# Patient Record
Sex: FEMALE | Race: BLACK OR AFRICAN AMERICAN | NOT HISPANIC OR LATINO | Employment: FULL TIME | URBAN - METROPOLITAN AREA
[De-identification: names, ages, dates, MRNs, and addresses within clinical notes are randomized per-mention and may not be internally consistent; named-entity substitution may affect disease eponyms.]

---

## 2017-01-27 RX ORDER — POTASSIUM CHLORIDE 750 MG/1
TABLET, EXTENDED RELEASE ORAL
Qty: 30 TABLET | Refills: 0 | OUTPATIENT
Start: 2017-01-27

## 2017-03-18 ENCOUNTER — NURSE TRIAGE (OUTPATIENT)
Dept: ADMINISTRATIVE | Facility: CLINIC | Age: 27
End: 2017-03-18

## 2017-03-18 NOTE — TELEPHONE ENCOUNTER
Reason for Disposition   Caller requesting a NON-URGENT new prescription or refill and triager unable to refill per unit policy    Protocols used: ST MEDICATION QUESTION CALL-A-    Pt calling with concerns of medication management.  Pt states wanting to know if she could or how should she take medications due to the amount of medications she has.  Pt also states Potassium pill is noted in stool off and on; not sure if that should be happening.  Pt also needs a refill on Potassium medication (pt does have enough to get through the weekend).

## 2017-07-29 RX ORDER — LISINOPRIL 20 MG/1
TABLET ORAL
Qty: 30 TABLET | Refills: 0
Start: 2017-07-29

## 2017-09-27 RX ORDER — METFORMIN HYDROCHLORIDE 500 MG/1
TABLET ORAL
Qty: 60 TABLET | Refills: 0 | OUTPATIENT
Start: 2017-09-27

## 2017-09-30 ENCOUNTER — HOSPITAL ENCOUNTER (EMERGENCY)
Facility: HOSPITAL | Age: 27
Discharge: HOME | End: 2017-09-30
Attending: EMERGENCY MEDICINE
Payer: MEDICAID

## 2017-09-30 VITALS
TEMPERATURE: 99 F | OXYGEN SATURATION: 99 % | WEIGHT: 293 LBS | BODY MASS INDEX: 46.72 KG/M2 | RESPIRATION RATE: 20 BRPM | SYSTOLIC BLOOD PRESSURE: 135 MMHG | HEART RATE: 110 BPM | DIASTOLIC BLOOD PRESSURE: 97 MMHG

## 2017-09-30 DIAGNOSIS — R51.9 ACUTE NONINTRACTABLE HEADACHE, UNSPECIFIED HEADACHE TYPE: Primary | ICD-10-CM

## 2017-09-30 LAB
ALBUMIN SERPL BCP-MCNC: 3 G/DL
ALP SERPL-CCNC: 82 U/L
ALT SERPL W/O P-5'-P-CCNC: 31 U/L
ANION GAP SERPL CALC-SCNC: 8 MMOL/L
AST SERPL-CCNC: 40 U/L
B-HCG UR QL: NEGATIVE
BACTERIA #/AREA URNS AUTO: NORMAL /HPF
BASOPHILS # BLD AUTO: 0 K/UL
BASOPHILS NFR BLD: 0 %
BILIRUB SERPL-MCNC: 0.7 MG/DL
BILIRUB UR QL STRIP: NEGATIVE
BUN SERPL-MCNC: 12 MG/DL
CALCIUM SERPL-MCNC: 8.8 MG/DL
CHLORIDE SERPL-SCNC: 106 MMOL/L
CLARITY UR REFRACT.AUTO: ABNORMAL
CO2 SERPL-SCNC: 24 MMOL/L
COLOR UR AUTO: YELLOW
CREAT SERPL-MCNC: 0.8 MG/DL
CTP QC/QA: YES
DIFFERENTIAL METHOD: ABNORMAL
EOSINOPHIL # BLD AUTO: 0 K/UL
EOSINOPHIL NFR BLD: 0.8 %
ERYTHROCYTE [DISTWIDTH] IN BLOOD BY AUTOMATED COUNT: 14.5 %
EST. GFR  (AFRICAN AMERICAN): >60 ML/MIN/1.73 M^2
EST. GFR  (NON AFRICAN AMERICAN): >60 ML/MIN/1.73 M^2
FLUAV AG SPEC QL IA: NEGATIVE
FLUBV AG SPEC QL IA: NEGATIVE
GLUCOSE SERPL-MCNC: 160 MG/DL
GLUCOSE UR QL STRIP: NEGATIVE
HCT VFR BLD AUTO: 37.2 %
HGB BLD-MCNC: 11.9 G/DL
HGB UR QL STRIP: NEGATIVE
HYALINE CASTS UR QL AUTO: 0 /LPF
KETONES UR QL STRIP: ABNORMAL
LEUKOCYTE ESTERASE UR QL STRIP: NEGATIVE
LYMPHOCYTES # BLD AUTO: 1.4 K/UL
LYMPHOCYTES NFR BLD: 27.3 %
MCH RBC QN AUTO: 25.3 PG
MCHC RBC AUTO-ENTMCNC: 32 G/DL
MCV RBC AUTO: 79 FL
MICROSCOPIC COMMENT: NORMAL
MONOCYTES # BLD AUTO: 0.4 K/UL
MONOCYTES NFR BLD: 7.1 %
NEUTROPHILS # BLD AUTO: 3.3 K/UL
NEUTROPHILS NFR BLD: 64.6 %
NITRITE UR QL STRIP: NEGATIVE
PH UR STRIP: 5 [PH] (ref 5–8)
PLATELET # BLD AUTO: 264 K/UL
PMV BLD AUTO: 9 FL
POTASSIUM SERPL-SCNC: 4.1 MMOL/L
PROT SERPL-MCNC: 7.3 G/DL
PROT UR QL STRIP: ABNORMAL
RBC # BLD AUTO: 4.71 M/UL
RBC #/AREA URNS AUTO: 1 /HPF (ref 0–4)
SODIUM SERPL-SCNC: 138 MMOL/L
SP GR UR STRIP: >=1.03 (ref 1–1.03)
SPECIMEN SOURCE: NORMAL
SQUAMOUS #/AREA URNS AUTO: 8 /HPF
URN SPEC COLLECT METH UR: ABNORMAL
UROBILINOGEN UR STRIP-ACNC: NEGATIVE EU/DL
WBC # BLD AUTO: 5.1 K/UL
WBC #/AREA URNS AUTO: 2 /HPF (ref 0–5)

## 2017-09-30 PROCEDURE — 99283 EMERGENCY DEPT VISIT LOW MDM: CPT | Mod: 25

## 2017-09-30 PROCEDURE — 85025 COMPLETE CBC W/AUTO DIFF WBC: CPT

## 2017-09-30 PROCEDURE — 81025 URINE PREGNANCY TEST: CPT | Performed by: EMERGENCY MEDICINE

## 2017-09-30 PROCEDURE — 63600175 PHARM REV CODE 636 W HCPCS: Performed by: PHYSICIAN ASSISTANT

## 2017-09-30 PROCEDURE — 81001 URINALYSIS AUTO W/SCOPE: CPT

## 2017-09-30 PROCEDURE — 96374 THER/PROPH/DIAG INJ IV PUSH: CPT

## 2017-09-30 PROCEDURE — 80053 COMPREHEN METABOLIC PANEL: CPT

## 2017-09-30 PROCEDURE — 87400 INFLUENZA A/B EACH AG IA: CPT

## 2017-09-30 PROCEDURE — 25000003 PHARM REV CODE 250: Performed by: PHYSICIAN ASSISTANT

## 2017-09-30 PROCEDURE — 99283 EMERGENCY DEPT VISIT LOW MDM: CPT | Mod: ,,, | Performed by: EMERGENCY MEDICINE

## 2017-09-30 RX ORDER — METOCLOPRAMIDE HYDROCHLORIDE 5 MG/ML
10 INJECTION INTRAMUSCULAR; INTRAVENOUS
Status: COMPLETED | OUTPATIENT
Start: 2017-09-30 | End: 2017-09-30

## 2017-09-30 RX ORDER — METOCLOPRAMIDE 10 MG/1
10 TABLET ORAL EVERY 6 HOURS
Qty: 30 TABLET | Refills: 0 | Status: SHIPPED | OUTPATIENT
Start: 2017-09-30

## 2017-09-30 RX ORDER — DIPHENHYDRAMINE HCL 25 MG
25 CAPSULE ORAL
Status: COMPLETED | OUTPATIENT
Start: 2017-09-30 | End: 2017-09-30

## 2017-09-30 RX ADMIN — METOCLOPRAMIDE 10 MG: 5 INJECTION, SOLUTION INTRAMUSCULAR; INTRAVENOUS at 03:09

## 2017-09-30 RX ADMIN — DIPHENHYDRAMINE HYDROCHLORIDE 25 MG: 25 CAPSULE ORAL at 03:09

## 2017-09-30 NOTE — ED PROVIDER NOTES
Encounter Date: 9/30/2017       History     Chief Complaint   Patient presents with    Fever     26 y.o. morbidly obese F with PMHx of HTN, DM, epilepsy presents to the ED with a cc of fever and HA since yesterday. Pt reports HA that is frontal and behind the eyes. This is typical of her previous HAs. Associated sx include fatigue, nausea and photophobia and slight dizziness with position changes. She reports fever to 102 yesterday. She denies visual changes, extremity weakness, vomiting, abdominal pain, diarrhea,           Review of patient's allergies indicates:   Allergen Reactions    Zofran [ondansetron hcl (pf)] Other (See Comments)     Chest burns     Past Medical History:   Diagnosis Date    Diabetes mellitus     Hypertension     IIH (idiopathic intracranial hypertension)     Seizure     remote Hx of seizure disorder      Past Surgical History:   Procedure Laterality Date    URETHRA SURGERY       History reviewed. No pertinent family history.  Social History   Substance Use Topics    Smoking status: Never Smoker    Smokeless tobacco: Never Used    Alcohol use Yes     Review of Systems   Constitutional: Positive for fatigue and fever.   HENT: Negative for rhinorrhea, sneezing and sore throat.    Eyes: Positive for photophobia.   Respiratory: Positive for shortness of breath (1 episode ). Negative for cough.    Cardiovascular: Negative for chest pain.   Gastrointestinal: Positive for nausea. Negative for abdominal pain and diarrhea.   Genitourinary: Negative for dysuria.   Musculoskeletal: Negative for myalgias.   Skin: Negative for rash.   Neurological: Positive for headaches. Negative for syncope.       Physical Exam     Initial Vitals [09/30/17 1323]   BP Pulse Resp Temp SpO2   (!) 135/97 110 20 99.4 °F (37.4 °C) 99 %      MAP       109.67         Physical Exam    Nursing note and vitals reviewed.  Constitutional: She appears well-developed and well-nourished. She is not diaphoretic.  Non-toxic  appearance. She does not appear ill. No distress.   HENT:   Head: Normocephalic and atraumatic.   Eyes: Conjunctivae and EOM are normal. Pupils are equal, round, and reactive to light.   Pupils are somewhat dilated, but equal and reactive to light   Neck: Neck supple. No neck rigidity.   Cardiovascular: Normal rate and regular rhythm. Exam reveals no gallop and no friction rub.    No murmur heard.  Pulmonary/Chest: Effort normal and breath sounds normal. No accessory muscle usage. No tachypnea. No respiratory distress. She has no decreased breath sounds. She has no wheezes. She has no rhonchi. She has no rales.   Abdominal: Soft. Normal appearance and bowel sounds are normal. She exhibits no distension. There is no tenderness.   Musculoskeletal: Normal range of motion.   Neurological: She is alert and oriented to person, place, and time. She has normal strength. No cranial nerve deficit or sensory deficit. Coordination normal. GCS eye subscore is 4. GCS verbal subscore is 5. GCS motor subscore is 6.   Skin: Skin is warm and dry. No rash noted. No pallor.   Psychiatric: She has a normal mood and affect. Her behavior is normal. Judgment and thought content normal.         ED Course   Procedures  Labs Reviewed   CBC W/ AUTO DIFFERENTIAL - Abnormal; Notable for the following:        Result Value    Hemoglobin 11.9 (*)     MCV 79 (*)     MCH 25.3 (*)     MPV 9.0 (*)     All other components within normal limits   COMPREHENSIVE METABOLIC PANEL - Abnormal; Notable for the following:     Glucose 160 (*)     Albumin 3.0 (*)     All other components within normal limits   URINALYSIS, REFLEX TO URINE CULTURE - Abnormal; Notable for the following:     Appearance, UA Cloudy (*)     Specific Gravity, UA >=1.030 (*)     Protein, UA 1+ (*)     Ketones, UA Trace (*)     All other components within normal limits   INFLUENZA A AND B ANTIGEN   URINALYSIS MICROSCOPIC   POCT URINE PREGNANCY             Medical Decision Making:   History:    Old Medical Records: I decided to obtain old medical records.  Differential Diagnosis:   My differential diagnosis includes but is not limited to: Migraine, increased intercranial pressure, influenza, tension headache, CVA, and intracranial mass  Clinical Tests:   Lab Tests: Ordered and Reviewed       APC / Resident Notes:   27-year-old female with a history of pseudotumor cerebri presents with complaints of headache and fever.  Patient is not tachycardic on my initial evaluation.  Afebrile.  No acute distress.  She appears uncomfortable and is wearing her sunglasses in the exam room.  Nonfocal neuro exam.    Blood work and urine are unremarkable for acute process.  Influenza negative.    She was given Reglan, Benadryl with significant improvement in her symptoms.  She is stable for discharge home.  I will discharge with a prescription for PO Reglan and follow-up with neurology.  Return precautions given. I have reviewed the patient's records and discussed this case with my supervising physician.                ED Course      Clinical Impression:   The encounter diagnosis was Acute nonintractable headache, unspecified headache type.    Disposition:   Disposition: Discharged  Condition: Stable                        Sharon Rodarte PA-C  09/30/17 2023

## 2017-09-30 NOTE — ED NOTES
Pt dropped the first benadryl on the floor and had to get another. 1st dose discarded in front of PA

## 2019-03-18 ENCOUNTER — TELEPHONE (OUTPATIENT)
Dept: NEUROLOGY | Facility: CLINIC | Age: 29
End: 2019-03-18

## 2019-03-18 NOTE — TELEPHONE ENCOUNTER
----- Message from Gokul Washington sent at 3/18/2019  4:21 PM CDT -----  Contact: Patient @ [phone]  Patient requesting a return call from Dr Flores only, patient didn't get into details, pls return call

## 2019-03-18 NOTE — TELEPHONE ENCOUNTER
Call returned/Spoke with Mrs. Gonsalves-    She states that she saw Dr. Flores once in October of 2016, she went for a physical today and was denied because her med list states that she's taking Topamax and Acetazolamide. Per pt is not currently taking those meds and she needs a letter stating she's fine without them. She informed me that she works for [employer] and was going to e-mail Dr. Flores, but decided to call so that someone can explain it to him. Ms. Gonsalves asked that I forward her message along and ask Dr. Flores to contact her with any questions or concerns. Otherwise she's waiting on a letter.....

## 2019-04-01 ENCOUNTER — TELEPHONE (OUTPATIENT)
Dept: NEUROLOGY | Facility: CLINIC | Age: 29
End: 2019-04-01

## 2019-04-01 NOTE — TELEPHONE ENCOUNTER
----- Message from Justice Friedman sent at 4/1/2019 12:03 PM CDT -----  Needs Advice    Reason for call: Pt is asking to speak w/ someone on staff to confirm the doctor completed clearance letter for pt to return to work (Pt said it's going to [facility], fax# [phone]).        Communication Preference: [phone]    Additional Information:

## 2020-03-17 ENCOUNTER — HOSPITAL ENCOUNTER (INPATIENT)
Facility: HOSPITAL | Age: 30
LOS: 13 days | Discharge: HOME | DRG: 207 | End: 2020-03-30
Attending: EMERGENCY MEDICINE | Admitting: EMERGENCY MEDICINE
Payer: COMMERCIAL

## 2020-03-17 DIAGNOSIS — G93.2 PSEUDOTUMOR CEREBRI: ICD-10-CM

## 2020-03-17 DIAGNOSIS — J96.00 ACUTE RESPIRATORY FAILURE: ICD-10-CM

## 2020-03-17 DIAGNOSIS — J96.01 ACUTE RESPIRATORY FAILURE WITH HYPOXIA: ICD-10-CM

## 2020-03-17 DIAGNOSIS — Z79.899 MEDICATION THERAPY CHANGED: ICD-10-CM

## 2020-03-17 DIAGNOSIS — E11.9 TYPE 2 DIABETES MELLITUS WITHOUT COMPLICATION, WITHOUT LONG-TERM CURRENT USE OF INSULIN: Primary | ICD-10-CM

## 2020-03-17 LAB
ALBUMIN SERPL BCP-MCNC: 1.9 G/DL (ref 3.5–5.2)
ALLENS TEST: ABNORMAL
ALLENS TEST: ABNORMAL
ALP SERPL-CCNC: 70 U/L (ref 55–135)
ALT SERPL W/O P-5'-P-CCNC: 17 U/L (ref 10–44)
ANION GAP SERPL CALC-SCNC: 21 MMOL/L (ref 8–16)
AST SERPL-CCNC: 25 U/L (ref 10–40)
BASOPHILS # BLD AUTO: ABNORMAL K/UL (ref 0–0.2)
BASOPHILS NFR BLD: 0 % (ref 0–1.9)
BILIRUB SERPL-MCNC: 0.3 MG/DL (ref 0.1–1)
BUN SERPL-MCNC: 10 MG/DL (ref 6–20)
CALCIUM SERPL-MCNC: 8.5 MG/DL (ref 8.7–10.5)
CHLORIDE SERPL-SCNC: 108 MMOL/L (ref 95–110)
CK MB SERPL-MCNC: 0.6 NG/ML (ref 0.1–6.5)
CK MB SERPL-RTO: 0.2 % (ref 0–5)
CK SERPL-CCNC: 260 U/L (ref 20–180)
CO2 SERPL-SCNC: 16 MMOL/L (ref 23–29)
CREAT SERPL-MCNC: 0.8 MG/DL (ref 0.5–1.4)
CRP SERPL-MCNC: 353.4 MG/L (ref 0–8.2)
D DIMER PPP IA.FEU-MCNC: >33 MG/L FEU
DELSYS: ABNORMAL
DIFFERENTIAL METHOD: ABNORMAL
EOSINOPHIL # BLD AUTO: ABNORMAL K/UL (ref 0–0.5)
EOSINOPHIL NFR BLD: 0 % (ref 0–8)
ERYTHROCYTE [DISTWIDTH] IN BLOOD BY AUTOMATED COUNT: 14.1 % (ref 11.5–14.5)
ERYTHROCYTE [SEDIMENTATION RATE] IN BLOOD BY WESTERGREN METHOD: 22 MM/H
EST. GFR  (AFRICAN AMERICAN): >60 ML/MIN/1.73 M^2
EST. GFR  (NON AFRICAN AMERICAN): >60 ML/MIN/1.73 M^2
ESTIMATED AVG GLUCOSE: 309 MG/DL (ref 68–131)
ESTIMATED AVG GLUCOSE: 309 MG/DL (ref 68–131)
FERRITIN SERPL-MCNC: 368 NG/ML (ref 20–300)
FIO2: 60
GLUCOSE SERPL-MCNC: 376 MG/DL (ref 70–110)
HBA1C MFR BLD HPLC: 12.4 % (ref 4–5.6)
HBA1C MFR BLD HPLC: 12.4 % (ref 4–5.6)
HCO3 UR-SCNC: 21.9 MMOL/L (ref 24–28)
HCO3 UR-SCNC: 22.2 MMOL/L (ref 24–28)
HCT VFR BLD AUTO: 40 % (ref 37–48.5)
HGB BLD-MCNC: 11.7 G/DL (ref 12–16)
IMM GRANULOCYTES # BLD AUTO: ABNORMAL K/UL (ref 0–0.04)
IMM GRANULOCYTES NFR BLD AUTO: ABNORMAL % (ref 0–0.5)
INFLUENZA A, MOLECULAR: NEGATIVE
INFLUENZA B, MOLECULAR: NEGATIVE
INR PPP: 1.1 (ref 0.8–1.2)
LACTATE SERPL-SCNC: 1 MMOL/L (ref 0.5–2.2)
LDH SERPL L TO P-CCNC: 630 U/L (ref 110–260)
LYMPHOCYTES # BLD AUTO: ABNORMAL K/UL (ref 1–4.8)
LYMPHOCYTES NFR BLD: 8 % (ref 18–48)
MAGNESIUM SERPL-MCNC: 2.2 MG/DL (ref 1.6–2.6)
MCH RBC QN AUTO: 25.7 PG (ref 27–31)
MCHC RBC AUTO-ENTMCNC: 29.3 G/DL (ref 32–36)
MCV RBC AUTO: 88 FL (ref 82–98)
MODE: ABNORMAL
MONOCYTES # BLD AUTO: ABNORMAL K/UL (ref 0.3–1)
MONOCYTES NFR BLD: 1 % (ref 4–15)
NEUTROPHILS NFR BLD: 91 % (ref 38–73)
NRBC BLD-RTO: 0 /100 WBC
PCO2 BLDA: 44.6 MMHG (ref 35–45)
PCO2 BLDA: 45.6 MMHG (ref 35–45)
PEEP: 14
PH SMN: 7.29 [PH] (ref 7.35–7.45)
PH SMN: 7.3 [PH] (ref 7.35–7.45)
PHOSPHATE SERPL-MCNC: 2.9 MG/DL (ref 2.7–4.5)
PLATELET # BLD AUTO: 236 K/UL (ref 150–350)
PLATELET BLD QL SMEAR: ABNORMAL
PMV BLD AUTO: 10.4 FL (ref 9.2–12.9)
PO2 BLDA: 67 MMHG (ref 80–100)
PO2 BLDA: 84 MMHG (ref 80–100)
POC BE: -4 MMOL/L
POC BE: -5 MMOL/L
POC SATURATED O2: 91 % (ref 95–100)
POC SATURATED O2: 95 % (ref 95–100)
POC TCO2: 23 MMOL/L (ref 23–27)
POC TCO2: 24 MMOL/L (ref 23–27)
POCT GLUCOSE: 418 MG/DL (ref 70–110)
POCT GLUCOSE: 423 MG/DL (ref 70–110)
POTASSIUM SERPL-SCNC: 4.2 MMOL/L (ref 3.5–5.1)
PROT SERPL-MCNC: 7 G/DL (ref 6–8.4)
PROTHROMBIN TIME: 11.6 SEC (ref 9–12.5)
PROVIDER CREDENTIALS: ABNORMAL
PROVIDER NOTIFIED: ABNORMAL
RBC # BLD AUTO: 4.56 M/UL (ref 4–5.4)
SAMPLE: ABNORMAL
SAMPLE: ABNORMAL
SITE: ABNORMAL
SITE: ABNORMAL
SODIUM SERPL-SCNC: 145 MMOL/L (ref 136–145)
SP02: 96
SPECIMEN SOURCE: NORMAL
TIME NOTIFIED: 1559
TROPONIN I SERPL DL<=0.01 NG/ML-MCNC: 0.04 NG/ML (ref 0–0.03)
VERBAL RESULT READBACK PERFORMED: YES
VT: 380
WBC # BLD AUTO: 5.96 K/UL (ref 3.9–12.7)

## 2020-03-17 PROCEDURE — 37799 UNLISTED PX VASCULAR SURGERY: CPT

## 2020-03-17 PROCEDURE — 25000003 PHARM REV CODE 250

## 2020-03-17 PROCEDURE — 94003 VENT MGMT INPAT SUBQ DAY: CPT

## 2020-03-17 PROCEDURE — 84100 ASSAY OF PHOSPHORUS: CPT

## 2020-03-17 PROCEDURE — 94761 N-INVAS EAR/PLS OXIMETRY MLT: CPT

## 2020-03-17 PROCEDURE — 99233 SBSQ HOSP IP/OBS HIGH 50: CPT | Mod: ,,, | Performed by: INTERNAL MEDICINE

## 2020-03-17 PROCEDURE — 86140 C-REACTIVE PROTEIN: CPT

## 2020-03-17 PROCEDURE — 85379 FIBRIN DEGRADATION QUANT: CPT

## 2020-03-17 PROCEDURE — 83735 ASSAY OF MAGNESIUM: CPT

## 2020-03-17 PROCEDURE — 94002 VENT MGMT INPAT INIT DAY: CPT

## 2020-03-17 PROCEDURE — 82550 ASSAY OF CK (CPK): CPT

## 2020-03-17 PROCEDURE — 84484 ASSAY OF TROPONIN QUANT: CPT

## 2020-03-17 PROCEDURE — 80053 COMPREHEN METABOLIC PANEL: CPT

## 2020-03-17 PROCEDURE — 99900035 HC TECH TIME PER 15 MIN (STAT)

## 2020-03-17 PROCEDURE — 27000221 HC OXYGEN, UP TO 24 HOURS

## 2020-03-17 PROCEDURE — 20000000 HC ICU ROOM

## 2020-03-17 PROCEDURE — 83036 HEMOGLOBIN GLYCOSYLATED A1C: CPT | Mod: 91

## 2020-03-17 PROCEDURE — 25000003 PHARM REV CODE 250: Performed by: STUDENT IN AN ORGANIZED HEALTH CARE EDUCATION/TRAINING PROGRAM

## 2020-03-17 PROCEDURE — 85027 COMPLETE CBC AUTOMATED: CPT

## 2020-03-17 PROCEDURE — 82803 BLOOD GASES ANY COMBINATION: CPT

## 2020-03-17 PROCEDURE — 36415 COLL VENOUS BLD VENIPUNCTURE: CPT

## 2020-03-17 PROCEDURE — 83615 LACTATE (LD) (LDH) ENZYME: CPT

## 2020-03-17 PROCEDURE — 85007 BL SMEAR W/DIFF WBC COUNT: CPT

## 2020-03-17 PROCEDURE — 83605 ASSAY OF LACTIC ACID: CPT

## 2020-03-17 PROCEDURE — 85610 PROTHROMBIN TIME: CPT

## 2020-03-17 PROCEDURE — 87502 INFLUENZA DNA AMP PROBE: CPT

## 2020-03-17 PROCEDURE — 82728 ASSAY OF FERRITIN: CPT

## 2020-03-17 PROCEDURE — 82553 CREATINE MB FRACTION: CPT

## 2020-03-17 PROCEDURE — 63600175 PHARM REV CODE 636 W HCPCS: Performed by: STUDENT IN AN ORGANIZED HEALTH CARE EDUCATION/TRAINING PROGRAM

## 2020-03-17 PROCEDURE — 99233 PR SUBSEQUENT HOSPITAL CARE,LEVL III: ICD-10-PCS | Mod: ,,, | Performed by: INTERNAL MEDICINE

## 2020-03-17 PROCEDURE — 63600175 PHARM REV CODE 636 W HCPCS

## 2020-03-17 RX ORDER — ROCURONIUM BROMIDE 10 MG/ML
INJECTION, SOLUTION INTRAVENOUS
Status: DISPENSED
Start: 2020-03-17 | End: 2020-03-18

## 2020-03-17 RX ORDER — ENOXAPARIN SODIUM 100 MG/ML
40 INJECTION SUBCUTANEOUS EVERY 24 HOURS
Status: DISCONTINUED | OUTPATIENT
Start: 2020-03-17 | End: 2020-03-18

## 2020-03-17 RX ORDER — DEXMEDETOMIDINE HYDROCHLORIDE 4 UG/ML
0.2 INJECTION, SOLUTION INTRAVENOUS CONTINUOUS
Status: DISCONTINUED | OUTPATIENT
Start: 2020-03-18 | End: 2020-03-20

## 2020-03-17 RX ORDER — DEXMEDETOMIDINE HYDROCHLORIDE 4 UG/ML
INJECTION, SOLUTION INTRAVENOUS
Status: COMPLETED
Start: 2020-03-17 | End: 2020-03-17

## 2020-03-17 RX ORDER — PROPOFOL 10 MG/ML
5 INJECTION, EMULSION INTRAVENOUS CONTINUOUS
Status: DISCONTINUED | OUTPATIENT
Start: 2020-03-17 | End: 2020-03-20

## 2020-03-17 RX ORDER — PANTOPRAZOLE SODIUM 40 MG/10ML
40 INJECTION, POWDER, LYOPHILIZED, FOR SOLUTION INTRAVENOUS DAILY
Status: DISCONTINUED | OUTPATIENT
Start: 2020-03-18 | End: 2020-03-20

## 2020-03-17 RX ORDER — SODIUM CHLORIDE 0.9 % (FLUSH) 0.9 %
10 SYRINGE (ML) INJECTION
Status: DISCONTINUED | OUTPATIENT
Start: 2020-03-17 | End: 2020-03-30 | Stop reason: HOSPADM

## 2020-03-17 RX ORDER — FENTANYL CITRATE-0.9 % NACL/PF 10 MCG/ML
25 PLASTIC BAG, INJECTION (ML) INTRAVENOUS CONTINUOUS
Status: DISCONTINUED | OUTPATIENT
Start: 2020-03-17 | End: 2020-03-18

## 2020-03-17 RX ORDER — FUROSEMIDE 10 MG/ML
40 INJECTION INTRAMUSCULAR; INTRAVENOUS ONCE
Status: COMPLETED | OUTPATIENT
Start: 2020-03-17 | End: 2020-03-17

## 2020-03-17 RX ORDER — GLUCAGON 1 MG
1 KIT INJECTION
Status: DISCONTINUED | OUTPATIENT
Start: 2020-03-17 | End: 2020-03-18

## 2020-03-17 RX ORDER — CHLORHEXIDINE GLUCONATE ORAL RINSE 1.2 MG/ML
15 SOLUTION DENTAL 2 TIMES DAILY
Status: DISCONTINUED | OUTPATIENT
Start: 2020-03-17 | End: 2020-03-26

## 2020-03-17 RX ORDER — INSULIN ASPART 100 [IU]/ML
0-5 INJECTION, SOLUTION INTRAVENOUS; SUBCUTANEOUS EVERY 6 HOURS PRN
Status: DISCONTINUED | OUTPATIENT
Start: 2020-03-17 | End: 2020-03-18

## 2020-03-17 RX ADMIN — PROPOFOL 50 MCG/KG/MIN: 10 INJECTION, EMULSION INTRAVENOUS at 06:03

## 2020-03-17 RX ADMIN — ENOXAPARIN SODIUM 40 MG: 100 INJECTION SUBCUTANEOUS at 06:03

## 2020-03-17 RX ADMIN — PROPOFOL 50 MCG/KG/MIN: 10 INJECTION, EMULSION INTRAVENOUS at 11:03

## 2020-03-17 RX ADMIN — PROPOFOL 50 MCG/KG/MIN: 10 INJECTION, EMULSION INTRAVENOUS at 09:03

## 2020-03-17 RX ADMIN — INSULIN ASPART 5 UNITS: 100 INJECTION, SOLUTION INTRAVENOUS; SUBCUTANEOUS at 06:03

## 2020-03-17 RX ADMIN — CHLORHEXIDINE GLUCONATE 0.12% ORAL RINSE 15 ML: 1.2 LIQUID ORAL at 09:03

## 2020-03-17 RX ADMIN — INSULIN ASPART 3 UNITS: 100 INJECTION, SOLUTION INTRAVENOUS; SUBCUTANEOUS at 10:03

## 2020-03-17 RX ADMIN — DEXMEDETOMIDINE HYDROCHLORIDE 0.2 MCG/KG/HR: 100 INJECTION, SOLUTION, CONCENTRATE INTRAVENOUS at 11:03

## 2020-03-17 RX ADMIN — FUROSEMIDE 40 MG: 10 INJECTION, SOLUTION INTRAVENOUS at 08:03

## 2020-03-17 RX ADMIN — Medication 150 MCG/HR: at 06:03

## 2020-03-17 NOTE — H&P
[facility]  Critical Care Medicine  History & Physical    Patient Name: Riri Gonsalves  MRN: [id number]  Admission Date: 3/17/2020  Hospital Length of Stay: 0 days  Code Status: Full Code  Attending Physician: Michele Britt MD   Primary Care Provider: Jocelynn Brooks MD   Principal Problem: Acute respiratory failure    Subjective:     HPI:  Pt is 29yoF w/ PMH DM2, HTN, and benign intracranial HTN who presented to [facility] on 3/13 complaining of shortness of breath, fevers, and cough. 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 According to notes from OSH pt had rhinorrhea & dry cough for several days- went to urgent care where she was told she had flu-like symptoms & was discharged home on amoxicillin. A few days later she was continuing to have fevers as high as 103 & was having worsening SOB at rest- went to [facility] ED & was initially feeling better on 2L NC. Labs on presentation were CRP 18.2, WBC 3.7, flu negative. Pt was admitted to medicine & started on empiric CAP coverage. Pt's condition continued to deteriorate- worsening leukopenia, lymphopenia, elevated LDH, & CRP, & increasing oxygen requirements. CXR showed worsening multifocal airspace disease. Pt was transferred to MICU on 3/14. Pt decompensated &was intubated on 3/15 & on 3/16 results came back positive for Covid 19. Echo showed normal LV function (EF 60%)Pt required fentanyl, precedex, and propofol. Pt also started on methylprednisolone. Pt was proned on 3/16. Pt was supinated prior to transfer.   Pt presents as transfer from [facility] ICU for possible ecmo.     Hospital/ICU Course:  No notes on file     Past Medical History:   Diagnosis Date    Diabetes mellitus     Hypertension     IIH (idiopathic intracranial hypertension)     Seizure     remote Hx of seizure disorder        Past Surgical History:   Procedure Laterality Date    URETHRA SURGERY         Review of patient's allergies indicates:   Allergen Reactions     Zofran [ondansetron hcl (pf)] Other (See Comments)     Chest burns       Family History     None        Tobacco Use    Smoking status: Never Smoker    Smokeless tobacco: Never Used   Substance and Sexual Activity    Alcohol use: Yes    Drug use: No    Sexual activity: Yes      Review of Systems   Unable to perform ROS: Intubated     Objective:     Vital Signs (Most Recent):  Temp: 98.2 °F (36.8 °C) (03/17/20 1600)  Pulse: 74 (03/17/20 1600)  Resp: (!) 28 (03/17/20 1600)  BP: (!) 162/98 (03/17/20 1600)  SpO2: 96 % (03/17/20 1600) Vital Signs (24h Range):  Temp:  [98 °F (36.7 °C)-100.3 °F (37.9 °C)] 98.2 °F (36.8 °C)  Pulse:  [72-98] 74  Resp:  [20-34] 28  SpO2:  [96 %-100 %] 96 %  BP: (150-163)/(90-98) 162/98  Arterial Line BP: (170-171)/(86-88) 171/86   Weight: (!) 136.2 kg (300 lb 4.3 oz)  Body mass index is 41.88 kg/m².    No intake or output data in the 24 hours ending 03/17/20 1713    Physical Exam   Constitutional:   Refer to attestation       Vents:  Vent Mode: A/C (03/17/20 1700)  Ventilator Initiated: Yes (03/17/20 1619)  Set Rate: 22 BPM (03/17/20 1700)  Vt Set: 380 mL (03/17/20 1700)  Pressure Support: 3 cmH20 (03/17/20 1700)  PEEP/CPAP: 10 cmH20 (03/17/20 1700)  Oxygen Concentration (%): 60 (03/17/20 1642)  Peak Airway Pressure: 13 cmH2O (03/17/20 1700)  Total Ve: 30 mL (03/17/20 1700)  Lines/Drains/Airways     Drain                 Urethral Catheter 03/17/20 1200 less than 1 day          Airway                 Airway - Non-Surgical -- days              Significant Labs:    CBC/Anemia Profile:  Recent Labs   Lab 03/17/20  1558   WBC 5.96   HGB 11.7*   HCT 40.0      MCV 88   RDW 14.1   FERRITIN 368*        Chemistries:  No results for input(s): NA, K, CL, CO2, BUN, CREATININE, CALCIUM, ALBUMIN, PROT, BILITOT, ALKPHOS, ALT, AST, GLUCOSE, MG, PHOS in the last 48 hours.      Assessment/Plan:     Pulmonary  Acute respiratory failure  Pt transferred from [facility] on 3/17 for ARDS 2/2 COVID 19. Pt  was intubated on 3/15 & is on precedex, propofol, and fentanyl.   On transfer: , ferretin 368, D-Dimer >33    Plan:  - proned pt @ 5:30pm on 3/17  - vent settings 60% FiO2 & 10 PEEP   - continue methylprednisolone 60mg q6  - continue fentanyl, propofol, and precedex- wean as tolerated  - azithromycin & rocephin for emperic CAP coverage  - f/u daily CRP  - holding on plaquenil b/c waiting for approval of remdesevir    Cardiac/Vascular  Essential hypertension  Home med includes lisinopril 20mg daily & lasix 40mg daily.     Plan:  - hold antihypertensives    Endocrine  Type 2 diabetes mellitus without complication, without long-term current use of insulin  Pt has A1c 12.4 (3/17/20) & home med is metformin 500mg BID.     Plan:  - LDSSI         Critical Care Daily Checklist:    A: Awake: RASS Goal/Actual Goal: RASS Goal: -3-->moderate sedation  Actual:     B: Spontaneous Breathing Trial Performed?     C: SAT & SBT Coordinated?  N/A                      D: Delirium: CAM-ICU     E: Early Mobility Performed? No   F: Feeding Goal:    Status:     Current Diet Order   Procedures    Diet NPO      AS: Analgesia/Sedation Propofol, precedex, fentanyl   T: Thromboembolic Prophylaxis lovenox   H: HOB > 300 No   U: Stress Ulcer Prophylaxis (if needed) protonix 40mg IV daily   G: Glucose Control LDSSI   B: Bowel Function     I: Indwelling Catheter (Lines & Richard) Necessity ET tube, richard, peripheral IV   D: De-escalation of Antimicrobials/Pharmacotherapies     Plan for the day/ETD Pronated at 5:30pm    Code Status:  Family/Goals of Care: Full Code         Critical secondary to Patient has a condition that poses threat to life and bodily function: ARDS 2/2 COVID 19     Critical care was time spent personally by me on the following activities: development of treatment plan with patient or surrogate and bedside caregivers, discussions with consultants, evaluation of patient's response to treatment, examination of patient,  ordering and performing treatments and interventions, ordering and review of laboratory studies, ordering and review of radiographic studies, pulse oximetry, re-evaluation of patient's condition. This critical care time did not overlap with that of any other provider or involve time for any procedures.     Veda Gan MD  Critical Care Medicine  [facility]

## 2020-03-17 NOTE — SUBJECTIVE & OBJECTIVE
Past Medical History:   Diagnosis Date    Diabetes mellitus     Hypertension     IIH (idiopathic intracranial hypertension)     Seizure     remote Hx of seizure disorder        Past Surgical History:   Procedure Laterality Date    URETHRA SURGERY         Review of patient's allergies indicates:   Allergen Reactions    Zofran [ondansetron hcl (pf)] Other (See Comments)     Chest burns       Family History     None        Tobacco Use    Smoking status: Never Smoker    Smokeless tobacco: Never Used   Substance and Sexual Activity    Alcohol use: Yes    Drug use: No    Sexual activity: Yes      Review of Systems   Unable to perform ROS: Intubated     Objective:     Vital Signs (Most Recent):  Temp: 98.2 °F (36.8 °C) (03/17/20 1600)  Pulse: 74 (03/17/20 1600)  Resp: (!) 28 (03/17/20 1600)  BP: (!) 162/98 (03/17/20 1600)  SpO2: 96 % (03/17/20 1600) Vital Signs (24h Range):  Temp:  [98 °F (36.7 °C)-100.3 °F (37.9 °C)] 98.2 °F (36.8 °C)  Pulse:  [72-98] 74  Resp:  [20-34] 28  SpO2:  [96 %-100 %] 96 %  BP: (150-163)/(90-98) 162/98  Arterial Line BP: (170-171)/(86-88) 171/86   Weight: (!) 136.2 kg (300 lb 4.3 oz)  Body mass index is 41.88 kg/m².    No intake or output data in the 24 hours ending 03/17/20 1713    Physical Exam   Constitutional:   Refer to attestation       Vents:  Vent Mode: A/C (03/17/20 1700)  Ventilator Initiated: Yes (03/17/20 1619)  Set Rate: 22 BPM (03/17/20 1700)  Vt Set: 380 mL (03/17/20 1700)  Pressure Support: 3 cmH20 (03/17/20 1700)  PEEP/CPAP: 10 cmH20 (03/17/20 1700)  Oxygen Concentration (%): 60 (03/17/20 1642)  Peak Airway Pressure: 13 cmH2O (03/17/20 1700)  Total Ve: 30 mL (03/17/20 1700)  Lines/Drains/Airways     Drain                 Urethral Catheter 03/17/20 1200 less than 1 day          Airway                 Airway - Non-Surgical -- days              Significant Labs:    CBC/Anemia Profile:  Recent Labs   Lab 03/17/20  1558   WBC 5.96   HGB 11.7*   HCT 40.0      MCV 88    RDW 14.1   FERRITIN 368*        Chemistries:  No results for input(s): NA, K, CL, CO2, BUN, CREATININE, CALCIUM, ALBUMIN, PROT, BILITOT, ALKPHOS, ALT, AST, GLUCOSE, MG, PHOS in the last 48 hours.

## 2020-03-17 NOTE — HPI
Pt is 29yoF w/ PMH DM2, HTN, and benign intracranial HTN who presented to [facility] on 3/13 complaining of shortness of breath, fevers, and cough. 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 A few days later she was continuing to have fevers as high as 103 & was having worsening SOB at rest- went to [facility] ED & was initially feeling better on 2L NC. Labs on presentation were CRP 18.2, WBC 3.7, flu negative. Pt was admitted to medicine & started on empiric CAP coverage. Pt's condition continued to deteriorate- worsening leukopenia, lymphopenia, elevated LDH, & CRP, & increasing oxygen requirements. CXR showed worsening multifocal airspace disease. Pt was transferred to MICU on 3/14. Pt decompensated & was intubated on 3/15 & on 3/16 results came back positive for Covid 19. Echo showed normal LV function (EF 60%). Pt was proned on 3/16. Pt was supinated prior to transfer. Pt presents as transfer from [facility] ICU for possible ecmo.

## 2020-03-17 NOTE — ASSESSMENT & PLAN NOTE
Pt transferred from [facility] on 3/17 for ARDS 2/2 COVID 19. Pt was intubated on 3/15 & is on precedex, propofol, and fentanyl.   On transfer: , ferretin 368, D-Dimer >33    Plan:  - proned pt @ 5:30pm on 3/17  - vent settings 60% FiO2 & 10 PEEP   - continue methylprednisolone 60mg q6  - continue fentanyl, propofol, and precedex- wean as tolerated  - azithromycin & rocephin for emperic CAP coverage  - f/u daily CRP  - holding on plaquenil b/c waiting for approval of remdesevir

## 2020-03-17 NOTE — PLAN OF CARE
Dr. Amanda stated that chart from [facility] incomplete an need assistance retrieving records.  Spoke with admissions, no incomplete processes noted on their end.  Spoke with Tabatha in referral center.  Informed by resident that notes from 3/15/2020-3/16/2020 missing along with results of COVID-19 testing.  Stated that they would be calling [facility].  Callback number to residents given for updates ([phone]).  Faxed [facility] medical records for missing dates and COVID-19 results.  Fax should return to copier in the CM area in [facility].

## 2020-03-18 LAB
ALBUMIN SERPL BCP-MCNC: 2 G/DL (ref 3.5–5.2)
ALLENS TEST: ABNORMAL
ALP SERPL-CCNC: 72 U/L (ref 55–135)
ALT SERPL W/O P-5'-P-CCNC: 16 U/L (ref 10–44)
ANION GAP SERPL CALC-SCNC: 15 MMOL/L (ref 8–16)
AST SERPL-CCNC: 22 U/L (ref 10–40)
BASOPHILS # BLD AUTO: 0.01 K/UL (ref 0–0.2)
BASOPHILS NFR BLD: 0.1 % (ref 0–1.9)
BILIRUB SERPL-MCNC: 0.3 MG/DL (ref 0.1–1)
BUN SERPL-MCNC: 16 MG/DL (ref 6–20)
CALCIUM SERPL-MCNC: 9.2 MG/DL (ref 8.7–10.5)
CHLORIDE SERPL-SCNC: 106 MMOL/L (ref 95–110)
CO2 SERPL-SCNC: 25 MMOL/L (ref 23–29)
CREAT SERPL-MCNC: 1.1 MG/DL (ref 0.5–1.4)
CRP SERPL-MCNC: 282 MG/L (ref 0–8.2)
DELSYS: ABNORMAL
DIFFERENTIAL METHOD: ABNORMAL
EOSINOPHIL # BLD AUTO: 0 K/UL (ref 0–0.5)
EOSINOPHIL NFR BLD: 0 % (ref 0–8)
ERYTHROCYTE [DISTWIDTH] IN BLOOD BY AUTOMATED COUNT: 14.3 % (ref 11.5–14.5)
ERYTHROCYTE [SEDIMENTATION RATE] IN BLOOD BY WESTERGREN METHOD: 22 MM/H
EST. GFR  (AFRICAN AMERICAN): >60 ML/MIN/1.73 M^2
EST. GFR  (NON AFRICAN AMERICAN): >60 ML/MIN/1.73 M^2
ETCO2: 44
FIO2: 50
GLUCOSE SERPL-MCNC: 440 MG/DL (ref 70–110)
HCO3 UR-SCNC: 30.2 MMOL/L (ref 24–28)
HCT VFR BLD AUTO: 37.6 % (ref 37–48.5)
HGB BLD-MCNC: 10.9 G/DL (ref 12–16)
IMM GRANULOCYTES # BLD AUTO: 0.09 K/UL (ref 0–0.04)
IMM GRANULOCYTES NFR BLD AUTO: 1.3 % (ref 0–0.5)
LYMPHOCYTES # BLD AUTO: 0.6 K/UL (ref 1–4.8)
LYMPHOCYTES NFR BLD: 9.5 % (ref 18–48)
MAGNESIUM SERPL-MCNC: 2.1 MG/DL (ref 1.6–2.6)
MCH RBC QN AUTO: 25.3 PG (ref 27–31)
MCHC RBC AUTO-ENTMCNC: 29 G/DL (ref 32–36)
MCV RBC AUTO: 87 FL (ref 82–98)
MODE: ABNORMAL
MONOCYTES # BLD AUTO: 0.4 K/UL (ref 0.3–1)
MONOCYTES NFR BLD: 5.5 % (ref 4–15)
NEUTROPHILS # BLD AUTO: 5.6 K/UL (ref 1.8–7.7)
NEUTROPHILS NFR BLD: 83.6 % (ref 38–73)
NRBC BLD-RTO: 0 /100 WBC
PCO2 BLDA: 48.3 MMHG (ref 35–45)
PEEP: 10
PH SMN: 7.4 [PH] (ref 7.35–7.45)
PHOSPHATE SERPL-MCNC: 3.3 MG/DL (ref 2.7–4.5)
PLATELET # BLD AUTO: 274 K/UL (ref 150–350)
PMV BLD AUTO: 10.5 FL (ref 9.2–12.9)
PO2 BLDA: 61 MMHG (ref 80–100)
POC BE: 5 MMOL/L
POC SATURATED O2: 91 % (ref 95–100)
POC TCO2: 32 MMOL/L (ref 23–27)
POCT GLUCOSE: 369 MG/DL (ref 70–110)
POCT GLUCOSE: 393 MG/DL (ref 70–110)
POCT GLUCOSE: 403 MG/DL (ref 70–110)
POCT GLUCOSE: 430 MG/DL (ref 70–110)
POCT GLUCOSE: 480 MG/DL (ref 70–110)
POCT GLUCOSE: 487 MG/DL (ref 70–110)
POTASSIUM SERPL-SCNC: 4.8 MMOL/L (ref 3.5–5.1)
PROT SERPL-MCNC: 7.2 G/DL (ref 6–8.4)
RBC # BLD AUTO: 4.3 M/UL (ref 4–5.4)
SAMPLE: ABNORMAL
SITE: ABNORMAL
SODIUM SERPL-SCNC: 146 MMOL/L (ref 136–145)
SP02: 88
VT: 380
WBC # BLD AUTO: 6.72 K/UL (ref 3.9–12.7)

## 2020-03-18 PROCEDURE — 99233 PR SUBSEQUENT HOSPITAL CARE,LEVL III: ICD-10-PCS | Mod: 25,,, | Performed by: INTERNAL MEDICINE

## 2020-03-18 PROCEDURE — 25000003 PHARM REV CODE 250: Performed by: STUDENT IN AN ORGANIZED HEALTH CARE EDUCATION/TRAINING PROGRAM

## 2020-03-18 PROCEDURE — C9113 INJ PANTOPRAZOLE SODIUM, VIA: HCPCS | Performed by: STUDENT IN AN ORGANIZED HEALTH CARE EDUCATION/TRAINING PROGRAM

## 2020-03-18 PROCEDURE — 63600175 PHARM REV CODE 636 W HCPCS: Performed by: INTERNAL MEDICINE

## 2020-03-18 PROCEDURE — 20000000 HC ICU ROOM

## 2020-03-18 PROCEDURE — 99900026 HC AIRWAY MAINTENANCE (STAT)

## 2020-03-18 PROCEDURE — 80053 COMPREHEN METABOLIC PANEL: CPT

## 2020-03-18 PROCEDURE — 36620 ARTERIAL LINE: ICD-10-PCS | Mod: ,,, | Performed by: INTERNAL MEDICINE

## 2020-03-18 PROCEDURE — 36620 INSERTION CATHETER ARTERY: CPT | Mod: ,,, | Performed by: INTERNAL MEDICINE

## 2020-03-18 PROCEDURE — 86140 C-REACTIVE PROTEIN: CPT

## 2020-03-18 PROCEDURE — 27000221 HC OXYGEN, UP TO 24 HOURS

## 2020-03-18 PROCEDURE — 36415 COLL VENOUS BLD VENIPUNCTURE: CPT

## 2020-03-18 PROCEDURE — 84100 ASSAY OF PHOSPHORUS: CPT

## 2020-03-18 PROCEDURE — 94003 VENT MGMT INPAT SUBQ DAY: CPT

## 2020-03-18 PROCEDURE — 85025 COMPLETE CBC W/AUTO DIFF WBC: CPT

## 2020-03-18 PROCEDURE — 99233 SBSQ HOSP IP/OBS HIGH 50: CPT | Mod: 25,,, | Performed by: INTERNAL MEDICINE

## 2020-03-18 PROCEDURE — 25000003 PHARM REV CODE 250: Performed by: INTERNAL MEDICINE

## 2020-03-18 PROCEDURE — 83735 ASSAY OF MAGNESIUM: CPT

## 2020-03-18 PROCEDURE — 63600175 PHARM REV CODE 636 W HCPCS: Performed by: STUDENT IN AN ORGANIZED HEALTH CARE EDUCATION/TRAINING PROGRAM

## 2020-03-18 PROCEDURE — 99900035 HC TECH TIME PER 15 MIN (STAT)

## 2020-03-18 PROCEDURE — 94761 N-INVAS EAR/PLS OXIMETRY MLT: CPT

## 2020-03-18 PROCEDURE — A6209 FOAM DRSG <=16 SQ IN W/O BDR: HCPCS

## 2020-03-18 PROCEDURE — 94770 HC EXHALED C02 TEST: CPT

## 2020-03-18 RX ORDER — ACETAMINOPHEN 650 MG/20.3ML
1000 LIQUID ORAL EVERY 6 HOURS PRN
Status: DISCONTINUED | OUTPATIENT
Start: 2020-03-18 | End: 2020-03-30 | Stop reason: HOSPADM

## 2020-03-18 RX ORDER — DEXTROSE MONOHYDRATE 100 MG/ML
12.5 INJECTION, SOLUTION INTRAVENOUS
Status: DISCONTINUED | OUTPATIENT
Start: 2020-03-18 | End: 2020-03-25

## 2020-03-18 RX ORDER — CARVEDILOL 6.25 MG/1
6.25 TABLET ORAL 2 TIMES DAILY
Status: DISCONTINUED | OUTPATIENT
Start: 2020-03-18 | End: 2020-03-18

## 2020-03-18 RX ORDER — NICARDIPINE HYDROCHLORIDE 0.2 MG/ML
1 INJECTION INTRAVENOUS CONTINUOUS
Status: DISCONTINUED | OUTPATIENT
Start: 2020-03-18 | End: 2020-03-18

## 2020-03-18 RX ORDER — INSULIN ASPART 100 [IU]/ML
1-10 INJECTION, SOLUTION INTRAVENOUS; SUBCUTANEOUS EVERY 4 HOURS PRN
Status: DISCONTINUED | OUTPATIENT
Start: 2020-03-18 | End: 2020-03-25

## 2020-03-18 RX ORDER — INSULIN ASPART 100 [IU]/ML
1-10 INJECTION, SOLUTION INTRAVENOUS; SUBCUTANEOUS EVERY 6 HOURS PRN
Status: DISCONTINUED | OUTPATIENT
Start: 2020-03-18 | End: 2020-03-18

## 2020-03-18 RX ORDER — HYDROXYCHLOROQUINE SULFATE 200 MG/1
400 TABLET, FILM COATED ORAL 2 TIMES DAILY
Status: COMPLETED | OUTPATIENT
Start: 2020-03-18 | End: 2020-03-18

## 2020-03-18 RX ORDER — DEXTROSE MONOHYDRATE 100 MG/ML
25 INJECTION, SOLUTION INTRAVENOUS
Status: DISCONTINUED | OUTPATIENT
Start: 2020-03-18 | End: 2020-03-25

## 2020-03-18 RX ORDER — CARVEDILOL 12.5 MG/1
12.5 TABLET ORAL 2 TIMES DAILY
Status: DISCONTINUED | OUTPATIENT
Start: 2020-03-19 | End: 2020-03-19

## 2020-03-18 RX ORDER — LABETALOL HCL 20 MG/4 ML
20 SYRINGE (ML) INTRAVENOUS ONCE
Status: COMPLETED | OUTPATIENT
Start: 2020-03-18 | End: 2020-03-18

## 2020-03-18 RX ORDER — HYDROXYCHLOROQUINE SULFATE 200 MG/1
200 TABLET, FILM COATED ORAL 2 TIMES DAILY
Status: COMPLETED | OUTPATIENT
Start: 2020-03-19 | End: 2020-03-22

## 2020-03-18 RX ORDER — CARVEDILOL 6.25 MG/1
6.25 TABLET ORAL ONCE
Status: COMPLETED | OUTPATIENT
Start: 2020-03-18 | End: 2020-03-18

## 2020-03-18 RX ORDER — ENOXAPARIN SODIUM 100 MG/ML
40 INJECTION SUBCUTANEOUS EVERY 12 HOURS
Status: DISCONTINUED | OUTPATIENT
Start: 2020-03-18 | End: 2020-03-26

## 2020-03-18 RX ORDER — FENTANYL CITRATE-0.9 % NACL/PF 10 MCG/ML
25 PLASTIC BAG, INJECTION (ML) INTRAVENOUS CONTINUOUS
Status: DISCONTINUED | OUTPATIENT
Start: 2020-03-18 | End: 2020-03-20

## 2020-03-18 RX ORDER — NICARDIPINE HYDROCHLORIDE 0.2 MG/ML
2.5 INJECTION INTRAVENOUS CONTINUOUS
Status: DISCONTINUED | OUTPATIENT
Start: 2020-03-18 | End: 2020-03-25

## 2020-03-18 RX ORDER — HYDRALAZINE HYDROCHLORIDE 20 MG/ML
10 INJECTION INTRAMUSCULAR; INTRAVENOUS ONCE
Status: COMPLETED | OUTPATIENT
Start: 2020-03-18 | End: 2020-03-18

## 2020-03-18 RX ADMIN — CISATRACURIUM BESYLATE 1 MCG/KG/MIN: 10 INJECTION INTRAVENOUS at 04:03

## 2020-03-18 RX ADMIN — PROPOFOL 50 MCG/KG/MIN: 10 INJECTION, EMULSION INTRAVENOUS at 10:03

## 2020-03-18 RX ADMIN — Medication 450 MCG/HR: at 09:03

## 2020-03-18 RX ADMIN — INSULIN ASPART 10 UNITS: 100 INJECTION, SOLUTION INTRAVENOUS; SUBCUTANEOUS at 04:03

## 2020-03-18 RX ADMIN — HYDROXYCHLOROQUINE SULFATE 400 MG: 200 TABLET, FILM COATED ORAL at 08:03

## 2020-03-18 RX ADMIN — ENOXAPARIN SODIUM 40 MG: 100 INJECTION SUBCUTANEOUS at 08:03

## 2020-03-18 RX ADMIN — Medication 200 MCG/HR: at 02:03

## 2020-03-18 RX ADMIN — DEXMEDETOMIDINE HYDROCHLORIDE 1.4 MCG/KG/HR: 100 INJECTION, SOLUTION, CONCENTRATE INTRAVENOUS at 10:03

## 2020-03-18 RX ADMIN — PROPOFOL 50 MCG/KG/MIN: 10 INJECTION, EMULSION INTRAVENOUS at 08:03

## 2020-03-18 RX ADMIN — DEXMEDETOMIDINE HYDROCHLORIDE 1.2 MCG/KG/HR: 100 INJECTION, SOLUTION, CONCENTRATE INTRAVENOUS at 12:03

## 2020-03-18 RX ADMIN — PROPOFOL 50 MCG/KG/MIN: 10 INJECTION, EMULSION INTRAVENOUS at 12:03

## 2020-03-18 RX ADMIN — Medication 500 MCG/HR: at 04:03

## 2020-03-18 RX ADMIN — HYDRALAZINE HYDROCHLORIDE 10 MG: 20 INJECTION INTRAMUSCULAR; INTRAVENOUS at 03:03

## 2020-03-18 RX ADMIN — DEXMEDETOMIDINE HYDROCHLORIDE 0.8 MCG/KG/HR: 100 INJECTION, SOLUTION, CONCENTRATE INTRAVENOUS at 02:03

## 2020-03-18 RX ADMIN — HYDROXYCHLOROQUINE SULFATE 400 MG: 200 TABLET, FILM COATED ORAL at 11:03

## 2020-03-18 RX ADMIN — CARVEDILOL 6.25 MG: 6.25 TABLET, FILM COATED ORAL at 11:03

## 2020-03-18 RX ADMIN — LABETALOL HCL IV SOLN PREFILLED SYRINGE 20 MG/4ML (5 MG/ML) 20 MG: 20/4 SOLUTION PREFILLED SYRINGE at 05:03

## 2020-03-18 RX ADMIN — PROPOFOL 50 MCG/KG/MIN: 10 INJECTION, EMULSION INTRAVENOUS at 09:03

## 2020-03-18 RX ADMIN — ACETAMINOPHEN ORAL SOLUTION 999.01 MG: 650 SOLUTION ORAL at 11:03

## 2020-03-18 RX ADMIN — SODIUM CHLORIDE 4 UNITS/HR: 9 INJECTION, SOLUTION INTRAVENOUS at 08:03

## 2020-03-18 RX ADMIN — INSULIN ASPART 10 UNITS: 100 INJECTION, SOLUTION INTRAVENOUS; SUBCUTANEOUS at 12:03

## 2020-03-18 RX ADMIN — DEXMEDETOMIDINE HYDROCHLORIDE 1.2 MCG/KG/HR: 100 INJECTION, SOLUTION, CONCENTRATE INTRAVENOUS at 08:03

## 2020-03-18 RX ADMIN — DEXMEDETOMIDINE HYDROCHLORIDE 0.2 MCG/KG/HR: 100 INJECTION, SOLUTION, CONCENTRATE INTRAVENOUS at 01:03

## 2020-03-18 RX ADMIN — NICARDIPINE HYDROCHLORIDE 15 MG/HR: 0.2 INJECTION, SOLUTION INTRAVENOUS at 10:03

## 2020-03-18 RX ADMIN — AZITHROMYCIN DIHYDRATE 500 MG: 500 INJECTION, POWDER, LYOPHILIZED, FOR SOLUTION INTRAVENOUS at 09:03

## 2020-03-18 RX ADMIN — NICARDIPINE HYDROCHLORIDE 2.5 MG/HR: 0.2 INJECTION, SOLUTION INTRAVENOUS at 07:03

## 2020-03-18 RX ADMIN — MINERAL OIL AND WHITE PETROLATUM: 150; 830 OINTMENT OPHTHALMIC at 10:03

## 2020-03-18 RX ADMIN — INSULIN ASPART 10 UNITS: 100 INJECTION, SOLUTION INTRAVENOUS; SUBCUTANEOUS at 07:03

## 2020-03-18 RX ADMIN — CISATRACURIUM BESYLATE 1 MCG/KG/MIN: 10 INJECTION INTRAVENOUS at 08:03

## 2020-03-18 RX ADMIN — DEXMEDETOMIDINE HYDROCHLORIDE 1.4 MCG/KG/HR: 100 INJECTION, SOLUTION, CONCENTRATE INTRAVENOUS at 06:03

## 2020-03-18 RX ADMIN — PROPOFOL 50 MCG/KG/MIN: 10 INJECTION, EMULSION INTRAVENOUS at 07:03

## 2020-03-18 RX ADMIN — PROPOFOL 5 MCG/KG/MIN: 10 INJECTION, EMULSION INTRAVENOUS at 03:03

## 2020-03-18 RX ADMIN — ENOXAPARIN SODIUM 40 MG: 100 INJECTION SUBCUTANEOUS at 09:03

## 2020-03-18 RX ADMIN — DEXMEDETOMIDINE HYDROCHLORIDE 1.4 MCG/KG/HR: 100 INJECTION, SOLUTION, CONCENTRATE INTRAVENOUS at 07:03

## 2020-03-18 RX ADMIN — METHYLPREDNISOLONE SODIUM SUCCINATE 40 MG: 40 INJECTION, POWDER, FOR SOLUTION INTRAMUSCULAR; INTRAVENOUS at 09:03

## 2020-03-18 RX ADMIN — PROPOFOL 50 MCG/KG/MIN: 10 INJECTION, EMULSION INTRAVENOUS at 04:03

## 2020-03-18 RX ADMIN — DEXMEDETOMIDINE HYDROCHLORIDE 1.2 MCG/KG/HR: 100 INJECTION, SOLUTION, CONCENTRATE INTRAVENOUS at 05:03

## 2020-03-18 RX ADMIN — CHLORHEXIDINE GLUCONATE 0.12% ORAL RINSE 15 ML: 1.2 LIQUID ORAL at 08:03

## 2020-03-18 RX ADMIN — NICARDIPINE HYDROCHLORIDE 5 MG/HR: 0.2 INJECTION, SOLUTION INTRAVENOUS at 09:03

## 2020-03-18 RX ADMIN — ACETAMINOPHEN ORAL SOLUTION 999.01 MG: 650 SOLUTION ORAL at 03:03

## 2020-03-18 RX ADMIN — CEFTRIAXONE 2 G: 2 INJECTION, SOLUTION INTRAVENOUS at 08:03

## 2020-03-18 RX ADMIN — INSULIN ASPART 10 UNITS: 100 INJECTION, SOLUTION INTRAVENOUS; SUBCUTANEOUS at 09:03

## 2020-03-18 RX ADMIN — PROPOFOL 50 MCG/KG/MIN: 10 INJECTION, EMULSION INTRAVENOUS at 02:03

## 2020-03-18 RX ADMIN — PANTOPRAZOLE SODIUM 40 MG: 40 INJECTION, POWDER, FOR SOLUTION INTRAVENOUS at 08:03

## 2020-03-18 RX ADMIN — CHLORHEXIDINE GLUCONATE 0.12% ORAL RINSE 15 ML: 1.2 LIQUID ORAL at 10:03

## 2020-03-18 RX ADMIN — CARVEDILOL 6.25 MG: 6.25 TABLET, FILM COATED ORAL at 08:03

## 2020-03-18 NOTE — PLAN OF CARE
Patient on COVID-19 precautions.  Performed admission assessment via phone with motherLolita.  Mother stated that address on file is her address, but knows that her daughter lives on [address] is all.    Jocelynn Brooks MD  [address] [address]      [facility] - [place], [place] - [address]  [address] [address]  Phone: [phone] Fax: [phone]    [facility], [place] - [address]  [address] [address]  Phone: [phone] Fax: [phone]    [facility] - [place] - [address]  [address] [place] [address]  Phone: [phone] Fax: [phone]      Extended Emergency Contact Information  Primary Emergency Contact: Lolita Carney   [place]  Mobile Phone: [phone]  Relation: Mother    Payor: / 03/18/20 0956   Discharge Assessment   Assessment Type Discharge Planning Assessment   Confirmed/corrected address and phone number on facesheet? Yes   Assessment information obtained from? Other  (mother)   Prior to hospitilization cognitive status: Alert/Oriented   Prior to hospitalization functional status: Independent   Current cognitive status: Coma/Sedated/Intubated   Current Functional Status: Completely Dependent   Facility Arrived From: [facility]   Lives With alone   Able to Return to Prior Arrangements other (see comments)  (TBD)   Is patient able to care for self after discharge? Unable to determine at this time (comments)   Who are your caregiver(s) and their phone number(s)? mother Rodriguez, [phone]   Patient currently being followed by outpatient case management? No   Patient currently receives any other outside agency services? No   Equipment Currently Used at Home none   Do you have any problems affording any of your prescribed medications? No   Is the patient taking medications as prescribed? yes   Does the patient  have transportation home? Yes   Transportation Anticipated family or friend will provide   Does the patient receive services at the [facility]? No   Discharge Plan A Home   Discharge Plan B Home with family   DME Needed Upon Discharge  other (see comments)  (TBD)       Joaquín Justice RN MSN  Critical Care-   Ext. [phone]

## 2020-03-18 NOTE — PROGRESS NOTES
[facility]  Critical Care Medicine  Progress Note    Patient Name: Riri Gonsalves  MRN: [id number]  Admission Date: 3/17/2020  Hospital Length of Stay: 1 days  Code Status: Full Code  Attending Provider: Favio Sebastian MD  Primary Care Provider: Jocelynn Brooks MD   Principal Problem: Acute respiratory failure    Subjective:     HPI:  Pt is 29yoF w/ PMH DM2, HTN, and benign intracranial HTN who presented to [facility] on 3/13 complaining of shortness of breath, fevers, and cough. 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 According to notes from OSH pt had rhinorrhea & dry cough for several days- went to urgent care where she was told she had flu-like symptoms & was discharged home on amoxicillin. A few days later she was continuing to have fevers as high as 103 & was having worsening SOB at rest- went to [facility] ED & was initially feeling better on 2L NC. Labs on presentation were CRP 18.2, WBC 3.7, flu negative. Pt was admitted to medicine & started on empiric CAP coverage. Pt's condition continued to deteriorate- worsening leukopenia, lymphopenia, elevated LDH, & CRP, & increasing oxygen requirements. CXR showed worsening multifocal airspace disease. Pt was transferred to MICU on 3/14. Pt decompensated &was intubated on 3/15 & on 3/16 results came back positive for Covid 19. Echo showed normal LV function (EF 60%)Pt required fentanyl, precedex, and propofol. Pt also started on methylprednisolone. Pt was proned on 3/16. Pt was supinated prior to transfer.   Pt presents as transfer from [facility] ICU for possible ecmo.     Hospital/ICU Course:  Pt transferred from [facility] ICU on 3/17. Pt was pronated at 1745 on 3/17. Started cardine drip for hypertension & insulin drip for hyperglycemia. Pt supinated at 1100 on 3/18. Pt remains on precedex, fentanyl, and propofol.     Interval History/Significant Events: Pt became hypertensive overnight- requiring cardine drip. Pt also hyperglycemic- started  insulin drip. Pt O2 sat decreased to 86%--> increased FiO2 to 50%. Supinated pt at 11am.     Review of Systems   Unable to perform ROS: Intubated     Objective:     Vital Signs (Most Recent):  Temp: (!) 100.6 °F (38.1 °C) (03/18/20 1100)  Pulse: 105 (03/18/20 1100)  Resp: (!) 31 (03/18/20 1100)  BP: (!) 157/72 (03/18/20 0735)  SpO2: (!) 90 % (03/18/20 1100) Vital Signs (24h Range):  Temp:  [97.9 °F (36.6 °C)-101.2 °F (38.4 °C)] 100.6 °F (38.1 °C)  Pulse:  [] 105  Resp:  [22-34] 31  SpO2:  [86 %-98 %] 90 %  BP: (143-196)/(72-98) 157/72  Arterial Line BP: (128-205)/(71-93) 128/84   Weight: (!) 136.2 kg (300 lb 4.3 oz)  Body mass index is 41.88 kg/m².      Intake/Output Summary (Last 24 hours) at 3/18/2020 1114  Last data filed at 3/18/2020 1100  Gross per 24 hour   Intake 1759.38 ml   Output 2235 ml   Net -475.62 ml       Physical Exam   Constitutional:   Refer to attestation       Vents:  Vent Mode: A/C (03/18/20 1051)  Ventilator Initiated: Yes (03/17/20 1619)  Set Rate: 22 BPM (03/18/20 1051)  Vt Set: 380 mL (03/18/20 1051)  Pressure Support: 3 cmH20 (03/17/20 1700)  PEEP/CPAP: 10 cmH20 (03/18/20 1051)  Oxygen Concentration (%): 50 (03/18/20 1100)  Peak Airway Pressure: 21 cmH2O (03/18/20 1051)  Plateau Pressure: 22 cmH20 (03/18/20 0616)  Total Ve: 12.1 mL (03/18/20 1051)  F/VT Ratio<105 (RSBI): (!) 71.94 (03/18/20 1051)  Lines/Drains/Airways     Drain                 Urethral Catheter 03/17/20 1200 less than 1 day          Airway                 Airway - Non-Surgical -- days          Arterial Line                 Arterial Line 03/17/20  Right Radial 1 day          Peripheral Intravenous Line                 Peripheral IV - Single Lumen 03/17/20  Anterior;Distal;Right Forearm 1 day         Peripheral IV - Single Lumen 03/17/20 Anterior;Proximal;Right Forearm 1 day              Significant Labs:    CBC/Anemia Profile:  Recent Labs   Lab 03/17/20  1558 03/18/20  0300   WBC 5.96 6.72   HGB 11.7* 10.9*   HCT 40.0  37.6    274   MCV 88 87   RDW 14.1 14.3   FERRITIN 368*  --         Chemistries:  Recent Labs   Lab 03/17/20  1558 03/18/20  0300    146*   K 4.2 4.8    106   CO2 16* 25   BUN 10 16   CREATININE 0.8 1.1   CALCIUM 8.5* 9.2   ALBUMIN 1.9* 2.0*   PROT 7.0 7.2   BILITOT 0.3 0.3   ALKPHOS 70 72   ALT 17 16   AST 25 22   MG 2.2 2.1   PHOS 2.9 3.3             ABG  Recent Labs   Lab 03/17/20  1642   PH 7.305*   PO2 67*   PCO2 44.6   HCO3 22.2*   BE -4     Assessment/Plan:     Pulmonary  * Acute respiratory failure  Pt transferred from [facility] on 3/17 for ARDS 2/2 COVID 19. Pt was intubated on 3/15 & is on precedex, propofol, and fentanyl.   On transfer: , ferretin 368, D-Dimer >33  Pt continues to be febrile at 101    Plan:  - supinated pt @ 1100 on 3/18--> f/u repeat ABG @ 3pm  - vent settings decreased to 50% FiO2 & 10 PEEP   - continue methylprednisolone 40mg q6  - continue fentanyl, propofol, and precedex- wean as tolerated  - azithromycin & rocephin for emperic CAP coverage  - f/u daily CRP  - holding on plaquenil b/c waiting for approval of remdesevir    Cardiac/Vascular  Essential hypertension  Home med includes lisinopril 20mg daily & lasix 40mg daily.     Plan:  - nicardipine drip    Endocrine  Type 2 diabetes mellitus without complication, without long-term current use of insulin  Pt has A1c 12.4 (3/17/20) & home med is metformin 500mg BID.     Plan:  - LDSSI   - insulin drip at 4U  - accuchecks Q4  - f/u BMP @ 3pm       Critical Care Daily Checklist:    A: Awake: RASS Goal/Actual Goal: RASS Goal: -4-->deep sedation  Actual: [place] Agitation Sedation Scale (RASS): Deep sedation   B: Spontaneous Breathing Trial Performed?     C: SAT & SBT Coordinated?  N/A                      D: Delirium: CAM-ICU Overall CAM-ICU: Positive   E: Early Mobility Performed? No   F: Feeding Goal: Goals: Meet % EEN, EPN by RD f/u date  Status: Nutrition Goal Status: new   Current Diet Order   Procedures     Diet NPO      AS: Analgesia/Sedation Propofol, fentanyl, precedex   T: Thromboembolic Prophylaxis loveestelaallyn   H: HOB > 300 Yes   U: Stress Ulcer Prophylaxis (if needed) protonix   G: Glucose Control Insulin drip   B: Bowel Function Stool Occurrence: 0   I: Indwelling Catheter (Lines & Saxena) Necessity Saxena, a line, 2 PIV, ET tube   D: De-escalation of Antimicrobials/Pharmacotherapies     Plan for the day/ETD Supinated at 1100    Code Status:  Family/Goals of Care: Full Code         Critical secondary to Patient has a condition that poses threat to life and bodily function: acute respiratory failure 2/2 COVID-19      Critical care was time spent personally by me on the following activities: development of treatment plan with patient or surrogate and bedside caregivers, discussions with consultants, evaluation of patient's response to treatment, examination of patient, ordering and performing treatments and interventions, ordering and review of laboratory studies, ordering and review of radiographic studies, pulse oximetry, re-evaluation of patient's condition. This critical care time did not overlap with that of any other provider or involve time for any procedures.     Veda Gan MD  Critical Care Medicine  [facility]

## 2020-03-18 NOTE — NURSING
Notified Dr Henderson that patient's sbp has been running low 170s. Notify if sbp >190 per md. WCJANNETTE.

## 2020-03-18 NOTE — ASSESSMENT & PLAN NOTE
Pt has A1c 12.4 (3/17/20) & home med is metformin 500mg BID.     Plan:  - LDSSI   - insulin drip at 4U  - accuchecks Q4  - f/u BMP @ 3pm

## 2020-03-18 NOTE — NURSING
Pt transferred from [facility]. ABGs completed. Pt proned @ 6008. Will remain prone for 16 hours. Pt's sats 95%. Pt on fent and prop. Saxena in place. Afebrile. Skin breakdown to left shin. Yellow pink wound bed. WCTM.

## 2020-03-18 NOTE — PLAN OF CARE
Received records from [facility] via fax.  Handed faxes to Dr. Gan.

## 2020-03-18 NOTE — NURSING
Notified Dr Lee that patient's sbp on jeovanny is 190s sustaining last 10-15 min. Awaiting orders. wctm.

## 2020-03-18 NOTE — EICU
Rounding (Video Assessment): Video rounds complete. Pt in prone position. Remains on ventilator with sedation. No distress noted. VSS

## 2020-03-18 NOTE — PROCEDURES
"Riri Gonsalves is a 29 y.o. female patient.    Temp: (!) 102 °F (38.9 °C) (20 1524)  Pulse: 92 (20 1700)  Resp: (!) 22 (20 1700)  BP: (!) 165/79 (20 1300)  SpO2: 98 % (20 1700)  Weight: (!) 136.2 kg (300 lb 4.3 oz) (20 1000)  Height: 5' 11" (180.3 cm) (20 1000)       Arterial Line  Date/Time: 3/18/2020 5:20 PM  Location procedure was performed: [facility] CRITICAL CARE MEDICINE  Performed by: Chano Miller MD  Authorized by: Chano Miller MD   Pre-op Diagnosis: Pneumonia secondary to COVID-19  Post-operative diagnosis: Pneumonia secondary to COVID-19  Consent Done: Yes  Consent: Written consent obtained.  Risks and benefits: risks, benefits and alternatives were discussed  Consent given by: parent  Patient understanding: patient states understanding of the procedure being performed  Patient consent: the patient's understanding of the procedure matches consent given  Procedure consent: procedure consent matches procedure scheduled  Relevant documents: relevant documents present and verified  Test results: test results available and properly labeled  Site marked: the operative site was marked  Imaging studies: imaging studies available  Patient identity confirmed: , MRN, name and provided demographic data  Time out: Immediately prior to procedure a "time out" was called to verify the correct patient, procedure, equipment, support staff and site/side marked as required.  Preparation: Patient was prepped and draped in the usual sterile fashion.  Indications: multiple ABGs, respiratory failure and hemodynamic monitoring  Location: right radial  Gold's test normal: yes  Needle gauge: 18  Seldinger technique: Seldinger technique used  Number of attempts: 1  Complications: No  Estimated blood loss (mL): 2  Specimens: No  Implants: No  Post-procedure: line sutured and dressing applied  Post-procedure CMS: normal  Patient tolerance: Patient tolerated the procedure well with no " immediate complications          Chano Miller  3/18/2020

## 2020-03-18 NOTE — NURSING
Notified MD that sbp per jeovanny remains >190s, nibp sbp 150s, O2 sats 90% and cbg 430. Awainting incoming orders. WCTM.

## 2020-03-18 NOTE — CONSULTS
"  [facility]  Adult Nutrition  Consult Note    SUMMARY     Recommendations    1. When medically feasible, initiate enteral nutrition.    - With current Propofol rate, rec'd Peptamen Intense VHP @ 50 mL/hr to provide 2280 kcals, 110 g of protein, 1008 mL fluid.    - Propofol currently providing 1080 calories.    - When Propofol discontinued, rec'd Glucerna 1.5 @ 60 mL/hr to provide 2160 kcals, 119 g of protein, 1093 mL fluid.  2. RD to monitor & follow-up.    Goals: Meet % EEN, EPN by RD f/u date  Nutrition Goal Status: new  Communication of RD Recs: reviewed with RN    Reason for Assessment    Reason For Assessment: consult  Diagnosis: other (see comments)(Resp. fx)  Relevant Medical History: HTN, DM  Interdisciplinary Rounds: attended    General Information Comments: Pt intubated, sedated, proned. Remote access 2/2 to pt positive for COVID-19. No wt history found in chart review. Due to contact/airborne precautions, NFPE unable to be assessed at this time. Will monitor. Pt inappropriate for diabetic diet education (A1C 12.4).  Nutrition Discharge Planning: Unable to determine    Nutrition/Diet History    Spiritual, Cultural Beliefs, Sabianist Practices, Values that Affect Care: no  Factors Affecting Nutritional Intake: NPO, on mechanical ventilation    Anthropometrics    Temp: (!) 101.2 °F (38.4 °C)  Height Method: (obtained from previous record)  Height: 5' 11" (180.3 cm)  Height (inches): 71 in  Weight Method: Bed Scale  Weight: (!) 136.2 kg (300 lb 4.3 oz)  Weight (lb): (!) 300.27 lb  Ideal Body Weight (IBW), Female: 155 lb  % Ideal Body Weight, Female (lb): 193.72 %  BMI (Calculated): 41.9  BMI Grade: greater than 40 - morbid obesity    Lab/Procedures/Meds    Pertinent Labs Reviewed: reviewed  Pertinent Labs Comments: Na 146, A1C 12.4  Pertinent Medications Reviewed: reviewed  Pertinent Medications Comments: Precedex, Fentanyl, Insulin, Nicardipine, Propofol    Estimated/Assessed " Needs    Weight Used For Calorie Calculations: (!) 136.2 kg (300 lb 4.3 oz)     Energy Calorie Requirements (kcal): 2252 kcal/d  Energy Need Method: [place]     Protein Requirements: 106-141 g/d (1.5-2 g/kg IBW)  Weight Used For Protein Calculations: 70.5 kg (155 lb 6.8 oz)     Estimated Fluid Requirement Method: other (see comments)(Per MD or 1 mL/kcal)     CHO Requirement: 50% total kcals    Nutrition Prescription Ordered    Current Diet Order: NPO    Evaluation of Received Nutrient/Fluid Intake    Other Calories (kcal): 1080 (Propofol)    Comments: LBM: 3/15    Nutrition Risk    Level of Risk/Frequency of Follow-up: (2x/week)     Assessment and Plan    Nutrition Problem  Inadequate energy intake    Related to (etiology):   Inability to consume sufficient energy    Signs and Symptoms (as evidenced by):   NPO    Interventions(treatment strategy):  Collaboration of nutrition care w/ other providers     Nutrition Diagnosis Status:   New     Monitor and Evaluation    Food and Nutrient Intake: energy intake, food and beverage intake, enteral nutrition intake  Food and Nutrient Adminstration: diet order, enteral and parenteral nutrition administration  Physical Activity and Function: nutrition-related ADLs and IADLs  Anthropometric Measurements: weight, weight change  Biochemical Data, Medical Tests and Procedures: inflammatory profile, lipid profile, glucose/endocrine profile, gastrointestinal profile, electrolyte and renal panel  Nutrition-Focused Physical Findings: overall appearance     Nutrition Follow-Up    RD Follow-up?: Yes

## 2020-03-18 NOTE — EICU
Rounding (Video Assessment):  Yes    Intervention Initiated From:  EICU    Mitzi Communicated with Bedside Nurse regarding:  Video rounding    Nurse Notified:  Yes    Doctor Notified:  No    Comments: RN at bedside. Updated and charted new a-line.

## 2020-03-18 NOTE — CONSULTS
Wound care consulted for left shin.   PMH:  DM2, HTN, benign intracranial HTN, shortness of breath, fevers, cough. Transferred from [facility] for possible ecmo.  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 The wound bed is pink/moist with scant amount of serosanguineous drainage. The wound edges are irregular with darkened edges and the danna-wound has dark pigmentation.    Recommendations:  HydroferaBlue Ready dressing over the wound bed, cover with a foam dressing 2 x week.   Pressure prevention measures in place.  Discussed with Dr. Young and approved recommendations/assessment  Nursing to continue care, wound care will follow-up ulices Gonzalez RN, CWCN  [id number]    Left shin   2 cm L x 2 cm W x 0.2 cm D

## 2020-03-18 NOTE — NURSING
Notified MD of continued glucose > 400, with next accu check at 0600. Awaiting orders to be placed. WCTM.

## 2020-03-18 NOTE — NURSING
Pt turned supine with 5 RNs, 1 MD and 2 RT at head of bed @ 1040. Will obtain ABG @ 1440. Pt in no distress at this time.

## 2020-03-18 NOTE — PLAN OF CARE
Pt turned supine @ 1040. Gas rechecked @ 1440. Decision made to prone and paralyze pt. Pt currently on 50 % fiO2 and PEEP 12. TOF 4/4 @ 7 to left ulnar. BIS in place. Saxena in place. OGT in place. Cardene initiated for SBP < 180. Pt spiked temp 102, ice bags placed and tylenol administered. Wound care completed. Cardene, Nimbex, Fentanyl, propofol, precedex, insulin infusing. BG monitored q4h with SSI administered with a set rate of 4 cc/hr insulin continuous. Mother called and updated with POC.     Fentanyl infusing @ 500 mcg/hour.     CMICU DAILY GOALS       A: Awake    RASS: Goal - RASS Goal: -5-->unarousable  Actual - RASS ([place] Agitation-Sedation Scale): -5-->unarousable   Restraint necessity: Clinical Justification: Treatment Interference, Removing medical devices  B: Breath   SBT: Not attempted   C: Coordinate A & B, analgesics/sedatives   Pain: managed    SAT: Not attempted  D: Delirium   CAM-ICU: Overall CAM-ICU: Positive  E: Early Mobility   MOVE Screen: Fail   Activity: Activity Management: bedrest maintained per order  FAS: Feeding/Nutrition   Diet order: Diet/Nutrition Received: NPO,   Fluid restriction:    T: Thrombus   DVT prophylaxis: VTE Required Core Measure: Pharmacological prophylaxis initiated/maintained  H: HOB Elevation   Head of Bed (HOB): HOB flat  U: Ulcer Prophylaxis   GI: yes  G: Glucose control   uncontrolled Glycemic Management: blood glucose monitoring  S: Skin   Bundle compliance: yes   Bathing/Skin Care: bath, chlorhexidine, bath, complete, dressed/undressed, incontinence care, linen changed Date: 3/18/20  B: Bowel Function   no issues   I: Indwelling Catheters   Saxena necessity:      Urethral Catheter 03/17/20 1200-Reason for Continuing Urinary Catheterization: Critically ill in ICU requiring intensive monitoring   CVC necessity: Yes   IPAD offered: Not appropriate  D: De-escalation Antibx   Yes  Plan for the day   Supinate, pronate.   Family/Goals of care/Code Status   Code  Status: Full Code     No acute events throughout day, VS and assessment per flow sheet, patient progressing towards goals as tolerated, plan of care reviewed with Riri Gonsalves and family, all concerns addressed, will continue to monitor.

## 2020-03-18 NOTE — PLAN OF CARE
CMICU DAILY GOALS       A: Awake    RASS: Goal - RASS Goal: -4-->deep sedation  Actual - RASS ([place] Agitation-Sedation Scale): -4-->deep sedation   Restraint necessity: Clinical Justification: Removing medical devices  B: Breath   SBT: Not attempted   C: Coordinate A & B, analgesics/sedatives   Pain: managed    SAT: Not attempted  D: Delirium   CAM-ICU: Overall CAM-ICU: Positive  E: Early Mobility   MOVE Screen: Fail   Activity: Activity Management: bedrest maintained per order(patient proned)  FAS: Feeding/Nutrition   Diet order: Diet/Nutrition Received: NPO,   Fluid restriction:    T: Thrombus   DVT prophylaxis: VTE Required Core Measure: Pharmacological prophylaxis initiated/maintained  H: HOB Elevation   Head of Bed (HOB): HOB flat  U: Ulcer Prophylaxis   GI: yes  G: Glucose control   uncontrolled Glycemic Management: blood glucose monitoring  S: Skin   Bundle compliance: yes   Bathing/Skin Care: bath, chlorhexidine, bath, partial Date: [unfilled] 3/18/20  B: Bowel Function   no issues   I: Indwelling Catheters   Saxena necessity:      Urethral Catheter 03/17/20 1200-Reason for Continuing Urinary Catheterization: Critically ill in ICU requiring intensive monitoring   CVC necessity: Yes   IPAD offered: Not appropriate  D: De-escalation Antibx   Yes  Plan for the day   Wean O2, stabilize BP and cbg, monitor vitals and respiratory status frequently, labs per order.  Family/Goals of care/Code Status   Code Status: Full Code     VS and assessment per flow sheet, patient progressing towards goals as tolerated, plan of care reviewed with Riri Gonsalves and family, all concerns addressed, will continue to monitor.

## 2020-03-18 NOTE — HOSPITAL COURSE
Patient was transferred intubated and sedated from [facility] ICU for possible ECMO given hypoxic respiratory failure due to ARDS 2/2 COVID-19 and was proned on 3/17. Cardine started for HTN and insulin drip for hyperglycemia (Hx uncontrolled T2DM). On 3/18 she was supinated with improvement of vent settings and oxygenation. Started on Remdesivir 200mg on 3/23, after receiving Plaquenil and azithromycin. Patient successfully extubated to BiPAP 55% Fio2, 15/5  on 3/24 and transitioned to comfort flow on 3/25 and has tolerated well.

## 2020-03-18 NOTE — PLAN OF CARE
Recommendations     1. When medically feasible, initiate enteral nutrition.               - With current Propofol rate, rec'd Peptamen Intense VHP @ 50 mL/hr to provide 2280 kcals, 110 g of protein, 1008 mL fluid.                          - Propofol currently providing 1080 calories.               - When Propofol discontinued, rec'd Glucerna 1.5 @ 60 mL/hr to provide 2160 kcals, 119 g of protein, 1093 mL fluid.  2. RD to monitor & follow-up.     Goals: Meet % EEN, EPN by RD f/u date  Nutrition Goal Status: new  Communication of RD Recs: reviewed with RN

## 2020-03-18 NOTE — SUBJECTIVE & OBJECTIVE
Interval History/Significant Events: Pt became hypertensive overnight- requiring cardine drip. Pt also hyperglycemic- started insulin drip. Pt O2 sat decreased to 86%--> increased FiO2 to 50%. Supinated pt at 11am.     Review of Systems   Unable to perform ROS: Intubated     Objective:     Vital Signs (Most Recent):  Temp: (!) 100.6 °F (38.1 °C) (03/18/20 1100)  Pulse: 105 (03/18/20 1100)  Resp: (!) 31 (03/18/20 1100)  BP: (!) 157/72 (03/18/20 0735)  SpO2: (!) 90 % (03/18/20 1100) Vital Signs (24h Range):  Temp:  [97.9 °F (36.6 °C)-101.2 °F (38.4 °C)] 100.6 °F (38.1 °C)  Pulse:  [] 105  Resp:  [22-34] 31  SpO2:  [86 %-98 %] 90 %  BP: (143-196)/(72-98) 157/72  Arterial Line BP: (128-205)/(71-93) 128/84   Weight: (!) 136.2 kg (300 lb 4.3 oz)  Body mass index is 41.88 kg/m².      Intake/Output Summary (Last 24 hours) at 3/18/2020 1114  Last data filed at 3/18/2020 1100  Gross per 24 hour   Intake 1759.38 ml   Output 2235 ml   Net -475.62 ml       Physical Exam   Constitutional:   Refer to attestation       Vents:  Vent Mode: A/C (03/18/20 1051)  Ventilator Initiated: Yes (03/17/20 1619)  Set Rate: 22 BPM (03/18/20 1051)  Vt Set: 380 mL (03/18/20 1051)  Pressure Support: 3 cmH20 (03/17/20 1700)  PEEP/CPAP: 10 cmH20 (03/18/20 1051)  Oxygen Concentration (%): 50 (03/18/20 1100)  Peak Airway Pressure: 21 cmH2O (03/18/20 1051)  Plateau Pressure: 22 cmH20 (03/18/20 0616)  Total Ve: 12.1 mL (03/18/20 1051)  F/VT Ratio<105 (RSBI): (!) 71.94 (03/18/20 1051)  Lines/Drains/Airways     Drain                 Urethral Catheter 03/17/20 1200 less than 1 day          Airway                 Airway - Non-Surgical -- days          Arterial Line                 Arterial Line 03/17/20  Right Radial 1 day          Peripheral Intravenous Line                 Peripheral IV - Single Lumen 03/17/20  Anterior;Distal;Right Forearm 1 day         Peripheral IV - Single Lumen 03/17/20 Anterior;Proximal;Right Forearm 1 day               Significant Labs:    CBC/Anemia Profile:  Recent Labs   Lab 03/17/20  1558 03/18/20  0300   WBC 5.96 6.72   HGB 11.7* 10.9*   HCT 40.0 37.6    274   MCV 88 87   RDW 14.1 14.3   FERRITIN 368*  --         Chemistries:  Recent Labs   Lab 03/17/20  1558 03/18/20  0300    146*   K 4.2 4.8    106   CO2 16* 25   BUN 10 16   CREATININE 0.8 1.1   CALCIUM 8.5* 9.2   ALBUMIN 1.9* 2.0*   PROT 7.0 7.2   BILITOT 0.3 0.3   ALKPHOS 70 72   ALT 17 16   AST 25 22   MG 2.2 2.1   PHOS 2.9 3.3

## 2020-03-18 NOTE — NURSING
MD notified that patient's sbp per jeovanny is sustaining > 190 despite prn meds given. No new orders at this time. wctm.

## 2020-03-18 NOTE — ASSESSMENT & PLAN NOTE
Pt transferred from [facility] on 3/17 for ARDS 2/2 COVID 19. Pt was intubated on 3/15 & is on precedex, propofol, and fentanyl.   On transfer: , ferretin 368, D-Dimer >33  Pt continues to be febrile at 101    Plan:  - supinated pt @ 1100 on 3/18--> f/u repeat ABG @ 3pm  - vent settings decreased to 50% FiO2 & 10 PEEP   - continue methylprednisolone 40mg q6  - continue fentanyl, propofol, and precedex- wean as tolerated  - azithromycin & rocephin for emperic CAP coverage  - f/u daily CRP  - holding on plaquenil b/c waiting for approval of remdesevir

## 2020-03-19 LAB
ALBUMIN SERPL BCP-MCNC: 2 G/DL (ref 3.5–5.2)
ALLENS TEST: ABNORMAL
ALLENS TEST: ABNORMAL
ALP SERPL-CCNC: 59 U/L (ref 55–135)
ALT SERPL W/O P-5'-P-CCNC: 14 U/L (ref 10–44)
ANION GAP SERPL CALC-SCNC: 9 MMOL/L (ref 8–16)
AST SERPL-CCNC: 13 U/L (ref 10–40)
BASOPHILS # BLD AUTO: 0.01 K/UL (ref 0–0.2)
BASOPHILS NFR BLD: 0.2 % (ref 0–1.9)
BILIRUB SERPL-MCNC: 0.2 MG/DL (ref 0.1–1)
BUN SERPL-MCNC: 11 MG/DL (ref 6–20)
CALCIUM SERPL-MCNC: 8.2 MG/DL (ref 8.7–10.5)
CHLORIDE SERPL-SCNC: 108 MMOL/L (ref 95–110)
CO2 SERPL-SCNC: 29 MMOL/L (ref 23–29)
CREAT SERPL-MCNC: 0.8 MG/DL (ref 0.5–1.4)
CRP SERPL-MCNC: 126 MG/L (ref 0–8.2)
DELSYS: ABNORMAL
DELSYS: ABNORMAL
DIFFERENTIAL METHOD: ABNORMAL
EOSINOPHIL # BLD AUTO: 0 K/UL (ref 0–0.5)
EOSINOPHIL NFR BLD: 0 % (ref 0–8)
ERYTHROCYTE [DISTWIDTH] IN BLOOD BY AUTOMATED COUNT: 14 % (ref 11.5–14.5)
ERYTHROCYTE [SEDIMENTATION RATE] IN BLOOD BY WESTERGREN METHOD: 22 MM/H
ERYTHROCYTE [SEDIMENTATION RATE] IN BLOOD BY WESTERGREN METHOD: 24 MM/H
EST. GFR  (AFRICAN AMERICAN): >60 ML/MIN/1.73 M^2
EST. GFR  (NON AFRICAN AMERICAN): >60 ML/MIN/1.73 M^2
ETCO2: 44
FIO2: 50
FIO2: 50
GLUCOSE SERPL-MCNC: 353 MG/DL (ref 70–110)
GLUCOSE-6-PHOSPHATE DEHYDROGENASE QUAL: NORMAL
HCG INTACT+B SERPL-ACNC: <1.2 MIU/ML
HCO3 UR-SCNC: 31.6 MMOL/L (ref 24–28)
HCO3 UR-SCNC: 34.3 MMOL/L (ref 24–28)
HCT VFR BLD AUTO: 35.6 % (ref 37–48.5)
HGB BLD-MCNC: 10.3 G/DL (ref 12–16)
IMM GRANULOCYTES # BLD AUTO: 0.11 K/UL (ref 0–0.04)
IMM GRANULOCYTES NFR BLD AUTO: 2.3 % (ref 0–0.5)
LYMPHOCYTES # BLD AUTO: 0.7 K/UL (ref 1–4.8)
LYMPHOCYTES NFR BLD: 14.6 % (ref 18–48)
MAGNESIUM SERPL-MCNC: 2.3 MG/DL (ref 1.6–2.6)
MCH RBC QN AUTO: 25.6 PG (ref 27–31)
MCHC RBC AUTO-ENTMCNC: 28.9 G/DL (ref 32–36)
MCV RBC AUTO: 88 FL (ref 82–98)
MIN VOL: 9.28
MODE: ABNORMAL
MODE: ABNORMAL
MONOCYTES # BLD AUTO: 0.5 K/UL (ref 0.3–1)
MONOCYTES NFR BLD: 9.4 % (ref 4–15)
NEUTROPHILS # BLD AUTO: 3.6 K/UL (ref 1.8–7.7)
NEUTROPHILS NFR BLD: 73.5 % (ref 38–73)
NRBC BLD-RTO: 0 /100 WBC
PCO2 BLDA: 50 MMHG (ref 35–45)
PCO2 BLDA: 57.4 MMHG (ref 35–45)
PEEP: 12
PEEP: 12
PH SMN: 7.38 [PH] (ref 7.35–7.45)
PH SMN: 7.41 [PH] (ref 7.35–7.45)
PHOSPHATE SERPL-MCNC: 2.3 MG/DL (ref 2.7–4.5)
PIP: 34
PLATELET # BLD AUTO: 307 K/UL (ref 150–350)
PMV BLD AUTO: 10 FL (ref 9.2–12.9)
PO2 BLDA: 64 MMHG (ref 80–100)
PO2 BLDA: 66 MMHG (ref 80–100)
POC BE: 7 MMOL/L
POC BE: 9 MMOL/L
POC SATURATED O2: 92 % (ref 95–100)
POC SATURATED O2: 92 % (ref 95–100)
POC TCO2: 33 MMOL/L (ref 23–27)
POC TCO2: 36 MMOL/L (ref 23–27)
POCT GLUCOSE: 239 MG/DL (ref 70–110)
POCT GLUCOSE: 249 MG/DL (ref 70–110)
POCT GLUCOSE: 323 MG/DL (ref 70–110)
POCT GLUCOSE: 330 MG/DL (ref 70–110)
POCT GLUCOSE: 357 MG/DL (ref 70–110)
POTASSIUM SERPL-SCNC: 4.4 MMOL/L (ref 3.5–5.1)
PROT SERPL-MCNC: 6.7 G/DL (ref 6–8.4)
RBC # BLD AUTO: 4.03 M/UL (ref 4–5.4)
SAMPLE: ABNORMAL
SAMPLE: ABNORMAL
SITE: ABNORMAL
SITE: ABNORMAL
SODIUM SERPL-SCNC: 146 MMOL/L (ref 136–145)
SP02: 92
SP02: 93
TRIGL SERPL-MCNC: 895 MG/DL (ref 30–150)
VT: 380
VT: 380
WBC # BLD AUTO: 4.87 K/UL (ref 3.9–12.7)

## 2020-03-19 PROCEDURE — 25000003 PHARM REV CODE 250: Performed by: STUDENT IN AN ORGANIZED HEALTH CARE EDUCATION/TRAINING PROGRAM

## 2020-03-19 PROCEDURE — 94761 N-INVAS EAR/PLS OXIMETRY MLT: CPT

## 2020-03-19 PROCEDURE — 85025 COMPLETE CBC W/AUTO DIFF WBC: CPT

## 2020-03-19 PROCEDURE — 82803 BLOOD GASES ANY COMBINATION: CPT

## 2020-03-19 PROCEDURE — 94770 HC EXHALED C02 TEST: CPT

## 2020-03-19 PROCEDURE — 99233 PR SUBSEQUENT HOSPITAL CARE,LEVL III: ICD-10-PCS | Mod: ,,, | Performed by: INTERNAL MEDICINE

## 2020-03-19 PROCEDURE — 63600175 PHARM REV CODE 636 W HCPCS: Performed by: STUDENT IN AN ORGANIZED HEALTH CARE EDUCATION/TRAINING PROGRAM

## 2020-03-19 PROCEDURE — 84478 ASSAY OF TRIGLYCERIDES: CPT

## 2020-03-19 PROCEDURE — 63600175 PHARM REV CODE 636 W HCPCS: Performed by: INTERNAL MEDICINE

## 2020-03-19 PROCEDURE — 25000003 PHARM REV CODE 250: Performed by: INTERNAL MEDICINE

## 2020-03-19 PROCEDURE — 99233 SBSQ HOSP IP/OBS HIGH 50: CPT | Mod: ,,, | Performed by: INTERNAL MEDICINE

## 2020-03-19 PROCEDURE — 86140 C-REACTIVE PROTEIN: CPT

## 2020-03-19 PROCEDURE — 83735 ASSAY OF MAGNESIUM: CPT

## 2020-03-19 PROCEDURE — 93005 ELECTROCARDIOGRAM TRACING: CPT

## 2020-03-19 PROCEDURE — 99900035 HC TECH TIME PER 15 MIN (STAT)

## 2020-03-19 PROCEDURE — 93010 EKG 12-LEAD: ICD-10-PCS | Mod: ,,, | Performed by: INTERNAL MEDICINE

## 2020-03-19 PROCEDURE — 37799 UNLISTED PX VASCULAR SURGERY: CPT

## 2020-03-19 PROCEDURE — 94003 VENT MGMT INPAT SUBQ DAY: CPT

## 2020-03-19 PROCEDURE — 99900026 HC AIRWAY MAINTENANCE (STAT)

## 2020-03-19 PROCEDURE — 84702 CHORIONIC GONADOTROPIN TEST: CPT

## 2020-03-19 PROCEDURE — 93010 ELECTROCARDIOGRAM REPORT: CPT | Mod: ,,, | Performed by: INTERNAL MEDICINE

## 2020-03-19 PROCEDURE — C9113 INJ PANTOPRAZOLE SODIUM, VIA: HCPCS | Performed by: STUDENT IN AN ORGANIZED HEALTH CARE EDUCATION/TRAINING PROGRAM

## 2020-03-19 PROCEDURE — 20000000 HC ICU ROOM

## 2020-03-19 PROCEDURE — 82960 TEST FOR G6PD ENZYME: CPT

## 2020-03-19 PROCEDURE — 27000221 HC OXYGEN, UP TO 24 HOURS

## 2020-03-19 PROCEDURE — 87040 BLOOD CULTURE FOR BACTERIA: CPT

## 2020-03-19 PROCEDURE — 80053 COMPREHEN METABOLIC PANEL: CPT

## 2020-03-19 PROCEDURE — 84100 ASSAY OF PHOSPHORUS: CPT

## 2020-03-19 RX ORDER — HYDRALAZINE HYDROCHLORIDE 50 MG/1
100 TABLET, FILM COATED ORAL EVERY 8 HOURS
Status: DISCONTINUED | OUTPATIENT
Start: 2020-03-19 | End: 2020-03-26

## 2020-03-19 RX ORDER — CARVEDILOL 25 MG/1
25 TABLET ORAL 2 TIMES DAILY
Status: DISCONTINUED | OUTPATIENT
Start: 2020-03-19 | End: 2020-03-26

## 2020-03-19 RX ADMIN — CARVEDILOL 12.5 MG: 12.5 TABLET, FILM COATED ORAL at 08:03

## 2020-03-19 RX ADMIN — Medication 450 MCG/HR: at 08:03

## 2020-03-19 RX ADMIN — METHYLPREDNISOLONE SODIUM SUCCINATE 40 MG: 40 INJECTION, POWDER, FOR SOLUTION INTRAMUSCULAR; INTRAVENOUS at 08:03

## 2020-03-19 RX ADMIN — DEXMEDETOMIDINE HYDROCHLORIDE 1.4 MCG/KG/HR: 100 INJECTION, SOLUTION, CONCENTRATE INTRAVENOUS at 05:03

## 2020-03-19 RX ADMIN — DEXMEDETOMIDINE HYDROCHLORIDE 1.4 MCG/KG/HR: 100 INJECTION, SOLUTION, CONCENTRATE INTRAVENOUS at 12:03

## 2020-03-19 RX ADMIN — SODIUM PHOSPHATE, MONOBASIC, MONOHYDRATE 15 MMOL: 276; 142 INJECTION, SOLUTION INTRAVENOUS at 01:03

## 2020-03-19 RX ADMIN — DEXMEDETOMIDINE HYDROCHLORIDE 1.4 MCG/KG/HR: 100 INJECTION, SOLUTION, CONCENTRATE INTRAVENOUS at 09:03

## 2020-03-19 RX ADMIN — MIDAZOLAM 1 MG/HR: 5 INJECTION INTRAMUSCULAR; INTRAVENOUS at 10:03

## 2020-03-19 RX ADMIN — ENOXAPARIN SODIUM 40 MG: 100 INJECTION SUBCUTANEOUS at 08:03

## 2020-03-19 RX ADMIN — NICARDIPINE HYDROCHLORIDE 15 MG/HR: 0.2 INJECTION, SOLUTION INTRAVENOUS at 08:03

## 2020-03-19 RX ADMIN — DEXMEDETOMIDINE HYDROCHLORIDE 1.4 MCG/KG/HR: 100 INJECTION, SOLUTION, CONCENTRATE INTRAVENOUS at 11:03

## 2020-03-19 RX ADMIN — ACETAMINOPHEN ORAL SOLUTION 999.01 MG: 650 SOLUTION ORAL at 10:03

## 2020-03-19 RX ADMIN — HYDRALAZINE HYDROCHLORIDE 100 MG: 50 TABLET ORAL at 09:03

## 2020-03-19 RX ADMIN — HYDROXYCHLOROQUINE SULFATE 200 MG: 200 TABLET, FILM COATED ORAL at 08:03

## 2020-03-19 RX ADMIN — INSULIN ASPART 8 UNITS: 100 INJECTION, SOLUTION INTRAVENOUS; SUBCUTANEOUS at 05:03

## 2020-03-19 RX ADMIN — NICARDIPINE HYDROCHLORIDE 10 MG/HR: 0.2 INJECTION, SOLUTION INTRAVENOUS at 03:03

## 2020-03-19 RX ADMIN — CEFTRIAXONE 2 G: 2 INJECTION, SOLUTION INTRAVENOUS at 08:03

## 2020-03-19 RX ADMIN — CARVEDILOL 25 MG: 25 TABLET, FILM COATED ORAL at 08:03

## 2020-03-19 RX ADMIN — MINERAL OIL AND WHITE PETROLATUM: 150; 830 OINTMENT OPHTHALMIC at 06:03

## 2020-03-19 RX ADMIN — PROPOFOL 40 MCG/KG/MIN: 10 INJECTION, EMULSION INTRAVENOUS at 08:03

## 2020-03-19 RX ADMIN — INSULIN ASPART 8 UNITS: 100 INJECTION, SOLUTION INTRAVENOUS; SUBCUTANEOUS at 02:03

## 2020-03-19 RX ADMIN — NICARDIPINE HYDROCHLORIDE 15 MG/HR: 0.2 INJECTION, SOLUTION INTRAVENOUS at 06:03

## 2020-03-19 RX ADMIN — DEXMEDETOMIDINE HYDROCHLORIDE 1.4 MCG/KG/HR: 100 INJECTION, SOLUTION, CONCENTRATE INTRAVENOUS at 04:03

## 2020-03-19 RX ADMIN — SODIUM CHLORIDE 6 UNITS/HR: 9 INJECTION, SOLUTION INTRAVENOUS at 01:03

## 2020-03-19 RX ADMIN — DEXMEDETOMIDINE HYDROCHLORIDE 1.4 MCG/KG/HR: 100 INJECTION, SOLUTION, CONCENTRATE INTRAVENOUS at 02:03

## 2020-03-19 RX ADMIN — NICARDIPINE HYDROCHLORIDE 15 MG/HR: 0.2 INJECTION, SOLUTION INTRAVENOUS at 03:03

## 2020-03-19 RX ADMIN — HYDRALAZINE HYDROCHLORIDE 100 MG: 50 TABLET ORAL at 02:03

## 2020-03-19 RX ADMIN — DEXMEDETOMIDINE HYDROCHLORIDE 1.4 MCG/KG/HR: 100 INJECTION, SOLUTION, CONCENTRATE INTRAVENOUS at 08:03

## 2020-03-19 RX ADMIN — CISATRACURIUM BESYLATE 1 MCG/KG/MIN: 10 INJECTION INTRAVENOUS at 03:03

## 2020-03-19 RX ADMIN — DEXMEDETOMIDINE HYDROCHLORIDE 1.4 MCG/KG/HR: 100 INJECTION, SOLUTION, CONCENTRATE INTRAVENOUS at 10:03

## 2020-03-19 RX ADMIN — PANTOPRAZOLE SODIUM 40 MG: 40 INJECTION, POWDER, FOR SOLUTION INTRAVENOUS at 08:03

## 2020-03-19 RX ADMIN — PROPOFOL 50 MCG/KG/MIN: 10 INJECTION, EMULSION INTRAVENOUS at 02:03

## 2020-03-19 RX ADMIN — NICARDIPINE HYDROCHLORIDE 15 MG/HR: 0.2 INJECTION, SOLUTION INTRAVENOUS at 12:03

## 2020-03-19 RX ADMIN — AZITHROMYCIN DIHYDRATE 500 MG: 500 INJECTION, POWDER, LYOPHILIZED, FOR SOLUTION INTRAVENOUS at 08:03

## 2020-03-19 RX ADMIN — DEXMEDETOMIDINE HYDROCHLORIDE 1.4 MCG/KG/HR: 100 INJECTION, SOLUTION, CONCENTRATE INTRAVENOUS at 06:03

## 2020-03-19 RX ADMIN — Medication 400 MCG/HR: at 12:03

## 2020-03-19 RX ADMIN — CHLORHEXIDINE GLUCONATE 0.12% ORAL RINSE 15 ML: 1.2 LIQUID ORAL at 08:03

## 2020-03-19 RX ADMIN — NICARDIPINE HYDROCHLORIDE 15 MG/HR: 0.2 INJECTION, SOLUTION INTRAVENOUS at 09:03

## 2020-03-19 RX ADMIN — MINERAL OIL AND WHITE PETROLATUM: 150; 830 OINTMENT OPHTHALMIC at 08:03

## 2020-03-19 RX ADMIN — PROPOFOL 50 MCG/KG/MIN: 10 INJECTION, EMULSION INTRAVENOUS at 05:03

## 2020-03-19 RX ADMIN — Medication 450 MCG/HR: at 03:03

## 2020-03-19 RX ADMIN — INSULIN ASPART 5 UNITS: 100 INJECTION, SOLUTION INTRAVENOUS; SUBCUTANEOUS at 01:03

## 2020-03-19 NOTE — PLAN OF CARE
Pt prone until 0930-10am 3/19/20. Gas checked, rate changed to 24 per order. Peep of 12, fiO2 50%. TOF 4/4 @ 7 baseline before paralytic. Current TOF  0/4 @ 7. BIS in place. Saxena in place. OGT in place. t-max 101.1, prn meds given per order. Last temp 98.8. Foam c/d/i on shin and bony prominences. Cardene, insulin, nimbex, fentanyl, precedex and propofol infusing per order. CBG q4, set rate at 8 u/hr with sliding scale per order, DO NOT follow nomogram per md.           CMICU DAILY GOALS       A: Awake    RASS: Goal - RASS Goal: -5-->unarousable  Actual - RASS ([place] Agitation-Sedation Scale): -5-->unarousable   Restraint necessity: Clinical Justification: Treatment Interference, Removing medical devices  B: Breath   SBT: Not attempted   C: Coordinate A & B, analgesics/sedatives   Pain: managed    SAT: Not attempted  D: Delirium   CAM-ICU: Overall CAM-ICU: Positive  E: Early Mobility   MOVE Screen: Fail   Activity: Activity Management: bedrest maintained per order  FAS: Feeding/Nutrition   Diet order: Diet/Nutrition Received: NPO,   Fluid restriction:    T: Thrombus   DVT prophylaxis: VTE Required Core Measure: Pharmacological prophylaxis initiated/maintained  H: HOB Elevation   Head of Bed (HOB): HOB not elevated due to medical condition  U: Ulcer Prophylaxis   GI: yes  G: Glucose control   managed Glycemic Management: blood glucose monitoring  S: Skin   Bundle compliance: yes   Bathing/Skin Care: bath, chlorhexidine, bath, partial, dressed/undressed, incontinence care Date: [unfilled]  B: Bowel Function   no issues   I: Indwelling Catheters   Saxena necessity:      Urethral Catheter 03/17/20 1200-Reason for Continuing Urinary Catheterization: Critically ill in ICU requiring intensive monitoring   CVC necessity: Yes   IPAD offered: Not appropriate  D: De-escalation Antibx   Yes  Plan for the day   Decrease blood glucose, decrease BP, supinate per order. Monitor all vitals/vent settings and BIS/train of 4 per  order.  Family/Goals of care/Code Status   Code Status: Full Code     VS and assessment per flow sheet, patient progressing towards goals as tolerated, plan of care reviewed with Riri Gonsalves and family, all concerns addressed, will continue to monitor.

## 2020-03-19 NOTE — ASSESSMENT & PLAN NOTE
Pt transferred from [facility] on 3/17 for ARDS 2/2 COVID 19. Pt was intubated on 3/15 & is on precedex, propofol, and fentanyl.   On transfer: , ferretin 368, D-Dimer >33  Pt continues to be febrile at 101    Plan:  - supinated pt @ 1100 on 3/18--> f/u repeat ABG @ 3pm    - vent settings decreased to 50% FiO2 & 10 PEEP   - continue methylprednisolone 40mg q6  - continue fentanyl, propofol, and precedex- wean as tolerated  - Goal to wean patient off propofol; started Versed gtt today  - azithromycin & rocephin for emperic CAP coverage  - f/u daily CRP, downtrending 82.6 to 80.4  - Continuing plaquenil; waiting for approval of remdesevir

## 2020-03-19 NOTE — CONSULTS
ID consult order noted- Full note to follow    Chart reviewed:  Pt is 29yoF w/ PMH DM2, HTN, and benign intracranial HTN transferred to [facility] on 3/17 for possible ECMO  As per chart review and info from OSH pt had rhinorrhea & dry cough for several days- went to urgent care- was discharged home on amoxicillin. A few days later she was continuing to have fevers as high as 103 & was having worsening SOB at rest- went to [facility]  3/13 with SOB, fevers, and cough.   Was on 2L oxygen. Labs on presentation were CRP 18.2, WBC 3.7, flu negative. Pt was admitted to medicine & started on empiric CAP coverage. Pt's condition continued to deteriorate- worsening leukopenia, lymphopenia, elevated LDH, & CRP, & increasing oxygen requirements. CXR showed worsening multifocal airspace disease. Pt in ARDS. Pt was transferred to MICU on 3/14. Pt decompensated & was intubated on 3/15 & on 3/16 results came back positive for Covid 19. Echo showed normal LV function (EF 60%). Pt was also started on methylprednisolone. 3/17 transferred to [facility]. ID consulted for COVID 19 management.     As per notes; Pt requiring cardene drip for uncontrolled HTN. Team holding plaquenil waiting for approval of Remdesivir    Full note to follow    Floyd MACIEL Fellow

## 2020-03-19 NOTE — ASSESSMENT & PLAN NOTE
Home med includes lisinopril 20mg daily & lasix 40mg daily.     Plan:  - nicardipine drip, will ask pharmacy to concentrated cardine gtt  - Coreg 25mg BID  - Q6h hydralazine 100mg scheduled  - Patient net fluid positive 6L, schedule 40MG IV lasix Q6h

## 2020-03-19 NOTE — NURSING
Notified Dr Henderson that patient's triglyceride level resulted at 895. Beginning to wean propofol while awaiting other new orders. WCTM.

## 2020-03-19 NOTE — PROGRESS NOTES
[facility]  Critical Care Medicine  Progress Note    Patient Name: Riri Gonsalves  MRN: [id number]  Admission Date: 3/17/2020  Hospital Length of Stay: 2 days  Code Status: Full Code  Attending Provider: Favio Sebastian MD  Primary Care Provider: Jocelynn Brooks MD   Principal Problem: Acute respiratory failure    Subjective:     HPI:  Pt is 29yoF w/ PMH DM2, HTN, and benign intracranial HTN who presented to [facility] on 3/13 complaining of shortness of breath, fevers, and cough. 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 According to notes from OSH pt had rhinorrhea & dry cough for several days- went to urgent care where she was told she had flu-like symptoms & was discharged home on amoxicillin. A few days later she was continuing to have fevers as high as 103 & was having worsening SOB at rest- went to [facility] ED & was initially feeling better on 2L NC. Labs on presentation were CRP 18.2, WBC 3.7, flu negative. Pt was admitted to medicine & started on empiric CAP coverage. Pt's condition continued to deteriorate- worsening leukopenia, lymphopenia, elevated LDH, & CRP, & increasing oxygen requirements. CXR showed worsening multifocal airspace disease. Pt was transferred to MICU on 3/14. Pt decompensated &was intubated on 3/15 & on 3/16 results came back positive for Covid 19. Echo showed normal LV function (EF 60%)Pt required fentanyl, precedex, and propofol. Pt also started on methylprednisolone. Pt was proned on 3/16. Pt was supinated prior to transfer.   Pt presents as transfer from [facility] ICU for possible ecmo.     Hospital/ICU Course:  Pt transferred from [facility] ICU on 3/17. Pt was pronated at 1745 on 3/17. Started cardine drip for hypertension & insulin drip for hyperglycemia. Pt supinated at 1100 on 3/18. Pt remains on precedex, fentanyl, and propofol.     Patient with hypertriglyceridemia, will wean propofol. Patient will start on Versed gtt. Will titrate down propofol.    Interval  History/Significant Events: No acute events overnight. Still remains on fentanyl and propofol. Cardene gtt, however, patient started on coreg 25mg BID and scheduled [id number] q8h.     Levo d/c'D 9AM.    Pt also hyperglycemic- on insulin gtt to be transitioned to subQ as patient has no AG.     Review of Systems   Unable to perform ROS: Intubated     Objective:     Vital Signs (Most Recent):  Temp: (!) 100.6 °F (38.1 °C) (03/19/20 1200)  Pulse: 87 (03/19/20 1200)  Resp: (!) 24 (03/19/20 1200)  BP: (!) 165/74 (03/19/20 0814)  SpO2: 96 % (03/19/20 1200) Vital Signs (24h Range):  Temp:  [98.8 °F (37.1 °C)-102 °F (38.9 °C)] 100.6 °F (38.1 °C)  Pulse:  [] 87  Resp:  [22-27] 24  SpO2:  [88 %-98 %] 96 %  BP: (108-180)/(72-79) 165/74  Arterial Line BP: (151-196)/(58-89) 166/78   Weight: (!) 149.6 kg (329 lb 12.9 oz)  Body mass index is 46 kg/m².      Intake/Output Summary (Last 24 hours) at 3/19/2020 1242  Last data filed at 3/19/2020 1200  Gross per 24 hour   Intake 5010.88 ml   Output 2585 ml   Net 2425.88 ml       Physical Exam   Constitutional:   Refer to attestation       Vents:  Vent Mode: A/C (03/19/20 0740)  Ventilator Initiated: Yes (03/17/20 1619)  Set Rate: 24 BPM (03/19/20 0740)  Vt Set: 380 mL (03/19/20 0740)  Pressure Support: 3 cmH20 (03/17/20 1700)  PEEP/CPAP: 12 cmH20 (03/19/20 0740)  Oxygen Concentration (%): 50 (03/19/20 1200)  Peak Airway Pressure: 32 cmH2O (03/19/20 0740)  Plateau Pressure: 22 cmH20 (03/18/20 0616)  Total Ve: 9.34 mL (03/19/20 0740)  F/VT Ratio<105 (RSBI): (!) 61.7 (03/19/20 0740)  Lines/Drains/Airways     Drain                 NG/OG Tube 03/17/20  Right mouth 2 days         Urethral Catheter 03/17/20 1200 2 days          Airway                 Airway - Non-Surgical -- days          Arterial Line                 Arterial Line 03/18/20 1344 Right Radial less than 1 day          Peripheral Intravenous Line                 Peripheral IV - Single Lumen 03/17/20   Anterior;Distal;Right Forearm 2 days         Peripheral IV - Single Lumen 03/17/20 Anterior;Proximal;Right Forearm 2 days              Significant Labs:    CBC/Anemia Profile:  Recent Labs   Lab 03/17/20  1558 03/18/20  0300 03/19/20  0300   WBC 5.96 6.72 4.87   HGB 11.7* 10.9* 10.3*   HCT 40.0 37.6 35.6*    274 307   MCV 88 87 88   RDW 14.1 14.3 14.0   FERRITIN 368*  --   --         Chemistries:  Recent Labs   Lab 03/17/20  1558 03/18/20  0300 03/19/20  0300    146* 146*   K 4.2 4.8 4.4    106 108   CO2 16* 25 29   BUN 10 16 11   CREATININE 0.8 1.1 0.8   CALCIUM 8.5* 9.2 8.2*   ALBUMIN 1.9* 2.0* 2.0*   PROT 7.0 7.2 6.7   BILITOT 0.3 0.3 0.2   ALKPHOS 70 72 59   ALT 17 16 14   AST 25 22 13   MG 2.2 2.1 2.3   PHOS 2.9 3.3 2.3*             ABG  Recent Labs   Lab 03/19/20  0310   PH 7.385   PO2 66*   PCO2 57.4*   HCO3 34.3*   BE 9     Assessment/Plan:     Pulmonary  * Acute respiratory failure  Pt transferred from [facility] on 3/17 for ARDS 2/2 COVID 19. Pt was intubated on 3/15 & is on precedex, propofol, and fentanyl.   On transfer: , ferretin 368, D-Dimer >33  Pt continues to be febrile at 101    Plan:  - supinated pt @ 1100 on 3/18--> f/u repeat ABG @ 3pm    - vent settings decreased to 50% FiO2 & 10 PEEP   - continue methylprednisolone 40mg q6  - continue fentanyl, propofol, and precedex- wean as tolerated  - Goal to wean patient off propofol; started Versed gtt today  - azithromycin & rocephin for emperic CAP coverage  - f/u daily CRP, downtrending 82.6 to 80.4  - Continuing plaquenil; waiting for approval of remdesevir    Cardiac/Vascular  Essential hypertension  Home med includes lisinopril 20mg daily & lasix 40mg daily.     Plan:  - nicardipine drip  - Coreg 25mg BID  - Q6h hydralazine 100mg scheduled    Endocrine  Type 2 diabetes mellitus without complication, without long-term current use of insulin  Pt has A1c 12.4 (3/17/20) & home med is metformin 500mg BID.     Plan:  - LDSSI   -  insulin drip at 6U  - accuchecks Q4  - Will transition patient off insulin gtt once BGL <250,   Total insulin in 24h period 164U   Will schedule 30U Detemir BID  MDS             Critical Care Daily Checklist:     A: Awake: RASS Goal/Actual Goal: RASS Goal: -4-->deep sedation  Actual: [place] Agitation Sedation Scale (RASS): Deep sedation   B: Spontaneous Breathing Trial Performed?    C: SAT & SBT Coordinated?  N/A                      D: Delirium: CAM-ICU Overall CAM-ICU: Positive   E: Early Mobility Performed? No   F: Feeding Goal: Goals: Meet % EEN, EPN by RD f/u date  Status: Nutrition Goal Status: new       Current Diet Order   Procedures    Diet NPO       AS: Analgesia/Sedation Propofol, fentanyl, precedex   T: Thromboembolic Prophylaxis lovenoz   H: HOB > 300 Yes   U: Stress Ulcer Prophylaxis (if needed) protonix   G: Glucose Control Insulin drip   B: Bowel Function Stool Occurrence: 0   I: Indwelling Catheter (Lines & Saxena) Necessity Saxena, a line, 2 PIV, ET tube   D: De-escalation of Antimicrobials/Pharmacotherapies       Plan for the day/ETD - Transition off of insulin gtt  - Wean patient down off propofol for HTG     Code Status:  Family/Goals of Care: Full Code            Critical secondary to Patient has a condition that poses threat to life and bodily function: acute respiratory failure 2/2 COVID-19      Critical care was time spent personally by me on the following activities: development of treatment plan with patient or surrogate and bedside caregivers, discussions with consultants, evaluation of patient's response to treatment, examination of patient, ordering and performing treatments and interventions, ordering and review of laboratory studies, ordering and review of radiographic studies, pulse oximetry, re-evaluation of patient's condition. This critical care time did not overlap with that of any other provider or involve time for any procedures.     Rosalia Pride MD  Critical Care  Medicine  [facility]

## 2020-03-19 NOTE — EICU
Rounding (Video Assessment):  Yes--Continues on vent support (AC 22, , PEEP 12, FiO2 50%), sedated, on nimbex, presently in prone position    Intervention Initiated From:  COR / EICU

## 2020-03-19 NOTE — ASSESSMENT & PLAN NOTE
Home med includes lisinopril 20mg daily & lasix 40mg daily.     Plan:  - nicardipine drip  - Coreg 25mg BID  - Q6h hydralazine 100mg scheduled

## 2020-03-19 NOTE — EICU
Rounding (Video Assessment):  Yes    Intervention Initiated From:  COR / EICU    Mitzi Communicated with Bedside Nurse regarding:  Other    Nurse Notified:  No    Doctor Notified:  No    Comments: video rounds complete. Pt is prone. All drips noted on mar. No distress noted.

## 2020-03-19 NOTE — ASSESSMENT & PLAN NOTE
Pt has A1c 12.4 (3/17/20) & home med is metformin 500mg BID.     Plan:  - LDSSI   - insulin drip at 6U  - accuchecks Q4  - Will transition patient off insulin gtt once BGL <250,   Total insulin in 24h period 164U   Will schedule 30U Detemir BID  MDSSI

## 2020-03-19 NOTE — ASSESSMENT & PLAN NOTE
Pt has A1c 12.4 (3/17/20) & home med is metformin 500mg BID.     Plan:  - insulin drip at 3U +MDSSI  - accuchecks Q4  - Will transition patient off insulin gtt once BGL <250,   - Total insulin in 24h period 164U wean off insult gtt  - schedule 30U Detemir BID and MDSSI

## 2020-03-19 NOTE — SUBJECTIVE & OBJECTIVE
Interval History/Significant Events: No acute events overnight. Still remains on fentanyl and propofol. Continue to be hypertensive around SBP of high 150's. Continuing Cardene gtt, patient started on coreg 25mg BID and scheduled hydralazine 100mg q8h.   Pt continue to be hyperglycemic- on insulin gtt to be transitioned to subQ.     Review of Systems   Unable to perform ROS: Intubated     Objective:     Vital Signs (Most Recent):  Temp: 99.9 °F (37.7 °C) (03/20/20 1100)  Pulse: 87 (03/20/20 1100)  Resp: (!) 24 (03/20/20 1100)  BP: (!) 146/53 (03/19/20 1432)  SpO2: 96 % (03/20/20 1100) Vital Signs (24h Range):  Temp:  [99.4 °F (37.4 °C)-100.8 °F (38.2 °C)] 99.9 °F (37.7 °C)  Pulse:  [87-98] 87  Resp:  [24-28] 24  SpO2:  [87 %-96 %] 96 %  BP: (146)/(53) 146/53  Arterial Line BP: (145-166)/(64-79) 151/70   Weight: (!) 149.6 kg (329 lb 12.9 oz)  Body mass index is 46 kg/m².      Intake/Output Summary (Last 24 hours) at 3/20/2020 1138  Last data filed at 3/20/2020 1102  Gross per 24 hour   Intake 5517.69 ml   Output 1135 ml   Net 4382.69 ml       Physical Exam   Constitutional: She appears well-developed.   Refer to staff attestation due to COVID 19 status    Pulmonary/Chest:   intubated   Neurological:   sedated   Nursing note and vitals reviewed.      Vents:  Vent Mode: A/C (03/20/20 0903)  Ventilator Initiated: Yes (03/17/20 1619)  Set Rate: 24 BPM (03/20/20 0903)  Vt Set: 380 mL (03/20/20 0903)  Pressure Support: 3 cmH20 (03/17/20 1700)  PEEP/CPAP: 10 cmH20 (03/20/20 0903)  Oxygen Concentration (%): 50 (03/20/20 0903)  Peak Airway Pressure: 33 cmH2O (03/20/20 0903)  Plateau Pressure: 28 cmH20 (03/20/20 0325)  Total Ve: 9.28 mL (03/20/20 0903)  F/VT Ratio<105 (RSBI): (!) 65.75 (03/20/20 0903)  Lines/Drains/Airways     Central Venous Catheter Line            Percutaneous Central Line Insertion/Assessment - Triple Lumen  03/17/20 1100 right internal jugular 3 days          Drain                 NG/OG Tube 03/17/20   Right mouth 3 days         Urethral Catheter 03/17/20 1200 2 days          Airway                 Airway - Non-Surgical -- days          Arterial Line                 Arterial Line 03/18/20 1344 Right Radial 1 day          Peripheral Intravenous Line                 Peripheral IV - Single Lumen 03/17/20  Anterior;Distal;Right Forearm 3 days         Peripheral IV - Single Lumen 03/17/20 Anterior;Proximal;Right Forearm 3 days              Significant Labs:    CBC/Anemia Profile:  Recent Labs   Lab 03/19/20  0300 03/20/20  0300   WBC 4.87 4.57   HGB 10.3* 10.4*   HCT 35.6* 36.0*    341   MCV 88 88   RDW 14.0 14.0        Chemistries:  Recent Labs   Lab 03/19/20  0300 03/20/20  0300   * 146*   K 4.4 4.4    109   CO2 29 26   BUN 11 15   CREATININE 0.8 0.7   CALCIUM 8.2* 7.8*   ALBUMIN 2.0* 2.0*   PROT 6.7 6.3   BILITOT 0.2 0.3   ALKPHOS 59 58   ALT 14 13   AST 13 18   MG 2.3 2.1   PHOS 2.3* 3.3

## 2020-03-19 NOTE — PLAN OF CARE
CMICU DAILY GOALS       A: Awake    RASS: Goal - RASS Goal: -5-->unarousable  Actual - RASS ([place] Agitation-Sedation Scale): -5-->unarousable   Restraint necessity: Clinical Justification: Treatment Interference, Removing medical devices  B: Breath   SBT: Not attempted Pt paralyzed and sedated  C: Coordinate A & B, analgesics/sedatives   Pain: managed continuous Fentanyl, Versed, and Precedex infusions.    SAT: Not attempted. Pt paralyzed/sedated.   D: Delirium   CAM-ICU: Overall CAM-ICU: Positive  E: Early Mobility   MOVE Screen: Fail. ARDS protocol proning and supinating.    Activity: Activity Management: bedrest maintained per order  FAS: Feeding/Nutrition   Diet order: Diet/Nutrition Received: NPO,   Fluid restriction:  none  T: Thrombus   DVT prophylaxis: VTE Required Core Measure: Pharmacological prophylaxis initiated/maintained  H: HOB Elevation   Head of Bed (HOB): HOB flat  U: Ulcer Prophylaxis   GI: yes. Protonix IV daily.   G: Glucose control   uncontrolled Glycemic Management: blood glucose monitoring. Insulin gtt infusing and sliding scale being given PRN per MAR  S: Skin   Bundle compliance: yes. Pressure points protected, wires/lines removed from underneath patient.   Bathing/Skin Care: bath, chlorhexidine, bath, partial, dressed/undressed, incontinence care Date: 3/19/20 Partial bath.   B: Bowel Function   no issues BM yesterday AM.   I: Indwelling Catheters   Saxena necessity:      Urethral Catheter 03/17/20 1200-Reason for Continuing Urinary Catheterization: Critically ill in ICU requiring intensive monitoring   CVC necessity: Yes   IPAD offered: Not appropriate. Pt paralyzed/sedated  D: De-escalation Antibx   No  Plan for the day   Pt supinated at 1420 today. ABG to be drawn at 1820 to see if pt progressing. Pt remains paralyzed and sedated/ARDS Protocol. Vent: Fio2 50%, PEEP 12,  Rate 24. Pt synchronous wit the ventilator and in no distress/agitation. BIS between 34-43 this shift.  Propofol stopped this shift due to elevated trigylcerides. Monitoring vital signs, I&O's, and Respiratory Status very closely. Attempting to wean Cardene as tolerated to maintain Systolic 160 and less. PO Hydralazine started today per Dr Sebastian  as part of daily regimen. T-Max 100.8-Tylenol given per MAR.   Family/Goals of care/Code Status   Code Status: Full Code     VS and assessment per flow sheet, patient progressing towards goals as tolerated, plan of care reviewed with Riri Gonsalves and family, all concerns addressed, will continue to monitor.

## 2020-03-19 NOTE — SUBJECTIVE & OBJECTIVE
Interval History/Significant Events: No acute events overnight. Still remains on fentanyl and propofol. Cardene gtt, however, patient started on coreg 25mg BID and scheduled [id number] q8h.     Levo d/c'D 9AM.    Pt also hyperglycemic- on insulin gtt to be transitioned to subQ as patient has no AG.     Review of Systems   Unable to perform ROS: Intubated     Objective:     Vital Signs (Most Recent):  Temp: (!) 100.6 °F (38.1 °C) (03/19/20 1200)  Pulse: 87 (03/19/20 1200)  Resp: (!) 24 (03/19/20 1200)  BP: (!) 165/74 (03/19/20 0814)  SpO2: 96 % (03/19/20 1200) Vital Signs (24h Range):  Temp:  [98.8 °F (37.1 °C)-102 °F (38.9 °C)] 100.6 °F (38.1 °C)  Pulse:  [] 87  Resp:  [22-27] 24  SpO2:  [88 %-98 %] 96 %  BP: (108-180)/(72-79) 165/74  Arterial Line BP: (151-196)/(58-89) 166/78   Weight: (!) 149.6 kg (329 lb 12.9 oz)  Body mass index is 46 kg/m².      Intake/Output Summary (Last 24 hours) at 3/19/2020 1242  Last data filed at 3/19/2020 1200  Gross per 24 hour   Intake 5010.88 ml   Output 2585 ml   Net 2425.88 ml       Physical Exam   Constitutional:   Refer to attestation       Vents:  Vent Mode: A/C (03/19/20 0740)  Ventilator Initiated: Yes (03/17/20 1619)  Set Rate: 24 BPM (03/19/20 0740)  Vt Set: 380 mL (03/19/20 0740)  Pressure Support: 3 cmH20 (03/17/20 1700)  PEEP/CPAP: 12 cmH20 (03/19/20 0740)  Oxygen Concentration (%): 50 (03/19/20 1200)  Peak Airway Pressure: 32 cmH2O (03/19/20 0740)  Plateau Pressure: 22 cmH20 (03/18/20 0616)  Total Ve: 9.34 mL (03/19/20 0740)  F/VT Ratio<105 (RSBI): (!) 61.7 (03/19/20 0740)  Lines/Drains/Airways     Drain                 NG/OG Tube 03/17/20  Right mouth 2 days         Urethral Catheter 03/17/20 1200 2 days          Airway                 Airway - Non-Surgical -- days          Arterial Line                 Arterial Line 03/18/20 1344 Right Radial less than 1 day          Peripheral Intravenous Line                 Peripheral IV - Single Lumen 03/17/20   Anterior;Distal;Right Forearm 2 days         Peripheral IV - Single Lumen 03/17/20 Anterior;Proximal;Right Forearm 2 days              Significant Labs:    CBC/Anemia Profile:  Recent Labs   Lab 03/17/20  1558 03/18/20  0300 03/19/20  0300   WBC 5.96 6.72 4.87   HGB 11.7* 10.9* 10.3*   HCT 40.0 37.6 35.6*    274 307   MCV 88 87 88   RDW 14.1 14.3 14.0   FERRITIN 368*  --   --         Chemistries:  Recent Labs   Lab 03/17/20  1558 03/18/20  0300 03/19/20  0300    146* 146*   K 4.2 4.8 4.4    106 108   CO2 16* 25 29   BUN 10 16 11   CREATININE 0.8 1.1 0.8   CALCIUM 8.5* 9.2 8.2*   ALBUMIN 1.9* 2.0* 2.0*   PROT 7.0 7.2 6.7   BILITOT 0.3 0.3 0.2   ALKPHOS 70 72 59   ALT 17 16 14   AST 25 22 13   MG 2.2 2.1 2.3   PHOS 2.9 3.3 2.3*

## 2020-03-20 LAB
ALBUMIN SERPL BCP-MCNC: 2 G/DL (ref 3.5–5.2)
ALLENS TEST: ABNORMAL
ALLENS TEST: ABNORMAL
ALP SERPL-CCNC: 58 U/L (ref 55–135)
ALT SERPL W/O P-5'-P-CCNC: 13 U/L (ref 10–44)
ANION GAP SERPL CALC-SCNC: 11 MMOL/L (ref 8–16)
AST SERPL-CCNC: 18 U/L (ref 10–40)
BASOPHILS # BLD AUTO: 0.01 K/UL (ref 0–0.2)
BASOPHILS NFR BLD: 0.2 % (ref 0–1.9)
BILIRUB SERPL-MCNC: 0.3 MG/DL (ref 0.1–1)
BUN SERPL-MCNC: 15 MG/DL (ref 6–20)
CALCIUM SERPL-MCNC: 7.8 MG/DL (ref 8.7–10.5)
CHLORIDE SERPL-SCNC: 109 MMOL/L (ref 95–110)
CO2 SERPL-SCNC: 26 MMOL/L (ref 23–29)
CREAT SERPL-MCNC: 0.7 MG/DL (ref 0.5–1.4)
CRP SERPL-MCNC: 55 MG/L (ref 0–8.2)
DELSYS: ABNORMAL
DIFFERENTIAL METHOD: ABNORMAL
EOSINOPHIL # BLD AUTO: 0 K/UL (ref 0–0.5)
EOSINOPHIL NFR BLD: 0 % (ref 0–8)
ERYTHROCYTE [DISTWIDTH] IN BLOOD BY AUTOMATED COUNT: 14 % (ref 11.5–14.5)
ERYTHROCYTE [SEDIMENTATION RATE] IN BLOOD BY WESTERGREN METHOD: 24 MM/H
EST. GFR  (AFRICAN AMERICAN): >60 ML/MIN/1.73 M^2
EST. GFR  (NON AFRICAN AMERICAN): >60 ML/MIN/1.73 M^2
FIO2: 40
GLUCOSE SERPL-MCNC: 305 MG/DL (ref 70–110)
HCO3 UR-SCNC: 25.9 MMOL/L (ref 24–28)
HCO3 UR-SCNC: 33.8 MMOL/L (ref 24–28)
HCT VFR BLD AUTO: 36 % (ref 37–48.5)
HGB BLD-MCNC: 10.4 G/DL (ref 12–16)
IMM GRANULOCYTES # BLD AUTO: 0.12 K/UL (ref 0–0.04)
IMM GRANULOCYTES NFR BLD AUTO: 2.6 % (ref 0–0.5)
LYMPHOCYTES # BLD AUTO: 0.8 K/UL (ref 1–4.8)
LYMPHOCYTES NFR BLD: 17.1 % (ref 18–48)
MAGNESIUM SERPL-MCNC: 2.1 MG/DL (ref 1.6–2.6)
MCH RBC QN AUTO: 25.5 PG (ref 27–31)
MCHC RBC AUTO-ENTMCNC: 28.9 G/DL (ref 32–36)
MCV RBC AUTO: 88 FL (ref 82–98)
MODE: ABNORMAL
MONOCYTES # BLD AUTO: 0.4 K/UL (ref 0.3–1)
MONOCYTES NFR BLD: 8.8 % (ref 4–15)
NEUTROPHILS # BLD AUTO: 3.3 K/UL (ref 1.8–7.7)
NEUTROPHILS NFR BLD: 71.3 % (ref 38–73)
NRBC BLD-RTO: 0 /100 WBC
PCO2 BLDA: 40.5 MMHG (ref 35–45)
PCO2 BLDA: 60.8 MMHG (ref 35–45)
PEEP: 10
PH SMN: 7.35 [PH] (ref 7.35–7.45)
PH SMN: 7.41 [PH] (ref 7.35–7.45)
PHOSPHATE SERPL-MCNC: 3.3 MG/DL (ref 2.7–4.5)
PLATELET # BLD AUTO: 341 K/UL (ref 150–350)
PMV BLD AUTO: 10.1 FL (ref 9.2–12.9)
PO2 BLDA: 61 MMHG (ref 80–100)
PO2 BLDA: 73 MMHG (ref 80–100)
POC BE: 1 MMOL/L
POC BE: 8 MMOL/L
POC SATURATED O2: 89 % (ref 95–100)
POC SATURATED O2: 95 % (ref 95–100)
POC TCO2: 27 MMOL/L (ref 23–27)
POC TCO2: 36 MMOL/L (ref 23–27)
POCT GLUCOSE: 203 MG/DL (ref 70–110)
POCT GLUCOSE: 268 MG/DL (ref 70–110)
POCT GLUCOSE: 276 MG/DL (ref 70–110)
POCT GLUCOSE: 317 MG/DL (ref 70–110)
POCT GLUCOSE: 327 MG/DL (ref 70–110)
POCT GLUCOSE: 353 MG/DL (ref 70–110)
POTASSIUM SERPL-SCNC: 4.4 MMOL/L (ref 3.5–5.1)
PROT SERPL-MCNC: 6.3 G/DL (ref 6–8.4)
PROVIDER CREDENTIALS: ABNORMAL
PROVIDER NOTIFIED: ABNORMAL
RBC # BLD AUTO: 4.08 M/UL (ref 4–5.4)
SAMPLE: ABNORMAL
SAMPLE: ABNORMAL
SITE: ABNORMAL
SITE: ABNORMAL
SODIUM SERPL-SCNC: 146 MMOL/L (ref 136–145)
VT: 380
WBC # BLD AUTO: 4.57 K/UL (ref 3.9–12.7)

## 2020-03-20 PROCEDURE — 94770 HC EXHALED C02 TEST: CPT

## 2020-03-20 PROCEDURE — 94761 N-INVAS EAR/PLS OXIMETRY MLT: CPT

## 2020-03-20 PROCEDURE — 63600175 PHARM REV CODE 636 W HCPCS: Performed by: STUDENT IN AN ORGANIZED HEALTH CARE EDUCATION/TRAINING PROGRAM

## 2020-03-20 PROCEDURE — 99233 SBSQ HOSP IP/OBS HIGH 50: CPT | Mod: ,,, | Performed by: INTERNAL MEDICINE

## 2020-03-20 PROCEDURE — 84100 ASSAY OF PHOSPHORUS: CPT

## 2020-03-20 PROCEDURE — 83735 ASSAY OF MAGNESIUM: CPT

## 2020-03-20 PROCEDURE — 80053 COMPREHEN METABOLIC PANEL: CPT

## 2020-03-20 PROCEDURE — 99900026 HC AIRWAY MAINTENANCE (STAT)

## 2020-03-20 PROCEDURE — 25000003 PHARM REV CODE 250: Performed by: STUDENT IN AN ORGANIZED HEALTH CARE EDUCATION/TRAINING PROGRAM

## 2020-03-20 PROCEDURE — 99291 CRITICAL CARE FIRST HOUR: CPT | Mod: ,,, | Performed by: INTERNAL MEDICINE

## 2020-03-20 PROCEDURE — 99233 PR SUBSEQUENT HOSPITAL CARE,LEVL III: ICD-10-PCS | Mod: ,,, | Performed by: INTERNAL MEDICINE

## 2020-03-20 PROCEDURE — 87070 CULTURE OTHR SPECIMN AEROBIC: CPT

## 2020-03-20 PROCEDURE — 37799 UNLISTED PX VASCULAR SURGERY: CPT

## 2020-03-20 PROCEDURE — 99900035 HC TECH TIME PER 15 MIN (STAT)

## 2020-03-20 PROCEDURE — C9113 INJ PANTOPRAZOLE SODIUM, VIA: HCPCS | Performed by: STUDENT IN AN ORGANIZED HEALTH CARE EDUCATION/TRAINING PROGRAM

## 2020-03-20 PROCEDURE — 63600175 PHARM REV CODE 636 W HCPCS: Performed by: INTERNAL MEDICINE

## 2020-03-20 PROCEDURE — 94640 AIRWAY INHALATION TREATMENT: CPT

## 2020-03-20 PROCEDURE — S0028 INJECTION, FAMOTIDINE, 20 MG: HCPCS | Performed by: STUDENT IN AN ORGANIZED HEALTH CARE EDUCATION/TRAINING PROGRAM

## 2020-03-20 PROCEDURE — 25000242 PHARM REV CODE 250 ALT 637 W/ HCPCS: Performed by: STUDENT IN AN ORGANIZED HEALTH CARE EDUCATION/TRAINING PROGRAM

## 2020-03-20 PROCEDURE — 25000003 PHARM REV CODE 250: Performed by: INTERNAL MEDICINE

## 2020-03-20 PROCEDURE — 82803 BLOOD GASES ANY COMBINATION: CPT

## 2020-03-20 PROCEDURE — 87205 SMEAR GRAM STAIN: CPT

## 2020-03-20 PROCEDURE — 99291 PR CRITICAL CARE, E/M 30-74 MINUTES: ICD-10-PCS | Mod: ,,, | Performed by: INTERNAL MEDICINE

## 2020-03-20 PROCEDURE — 94003 VENT MGMT INPAT SUBQ DAY: CPT

## 2020-03-20 PROCEDURE — C9399 UNCLASSIFIED DRUGS OR BIOLOG: HCPCS | Performed by: STUDENT IN AN ORGANIZED HEALTH CARE EDUCATION/TRAINING PROGRAM

## 2020-03-20 PROCEDURE — 85025 COMPLETE CBC W/AUTO DIFF WBC: CPT

## 2020-03-20 PROCEDURE — 27000221 HC OXYGEN, UP TO 24 HOURS

## 2020-03-20 PROCEDURE — 20000000 HC ICU ROOM

## 2020-03-20 PROCEDURE — 86140 C-REACTIVE PROTEIN: CPT

## 2020-03-20 RX ORDER — PROPOFOL 10 MG/ML
5 INJECTION, EMULSION INTRAVENOUS CONTINUOUS
Status: DISCONTINUED | OUTPATIENT
Start: 2020-03-20 | End: 2020-03-20

## 2020-03-20 RX ORDER — FAMOTIDINE 10 MG/ML
20 INJECTION INTRAVENOUS 2 TIMES DAILY
Status: DISCONTINUED | OUTPATIENT
Start: 2020-03-20 | End: 2020-03-23

## 2020-03-20 RX ORDER — FUROSEMIDE 10 MG/ML
INJECTION INTRAMUSCULAR; INTRAVENOUS
Status: COMPLETED
Start: 2020-03-20 | End: 2020-03-20

## 2020-03-20 RX ORDER — FUROSEMIDE 10 MG/ML
40 INJECTION INTRAMUSCULAR; INTRAVENOUS ONCE
Status: DISCONTINUED | OUTPATIENT
Start: 2020-03-20 | End: 2020-03-20

## 2020-03-20 RX ORDER — FUROSEMIDE 10 MG/ML
40 INJECTION INTRAMUSCULAR; INTRAVENOUS ONCE
Status: COMPLETED | OUTPATIENT
Start: 2020-03-20 | End: 2020-03-20

## 2020-03-20 RX ORDER — FENTANYL CITRATE-0.9 % NACL/PF 10 MCG/ML
25 PLASTIC BAG, INJECTION (ML) INTRAVENOUS CONTINUOUS
Status: DISCONTINUED | OUTPATIENT
Start: 2020-03-20 | End: 2020-03-24

## 2020-03-20 RX ORDER — SODIUM CHLORIDE FOR INHALATION 3 %
4 VIAL, NEBULIZER (ML) INHALATION EVERY 6 HOURS
Status: DISCONTINUED | OUTPATIENT
Start: 2020-03-20 | End: 2020-03-24

## 2020-03-20 RX ORDER — FUROSEMIDE 10 MG/ML
40 INJECTION INTRAMUSCULAR; INTRAVENOUS EVERY 6 HOURS
Status: DISCONTINUED | OUTPATIENT
Start: 2020-03-20 | End: 2020-03-20

## 2020-03-20 RX ORDER — FUROSEMIDE 10 MG/ML
40 INJECTION INTRAMUSCULAR; INTRAVENOUS EVERY 6 HOURS
Status: DISCONTINUED | OUTPATIENT
Start: 2020-03-20 | End: 2020-03-23

## 2020-03-20 RX ORDER — DEXMEDETOMIDINE HYDROCHLORIDE 4 UG/ML
0.2 INJECTION, SOLUTION INTRAVENOUS CONTINUOUS
Status: DISCONTINUED | OUTPATIENT
Start: 2020-03-20 | End: 2020-03-26

## 2020-03-20 RX ADMIN — DEXMEDETOMIDINE HYDROCHLORIDE 1.4 MCG/KG/HR: 100 INJECTION, SOLUTION, CONCENTRATE INTRAVENOUS at 10:03

## 2020-03-20 RX ADMIN — AZITHROMYCIN DIHYDRATE 500 MG: 500 INJECTION, POWDER, LYOPHILIZED, FOR SOLUTION INTRAVENOUS at 08:03

## 2020-03-20 RX ADMIN — DEXMEDETOMIDINE HYDROCHLORIDE 1.4 MCG/KG/HR: 100 INJECTION, SOLUTION, CONCENTRATE INTRAVENOUS at 03:03

## 2020-03-20 RX ADMIN — NICARDIPINE HYDROCHLORIDE 15 MG/HR: 0.2 INJECTION, SOLUTION INTRAVENOUS at 03:03

## 2020-03-20 RX ADMIN — DEXMEDETOMIDINE HYDROCHLORIDE 1.4 MCG/KG/HR: 100 INJECTION, SOLUTION, CONCENTRATE INTRAVENOUS at 05:03

## 2020-03-20 RX ADMIN — FUROSEMIDE 40 MG: 10 INJECTION, SOLUTION INTRAVENOUS at 05:03

## 2020-03-20 RX ADMIN — Medication 450 MCG/HR: at 08:03

## 2020-03-20 RX ADMIN — CARVEDILOL 25 MG: 25 TABLET, FILM COATED ORAL at 08:03

## 2020-03-20 RX ADMIN — CHLORHEXIDINE GLUCONATE 0.12% ORAL RINSE 15 ML: 1.2 LIQUID ORAL at 08:03

## 2020-03-20 RX ADMIN — INSULIN ASPART 6 UNITS: 100 INJECTION, SOLUTION INTRAVENOUS; SUBCUTANEOUS at 09:03

## 2020-03-20 RX ADMIN — FAMOTIDINE 20 MG: 10 INJECTION INTRAVENOUS at 08:03

## 2020-03-20 RX ADMIN — NICARDIPINE HYDROCHLORIDE 10 MG/HR: 0.2 INJECTION, SOLUTION INTRAVENOUS at 06:03

## 2020-03-20 RX ADMIN — MIDAZOLAM HYDROCHLORIDE 3 MG/HR: 5 INJECTION, SOLUTION INTRAMUSCULAR; INTRAVENOUS at 08:03

## 2020-03-20 RX ADMIN — INSULIN DETEMIR 22 UNITS: 100 INJECTION, SOLUTION SUBCUTANEOUS at 09:03

## 2020-03-20 RX ADMIN — DEXMEDETOMIDINE HYDROCHLORIDE 1.4 MCG/KG/HR: 100 INJECTION, SOLUTION, CONCENTRATE INTRAVENOUS at 01:03

## 2020-03-20 RX ADMIN — SODIUM CHLORIDE 5 UNITS/HR: 9 INJECTION, SOLUTION INTRAVENOUS at 02:03

## 2020-03-20 RX ADMIN — HYDRALAZINE HYDROCHLORIDE 100 MG: 50 TABLET ORAL at 05:03

## 2020-03-20 RX ADMIN — HYDRALAZINE HYDROCHLORIDE 100 MG: 50 TABLET ORAL at 08:03

## 2020-03-20 RX ADMIN — MINERAL OIL AND WHITE PETROLATUM: 150; 830 OINTMENT OPHTHALMIC at 09:03

## 2020-03-20 RX ADMIN — NICARDIPINE HYDROCHLORIDE 7.5 MG/HR: 0.2 INJECTION, SOLUTION INTRAVENOUS at 10:03

## 2020-03-20 RX ADMIN — METHYLPREDNISOLONE SODIUM SUCCINATE 40 MG: 40 INJECTION, POWDER, FOR SOLUTION INTRAMUSCULAR; INTRAVENOUS at 08:03

## 2020-03-20 RX ADMIN — HYDRALAZINE HYDROCHLORIDE 100 MG: 50 TABLET ORAL at 01:03

## 2020-03-20 RX ADMIN — INSULIN ASPART 10 UNITS: 100 INJECTION, SOLUTION INTRAVENOUS; SUBCUTANEOUS at 05:03

## 2020-03-20 RX ADMIN — PANTOPRAZOLE SODIUM 40 MG: 40 INJECTION, POWDER, FOR SOLUTION INTRAVENOUS at 08:03

## 2020-03-20 RX ADMIN — CISATRACURIUM BESYLATE 1.5 MCG/KG/MIN: 10 INJECTION INTRAVENOUS at 03:03

## 2020-03-20 RX ADMIN — ENOXAPARIN SODIUM 40 MG: 100 INJECTION SUBCUTANEOUS at 08:03

## 2020-03-20 RX ADMIN — Medication 450 MCG/HR: at 01:03

## 2020-03-20 RX ADMIN — FUROSEMIDE 40 MG: 10 INJECTION, SOLUTION INTRAVENOUS at 08:03

## 2020-03-20 RX ADMIN — DEXMEDETOMIDINE HYDROCHLORIDE 1.4 MCG/KG/HR: 100 INJECTION, SOLUTION, CONCENTRATE INTRAVENOUS at 06:03

## 2020-03-20 RX ADMIN — SODIUM CHLORIDE 30 MG/ML INHALATION SOLUTION 4 ML: 30 SOLUTION INHALANT at 01:03

## 2020-03-20 RX ADMIN — DEXMEDETOMIDINE HYDROCHLORIDE 1.4 MCG/KG/HR: 100 INJECTION, SOLUTION, CONCENTRATE INTRAVENOUS at 08:03

## 2020-03-20 RX ADMIN — FAMOTIDINE 20 MG: 10 INJECTION INTRAVENOUS at 01:03

## 2020-03-20 RX ADMIN — INSULIN ASPART 8 UNITS: 100 INJECTION, SOLUTION INTRAVENOUS; SUBCUTANEOUS at 01:03

## 2020-03-20 RX ADMIN — DEXMEDETOMIDINE HYDROCHLORIDE 1.4 MCG/KG/HR: 100 INJECTION, SOLUTION, CONCENTRATE INTRAVENOUS at 07:03

## 2020-03-20 RX ADMIN — MINERAL OIL AND WHITE PETROLATUM: 150; 830 OINTMENT OPHTHALMIC at 08:03

## 2020-03-20 RX ADMIN — NICARDIPINE HYDROCHLORIDE 15 MG/HR: 0.2 INJECTION, SOLUTION INTRAVENOUS at 12:03

## 2020-03-20 RX ADMIN — CEFTRIAXONE 2 G: 2 INJECTION, SOLUTION INTRAVENOUS at 08:03

## 2020-03-20 RX ADMIN — DEXMEDETOMIDINE HYDROCHLORIDE 1.4 MCG/KG/HR: 100 INJECTION, SOLUTION, CONCENTRATE INTRAVENOUS at 04:03

## 2020-03-20 RX ADMIN — HYDROXYCHLOROQUINE SULFATE 200 MG: 200 TABLET, FILM COATED ORAL at 08:03

## 2020-03-20 NOTE — PROGRESS NOTES
Spoke with Rj from Infection Control regarding pt family wanting to retrieve pts personal belongings. Per Rj, ok to give items in personal belongings bag after wiped down and educate family on exercising caution with handling items (ie washing hands after use). Called and explained the above to Nay Somers. Nay verbalized understanding and to come  items.

## 2020-03-20 NOTE — HPI
History obtained from review of chart.  Pt is 30 y/o female w/ PMH DM2, HTN, and benign intracranial HTN who presented to [facility] on 3/13 complaining of shortness of breath, fevers, and cough X several days.  Prior to her presentation, she had been treated with amoxicillin with limited benefit.    She was admitted at [facility]. Pt's condition continued to deteriorate- worsening leukopenia, lymphopenia, elevated LDH, & CRP, & increasing oxygen requirements. CXR showed worsening multifocal airspace disease. Pt was transferred to MICU on 3/14. Pt decompensated &was intubated on 3/15 & on 3/16 results came back positive for Covid 19. Pt presents as transfer from [facility] ICU for possible ecmo.

## 2020-03-20 NOTE — PROGRESS NOTES
[facility]  Critical Care Medicine  Progress Note    Patient Name: Riri Gonsalves  MRN: [id number]  Admission Date: 3/17/2020  Hospital Length of Stay: 3 days  Code Status: Full Code  Attending Provider: Favio Sebastian MD  Primary Care Provider: Jocelynn Brooks MD   Principal Problem: Acute respiratory failure    Subjective:     HPI:  Pt is 29yoF w/ PMH DM2, HTN, and benign intracranial HTN who presented to [facility] on 3/13 complaining of shortness of breath, fevers, and cough. 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 According to notes from OSH pt had rhinorrhea & dry cough for several days- went to urgent care where she was told she had flu-like symptoms & was discharged home on amoxicillin. A few days later she was continuing to have fevers as high as 103 & was having worsening SOB at rest- went to [facility] ED & was initially feeling better on 2L NC. Labs on presentation were CRP 18.2, WBC 3.7, flu negative. Pt was admitted to medicine & started on empiric CAP coverage. Pt's condition continued to deteriorate- worsening leukopenia, lymphopenia, elevated LDH, & CRP, & increasing oxygen requirements. CXR showed worsening multifocal airspace disease. Pt was transferred to MICU on 3/14. Pt decompensated &was intubated on 3/15 & on 3/16 results came back positive for Covid 19. Echo showed normal LV function (EF 60%)Pt required fentanyl, precedex, and propofol. Pt also started on methylprednisolone. Pt was proned on 3/16. Pt was supinated prior to transfer.   Pt presents as transfer from [facility] ICU for possible ecmo.     Hospital/ICU Course:  Pt transferred from [facility] ICU on 3/17. Pt was pronated at 1745 on 3/17. Started cardine drip for hypertension & insulin drip for hyperglycemia. Pt supinated at 1100 on 3/18. Pt remains on precedex, and fentanyl.    Patient with hypertriglyceridemia, will wean propofol. Patient will start on Versed gtt and propofol gtt d/c'ed. Patient continue to be  volume up, patient started on scheduled 40mg Q6 IV lasix to euvolemic status.       Interval History/Significant Events: No acute events overnight. Still remains on fentanyl and propofol. Continue to be hypertensive around SBP of high 150's. Continuing Cardene gtt, patient started on coreg 25mg BID and scheduled hydralazine 100mg q8h.   Pt continue to be hyperglycemic- on insulin gtt to be transitioned to subQ.     Review of Systems   Unable to perform ROS: Intubated     Objective:     Vital Signs (Most Recent):  Temp: 99.9 °F (37.7 °C) (03/20/20 1100)  Pulse: 87 (03/20/20 1100)  Resp: (!) 24 (03/20/20 1100)  BP: (!) 146/53 (03/19/20 1432)  SpO2: 96 % (03/20/20 1100) Vital Signs (24h Range):  Temp:  [99.4 °F (37.4 °C)-100.8 °F (38.2 °C)] 99.9 °F (37.7 °C)  Pulse:  [87-98] 87  Resp:  [24-28] 24  SpO2:  [87 %-96 %] 96 %  BP: (146)/(53) 146/53  Arterial Line BP: (145-166)/(64-79) 151/70   Weight: (!) 149.6 kg (329 lb 12.9 oz)  Body mass index is 46 kg/m².      Intake/Output Summary (Last 24 hours) at 3/20/2020 1138  Last data filed at 3/20/2020 1102  Gross per 24 hour   Intake 5517.69 ml   Output 1135 ml   Net 4382.69 ml       Physical Exam   Constitutional: She appears well-developed.   Refer to staff attestation due to COVID 19 status    Pulmonary/Chest:   intubated   Neurological:   sedated   Nursing note and vitals reviewed.      Vents:  Vent Mode: A/C (03/20/20 0903)  Ventilator Initiated: Yes (03/17/20 1619)  Set Rate: 24 BPM (03/20/20 0903)  Vt Set: 380 mL (03/20/20 0903)  Pressure Support: 3 cmH20 (03/17/20 1700)  PEEP/CPAP: 10 cmH20 (03/20/20 0903)  Oxygen Concentration (%): 50 (03/20/20 0903)  Peak Airway Pressure: 33 cmH2O (03/20/20 0903)  Plateau Pressure: 28 cmH20 (03/20/20 0325)  Total Ve: 9.28 mL (03/20/20 0903)  F/VT Ratio<105 (RSBI): (!) 65.75 (03/20/20 0903)  Lines/Drains/Airways     Central Venous Catheter Line            Percutaneous Central Line Insertion/Assessment - Triple Lumen  03/17/20 1100  right internal jugular 3 days          Drain                 NG/OG Tube 03/17/20  Right mouth 3 days         Urethral Catheter 03/17/20 1200 2 days          Airway                 Airway - Non-Surgical -- days          Arterial Line                 Arterial Line 03/18/20 1344 Right Radial 1 day          Peripheral Intravenous Line                 Peripheral IV - Single Lumen 03/17/20  Anterior;Distal;Right Forearm 3 days         Peripheral IV - Single Lumen 03/17/20 Anterior;Proximal;Right Forearm 3 days              Significant Labs:    CBC/Anemia Profile:  Recent Labs   Lab 03/19/20  0300 03/20/20  0300   WBC 4.87 4.57   HGB 10.3* 10.4*   HCT 35.6* 36.0*    341   MCV 88 88   RDW 14.0 14.0        Chemistries:  Recent Labs   Lab 03/19/20  0300 03/20/20  0300   * 146*   K 4.4 4.4    109   CO2 29 26   BUN 11 15   CREATININE 0.8 0.7   CALCIUM 8.2* 7.8*   ALBUMIN 2.0* 2.0*   PROT 6.7 6.3   BILITOT 0.2 0.3   ALKPHOS 59 58   ALT 14 13   AST 13 18   MG 2.3 2.1   PHOS 2.3* 3.3             ABG  Recent Labs   Lab 03/20/20  0323   PH 7.414   PO2 73*   PCO2 40.5   HCO3 25.9   BE 1     Assessment/Plan:     Pulmonary  * Acute respiratory failure  [place] on 3[place]17 for ARDS 2/2 COVID 19. Pt was intubated on 3/15 & is on precedex, propofol, and fentanyl.   On transfer: , ferretin 368, D-Dimer >33  Pt continues to be febrile at 101    Plan:    - vent settings wean as tolerated   - continue methylprednisolone 40mg q6  - continue fentanyl and precedex- wean as tolerated hold Nimbex   - Goal to wean patient off propofol; started Versed gtt due to hypertriglyceridemia   - azithromycin & rocephin for emperic CAP coverage day 3  - f/u daily CRP  - Continuing plaquenil; waiting for approval of remdesevir    Cardiac/Vascular  Essential hypertension  Home med includes lisinopril 20mg daily & lasix 40mg daily.     Plan:  - nicardipine drip, will ask pharmacy to concentrated cardine gtt  - Coreg 25mg  BID  - Q6h hydralazine 100mg scheduled  - Patient net fluid positive 6L, schedule 40MG IV lasix Q6h     Endocrine  Type 2 diabetes mellitus without complication, without long-term current use of insulin  Pt has A1c 12.4 (3/17/20) & home med is metformin 500mg BID.     Plan:  - insulin drip at 3U +MDSSI  - accuchecks Q4  - Will transition patient off insulin gtt once BGL <250,   - Total insulin in 24h period 164U wean off insult gtt  - schedule 30U Detemir BID and MDSSI            Critical Care Daily Checklist:     A: Awake: RASS Goal/Actual Goal: RASS Goal: -4-->deep sedation  Actual: Cowan Agitation Sedation Scale (RASS): Deep sedation   B: Spontaneous Breathing Trial Performed?     C: SAT & SBT Coordinated?  N/A                      D: Delirium: CAM-ICU Overall CAM-ICU: Positive   E: Early Mobility Performed? No   F: Feeding Goal: Goals: Meet % EEN, EPN by RD f/u date  Status: Nutrition Goal Status: new         Current Diet Order   Procedures    Diet NPO       AS: Analgesia/Sedation  fentanyl, precedex   T: Thromboembolic Prophylaxis Lovenox   H: HOB > 300 Yes   U: Stress Ulcer Prophylaxis (if needed) Famotidine    G: Glucose Control Insulin drip/ 30U BID determir    B: Bowel Function Stool Occurrence: 0   I: Indwelling Catheter (Lines & Saxena) Necessity Saxena, a line, 2 PIV, ET tube   D: De-escalation of Antimicrobials/Pharmacotherapies       Plan for the day/ETD - Transition off of insulin gtt  - Hold Paralytic      Code Status:  Family/Goals of Care: Full Code               Critical secondary to Patient has a condition that poses threat to life and bodily function: Severe Respiratory Distress      Critical care was time spent personally by me on the following activities: development of treatment plan with patient or surrogate and bedside caregivers, discussions with consultants, evaluation of patient's response to treatment, examination of patient, ordering and performing treatments and interventions,  ordering and review of laboratory studies, ordering and review of radiographic studies, pulse oximetry, re-evaluation of patient's condition. This critical care time did not overlap with that of any other provider or involve time for any procedures.     CHEYENNE Lee MD  Critical Care Medicine  [facility]

## 2020-03-20 NOTE — CONSULTS
[facility]  Infectious Disease  Consult Note    Patient Name: Riri Gonsalves  MRN: [id number]  Admission Date: 3/17/2020  Hospital Length of Stay: 2 days  Attending Physician: Favio Sebastian MD  Primary Care Provider: Jocelynn Brooks MD     Isolation Status: Airborne and Contact and Droplet    Patient information was obtained from past medical records and ER records.      Consults  Assessment/Plan:     * Acute respiratory failure  30 y/o female with a history of diabetes mellitus admitted at [facility] and later transferred to [facility] for respiratory failure.  Patient tested positive for SARS-COV-2.  Hydroxychloroquine started.  Azithromycin started.  Some modest data suggests that this combination of hydroxychloroquine and azithromycin may be beneficial.  Primary teams also started the patient on ceftriaxone.  Application for compassionate use of remdesivir initiated.  Will follow along.        Thank you for your consult. I will follow-up with patient. Please contact us if you have any additional questions.    Arnoldo Hernandez MD  Infectious Disease  [facility]    Subjective:     Principal Problem: Acute respiratory failure    HPI: History obtained from review of chart.  Pt is 30 y/o female w/ PMH DM2, HTN, and benign intracranial HTN who presented to [facility] on 3/13 complaining of shortness of breath, fevers, and cough X several days.  Prior to her presentation, she had been treated with amoxicillin with limited benefit.    She was admitted at [facility]. Pt's condition continued to deteriorate- worsening leukopenia, lymphopenia, elevated LDH, & CRP, & increasing oxygen requirements. CXR showed worsening multifocal airspace disease. Pt was transferred to MICU on 3/14. Pt decompensated &was intubated on 3/15 & on 3/16 results came back positive for Covid 19. Pt presents as transfer from [facility] ICU for possible ecmo.    Past Medical History:   Diagnosis Date    Diabetes mellitus      Hypertension     IIH (idiopathic intracranial hypertension)     Seizure     remote Hx of seizure disorder        Past Surgical History:   Procedure Laterality Date    URETHRA SURGERY         Review of patient's allergies indicates:   Allergen Reactions    Zofran [ondansetron hcl (pf)] Other (See Comments)     Chest burns       Medications:  Medications Prior to Admission   Medication Sig    acetaZOLAMIDE (DIAMOX) 500 mg CpSR Take 1 capsule (500 mg total) by mouth 3 (three) times daily.    blood sugar diagnostic (ACCU-CHEK MARI PLUS TEST STRP) Strp 1 strip by Misc.(Non-Drug; Combo Route) route 3 (three) times daily before meals.    dulaglutide (TRULICITY) 0.75 mg/0.5 mL PnIj Inject 0.75 mg into the skin every 7 days.    etonogestrel (NEXPLANON) 68 mg Impl by Subdermal route.    furosemide (LASIX) 40 MG tablet Take 1 tablet (40 mg total) by mouth every morning.    HYDROcodone-acetaminophen (NORCO) 5-325 mg per tablet Take 1 tablet by mouth every 6 (six) hours as needed for Pain.    HYDROcodone-acetaminophen (NORCO) 5-325 mg per tablet Take 1 tablet by mouth every 8 (eight) hours as needed for Pain.    lisinopril (PRINIVIL,ZESTRIL) 20 MG tablet Take 1 tablet (20 mg total) by mouth once daily.    metformin (GLUCOPHAGE) 500 MG tablet Take 1 tablet (500 mg total) by mouth 2 (two) times daily.    metoclopramide HCl (REGLAN) 10 MG tablet Take 1 tablet (10 mg total) by mouth 3 (three) times daily before meals.    metoclopramide HCl (REGLAN) 10 MG tablet Take 1 tablet (10 mg total) by mouth every 6 (six) hours.    naproxen (NAPROSYN) 500 MG tablet Take 1 tablet (500 mg total) by mouth 2 (two) times daily with meals.    potassium chloride (KLOR-CON) 10 MEQ TbSR Take 1 tablet (10 mEq total) by mouth once daily.    promethazine (PHENERGAN) 25 MG tablet Take 1 tablet (25 mg total) by mouth every 6 (six) hours as needed for Nausea.    senna-docusate 8.6-50 mg (PERICOLACE) 8.6-50 mg per tablet Take 2 tablets by  mouth 2 (two) times daily.    topiramate (TOPAMAX) 50 MG tablet Take 1 tablet (50 mg total) by mouth 2 (two) times daily.     Antibiotics (From admission, onward)    Start     Stop Route Frequency Ordered    03/18/20 0900  cefTRIAXone (ROCEPHIN) 2 g in dextrose 5 % 50 mL IVPB      -- IV Every 24 hours (non-standard times) 03/17/20 1622    03/18/20 0900  azithromycin 500 mg in dextrose 5 % 250 mL IVPB (ready to mix system)      03/21 0859 IV Every 24 hours (non-standard times) 03/18/20 0646        Antifungals (From admission, onward)    None        Antivirals (From admission, onward)    None             There is no immunization history on file for this patient.    Family History     None        Social History     Socioeconomic History    Marital status: Single     Spouse name: Not on file    Number of children: Not on file    Years of education: Not on file    Highest education level: Not on file   Occupational History    Not on file   Social Needs    Financial resource strain: Not on file    Food insecurity:     Worry: Not on file     Inability: Not on file    Transportation needs:     Medical: Not on file     Non-medical: Not on file   Tobacco Use    Smoking status: Never Smoker    Smokeless tobacco: Never Used   Substance and Sexual Activity    Alcohol use: Yes    Drug use: No    Sexual activity: Yes   Lifestyle    Physical activity:     Days per week: Not on file     Minutes per session: Not on file    Stress: Not on file   Relationships    Social connections:     Talks on phone: Not on file     Gets together: Not on file     Attends Buddhism service: Not on file     Active member of club or organization: Not on file     Attends meetings of clubs or organizations: Not on file     Relationship status: Not on file   Other Topics Concern    Not on file   Social History Narrative    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    Estimated Creatinine Clearance: 170.6 mL/min (based on SCr of 0.8 mg/dL).    Physical Exam   Neurological:   sedated   Nursing note and vitals reviewed.      Significant Labs:   Microbiology Results (last 7 days)     Procedure Component Value Units Date/Time    Culture, Respiratory with Gram Stain [[id number]]     Order Status:  No result Specimen:  Respiratory from Tracheal Aspirate     Blood culture [[id number]]     Order Status:  No result Specimen:  Blood     Blood culture [[id number]]     Order Status:  No result Specimen:  Blood     Influenza A & B by Molecular [[id number]] Collected:  03/17/20 1613    Order Status:  Completed Specimen:  Nasopharyngeal Swab Updated:  03/17/20 1654     Influenza A, Molecular Negative     Influenza B, Molecular Negative     Flu A & B Source np          Significant Imaging: I have reviewed all pertinent imaging results/findings within the past 24 hours.

## 2020-03-20 NOTE — PLAN OF CARE
CMICU DAILY GOALS       A: Awake    RASS: Goal - RASS Goal: -5-->unarousable  Actual - RASS (Cowan Agitation-Sedation Scale): -5-->unarousable   Restraint necessity: Clinical Justification: Treatment Interference, Removing medical devices  B: Breath   SBT: Not attempted   C: Coordinate A & B, analgesics/sedatives   Pain: managed    SAT: Not attempted  D: Delirium   CAM-ICU: Overall CAM-ICU: Positive  E: Early Mobility   MOVE Screen: Fail   Activity: Activity Management: bedrest maintained per order  FAS: Feeding/Nutrition   Diet order: Diet/Nutrition Received: NPO,   Fluid restriction:    T: Thrombus   DVT prophylaxis: VTE Required Core Measure: Pharmacological prophylaxis initiated/maintained  H: HOB Elevation   Head of Bed (HOB): HOB at 30-45 degrees  U: Ulcer Prophylaxis   GI: yes  G: Glucose control   managed Glycemic Management: blood glucose monitoring  S: Skin   Bundle compliance: yes   Bathing/Skin Care: bath, chlorhexidine, bath, partial, dressed/undressed, linen changed Date: Partial chlorhexidine bath done at 5am  B: Bowel Function   no issues   I: Indwelling Catheters   Saxena necessity:      Urethral Catheter 03/17/20 1200-Reason for Continuing Urinary Catheterization: Critically ill in ICU requiring intensive monitoring   CVC necessity: Yes   IPAD offered: Not appropriate  D: De-escalation Antibx   No  Plan for the day   Continue to monitor.  Family/Goals of care/Code Status   Code Status: Full Code     No acute events throughout day, VS and assessment per flow sheet, patient progressing towards goals as tolerated, plan of care reviewed with Riri Gonsalves and family, all concerns addressed, will continue to monitor.

## 2020-03-20 NOTE — ASSESSMENT & PLAN NOTE
Pt is 29yoF w/ PMH DM2, HTN, and benign intracranial HTN transferred to [facility] on 3/17 for possible ECMOAs per chart review and info from OSH pt had rhinorrhea & dry cough for several days- went to urgent care- was discharged home on amoxicillin. A few days later she was continuing to have fevers as high as 103 & was having worsening SOB at rest- went to [facility]  3/13 with SOB, fevers, and cough.   Was on 2L oxygen. Labs on presentation were CRP 18.2, WBC 3.7, flu negative. Pt was admitted to medicine & started on empiric CAP coverage. Pt's condition continued to deteriorate- worsening leukopenia, lymphopenia, elevated LDH, & CRP, & increasing oxygen requirements. CXR showed worsening multifocal airspace disease. Pt in ARDS. Pt was transferred to MICU on 3/14. Pt decompensated & was intubated on 3/15 & on 3/16 results came back positive for Covid 19. Echo showed normal LV function (EF 60%). Pt was also started on methylprednisolone. 3/17 transferred to [facility]. ID consulted for COVID 19 management.   Hydroxychloroquine started 3/18, currently on CFTX and azithromycin  Recommendations  Remdesivir compassionate use application in process  Continue Hydroxycloroquine  Continue CFTX/Azithromycin  Follow QTc closely

## 2020-03-20 NOTE — NURSING
Critical care team came, reviewed patient's status and made orders for respiratory and blood cultures. They planned to come and assess again at 10PM.

## 2020-03-20 NOTE — SUBJECTIVE & OBJECTIVE
Interval History: intubated and sedated on FiO2 50%/PEEP 10, of cardene drip this AM    Review of Systems   Unable to perform ROS: Intubated     Objective:     Vital Signs (Most Recent):  Temp: (!) 100.6 °F (38.1 °C) (03/20/20 1700)  Pulse: 83 (03/20/20 1700)  Resp: (!) 24 (03/20/20 1700)  BP: (!) 146/53 (03/19/20 1432)  SpO2: (!) 94 % (03/20/20 1700) Vital Signs (24h Range):  Temp:  [99.4 °F (37.4 °C)-100.6 °F (38.1 °C)] 100.6 °F (38.1 °C)  Pulse:  [82-96] 83  Resp:  [24-28] 24  SpO2:  [90 %-97 %] 94 %  Arterial Line BP: (145-175)/(64-94) 170/84     Weight: (!) 149.6 kg (329 lb 12.9 oz)  Body mass index is 46 kg/m².    Estimated Creatinine Clearance: 195 mL/min (based on SCr of 0.7 mg/dL).    Physical Exam   Nursing note and vitals reviewed.  Patient not examined because of CoVID19 isolation to limit exposure and use of PPE, discussed with RN      Significant Labs:   CBC:   Recent Labs   Lab 03/19/20  0300 03/20/20  0300   WBC 4.87 4.57   HGB 10.3* 10.4*   HCT 35.6* 36.0*    341     CMP:   Recent Labs   Lab 03/19/20  0300 03/20/20  0300   * 146*   K 4.4 4.4    109   CO2 29 26   * 305*   BUN 11 15   CREATININE 0.8 0.7   CALCIUM 8.2* 7.8*   PROT 6.7 6.3   ALBUMIN 2.0* 2.0*   BILITOT 0.2 0.3   ALKPHOS 59 58   AST 13 18   ALT 14 13   ANIONGAP 9 11   EGFRNONAA >60.0 >60.0     Microbiology Results (last 7 days)     Procedure Component Value Units Date/Time    Blood culture [[id number]] Collected:  03/19/20 2300    Order Status:  Completed Specimen:  Blood from Peripheral, Hand, Left Updated:  03/20/20 0915     Blood Culture, Routine No Growth to date    Blood culture [[id number]] Collected:  03/19/20 2300    Order Status:  Completed Specimen:  Blood from Peripheral, Forearm, Left Updated:  03/20/20 0915     Blood Culture, Routine No Growth to date    Culture, Respiratory with Gram Stain [[id number]] Collected:  03/20/20 0119    Order Status:  Completed Specimen:  Respiratory from Endotracheal  Aspirate Updated:  03/20/20 0238     Gram Stain (Respiratory) <10 epithelial cells per low power field.     Gram Stain (Respiratory) Rare WBC's     Gram Stain (Respiratory) No organisms seen    Culture, Respiratory with Gram Stain [[id number]] Collected:  03/19/20 2201    Order Status:  Canceled Specimen:  Respiratory from Tracheal Aspirate     Influenza A & B by Molecular [[id number]] Collected:  03/17/20 1613    Order Status:  Completed Specimen:  Nasopharyngeal Swab Updated:  03/17/20 1654     Influenza A, Molecular Negative     Influenza B, Molecular Negative     Flu A & B Source np        All pertinent labs within the past 24 hours have been reviewed.    Significant Imaging: I have reviewed all pertinent imaging results/findings within the past 24 hours.

## 2020-03-20 NOTE — PLAN OF CARE
Patient not medically stable for stepdown at this time. Patient continues to require CCM service for:     ETT  Cardene drip  Insulin drip  Sedation    CM remains available for any further patient/family needs or concerns.        03/20/20 1242   Discharge Reassessment   Assessment Type Discharge Planning Reassessment   Do you have any problems affording any of your prescribed medications? No   Discharge Plan A Home with family   DME Needed Upon Discharge  other (see comments)  (TBD)   Anticipated Discharge Disposition Home   Can the patient/caregiver answer the patient profile reliably? No, cognitively impaired     Joaquín Justice RN MSN  Critical Care-   Ext. [phone]

## 2020-03-20 NOTE — SUBJECTIVE & OBJECTIVE
Past Medical History:   Diagnosis Date    Diabetes mellitus     Hypertension     IIH (idiopathic intracranial hypertension)     Seizure     remote Hx of seizure disorder        Past Surgical History:   Procedure Laterality Date    URETHRA SURGERY         Review of patient's allergies indicates:   Allergen Reactions    Zofran [ondansetron hcl (pf)] Other (See Comments)     Chest burns       Medications:  Medications Prior to Admission   Medication Sig    acetaZOLAMIDE (DIAMOX) 500 mg CpSR Take 1 capsule (500 mg total) by mouth 3 (three) times daily.    blood sugar diagnostic (ACCU-CHEK MARI PLUS TEST STRP) Strp 1 strip by Misc.(Non-Drug; Combo Route) route 3 (three) times daily before meals.    dulaglutide (TRULICITY) 0.75 mg/0.5 mL PnIj Inject 0.75 mg into the skin every 7 days.    etonogestrel (NEXPLANON) 68 mg Impl by Subdermal route.    furosemide (LASIX) 40 MG tablet Take 1 tablet (40 mg total) by mouth every morning.    HYDROcodone-acetaminophen (NORCO) 5-325 mg per tablet Take 1 tablet by mouth every 6 (six) hours as needed for Pain.    HYDROcodone-acetaminophen (NORCO) 5-325 mg per tablet Take 1 tablet by mouth every 8 (eight) hours as needed for Pain.    lisinopril (PRINIVIL,ZESTRIL) 20 MG tablet Take 1 tablet (20 mg total) by mouth once daily.    metformin (GLUCOPHAGE) 500 MG tablet Take 1 tablet (500 mg total) by mouth 2 (two) times daily.    metoclopramide HCl (REGLAN) 10 MG tablet Take 1 tablet (10 mg total) by mouth 3 (three) times daily before meals.    metoclopramide HCl (REGLAN) 10 MG tablet Take 1 tablet (10 mg total) by mouth every 6 (six) hours.    naproxen (NAPROSYN) 500 MG tablet Take 1 tablet (500 mg total) by mouth 2 (two) times daily with meals.    potassium chloride (KLOR-CON) 10 MEQ TbSR Take 1 tablet (10 mEq total) by mouth once daily.    promethazine (PHENERGAN) 25 MG tablet Take 1 tablet (25 mg total) by mouth every 6 (six) hours as needed for Nausea.     senna-docusate 8.6-50 mg (PERICOLACE) 8.6-50 mg per tablet Take 2 tablets by mouth 2 (two) times daily.    topiramate (TOPAMAX) 50 MG tablet Take 1 tablet (50 mg total) by mouth 2 (two) times daily.     Antibiotics (From admission, onward)    Start     Stop Route Frequency Ordered    03/18/20 0900  cefTRIAXone (ROCEPHIN) 2 g in dextrose 5 % 50 mL IVPB      -- IV Every 24 hours (non-standard times) 03/17/20 1622    03/18/20 0900  azithromycin 500 mg in dextrose 5 % 250 mL IVPB (ready to mix system)      03/21 0859 IV Every 24 hours (non-standard times) 03/18/20 0646        Antifungals (From admission, onward)    None        Antivirals (From admission, onward)    None             There is no immunization history on file for this patient.    Family History     None        Social History     Socioeconomic History    Marital status: Single     Spouse name: Not on file    Number of children: Not on file    Years of education: Not on file    Highest education level: Not on file   Occupational History    Not on file   Social Needs    Financial resource strain: Not on file    Food insecurity:     Worry: Not on file     Inability: Not on file    Transportation needs:     Medical: Not on file     Non-medical: Not on file   Tobacco Use    Smoking status: Never Smoker    Smokeless tobacco: Never Used   Substance and Sexual Activity    Alcohol use: Yes    Drug use: No    Sexual activity: Yes   Lifestyle    Physical activity:     Days per week: Not on file     Minutes per session: Not on file    Stress: Not on file   Relationships    Social connections:     Talks on phone: Not on file     Gets together: Not on file     Attends [employer] service: Not on file     Active member of club or organization: Not on file     Attends meetings of clubs or organizations: Not on file     Relationship status: Not on file   Other Topics Concern    Not on file   Social History Narrative    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    Estimated Creatinine Clearance: 170.6 mL/min (based on SCr of 0.8 mg/dL).    Physical Exam   Neurological:   sedated   Nursing note and vitals reviewed.      Significant Labs:   Microbiology Results (last 7 days)     Procedure Component Value Units Date/Time    Culture, Respiratory with Gram Stain [[id number]]     Order Status:  No result Specimen:  Respiratory from Tracheal Aspirate     Blood culture [[id number]]     Order Status:  No result Specimen:  Blood     Blood culture [[id number]]     Order Status:  No result Specimen:  Blood     Influenza A & B by Molecular [[id number]] Collected:  03/17/20 1613    Order Status:  Completed Specimen:  Nasopharyngeal Swab Updated:  03/17/20 1654     Influenza A, Molecular Negative     Influenza B, Molecular Negative     Flu A & B Source np          Significant Imaging: I have reviewed all pertinent imaging results/findings within the past 24 hours.

## 2020-03-20 NOTE — PROGRESS NOTES
[facility]  Infectious Disease  Progress Note    Patient Name: Riri Gonsalves  MRN: [id number]  Admission Date: 3/17/2020  Length of Stay: 3 days  Attending Physician: Favio Sebastian MD  Primary Care Provider: Jocelynn Brooks MD    Isolation Status: Airborne and Contact and Droplet  Assessment/Plan:      * Acute respiratory failure with CoVID19 +  Pt is 29yoF w/ PMH DM2, HTN, and benign intracranial HTN [place] [facility] [place] for possible ECMOAs per chart review and info from OSH pt had rhinorrhea & dry cough for several days- went to urgent care- was discharged home on amoxicillin. A few days later she was continuing to have fevers as high as 103 & was having worsening SOB at rest- went to [place]  3/13 with SOB, fevers, and cough.   Was on 2L oxygen. Labs on presentation were CRP 18.2, WBC 3.7, flu negative. Pt was admitted to medicine & started on empiric CAP coverage. Pt's condition continued to deteriorate- worsening leukopenia, lymphopenia, elevated LDH, & CRP, & increasing oxygen requirements. CXR showed worsening multifocal airspace disease. Pt in ARDS. Pt was transferred to MICU on 3/14. Pt decompensated & was intubated on 3/15 & on 3/16 results came back positive for Covid 19. Echo showed normal LV function (EF 60%). Pt was also started on methylprednisolone. 3[place]17 [place] [facility] ID consulted for COVID 19 management.   Hydroxychloroquine started 3/18, currently on CFTX and azithromycin  Recommendations  Remdesivir compassionate use application in process  Continue Hydroxycloroquine  Continue COVID 19 isolation protocol, limit visitors  Continue CFTX/Azithromycin  Follow QTc closely        Case discussed with Dr Egan    Thank you for your consult. I will follow-up with patient. Please contact us if you have any additional questions.    Floyd Witt MD   ID Fellow  Infectious Disease  [facility]    Subjective:     Principal Problem:Acute  respiratory failure    HPI: History obtained from review of chart.  Pt is 28 y/o female w/ PMH DM2, HTN, and benign intracranial HTN who presented to [facility] on 3/13 complaining of shortness of breath, fevers, and cough X several days.  Prior to her presentation, she had been treated with amoxicillin with limited benefit.    She was admitted at [facility]. Pt's condition continued to deteriorate- worsening leukopenia, lymphopenia, elevated LDH, & CRP, & increasing oxygen requirements. CXR showed worsening multifocal airspace disease. Pt was transferred to MICU on 3/14. Pt decompensated &was intubated on 3/15 & on 3/16 results came back positive for Covid 19. Pt presents as transfer from [facility] ICU for possible ecmo.  Interval History: intubated and sedated on FiO2 50%/PEEP 10, of cardene drip this AM    Review of Systems   Unable to perform ROS: Intubated     Objective:     Vital Signs (Most Recent):  Temp: (!) 100.6 °F (38.1 °C) (03/20/20 1700)  Pulse: 83 (03/20/20 1700)  Resp: (!) 24 (03/20/20 1700)  BP: (!) 146/53 (03/19/20 1432)  SpO2: (!) 94 % (03/20/20 1700) Vital Signs (24h Range):  Temp:  [99.4 °F (37.4 °C)-100.6 °F (38.1 °C)] 100.6 °F (38.1 °C)  Pulse:  [82-96] 83  Resp:  [24-28] 24  SpO2:  [90 %-97 %] 94 %  Arterial Line BP: (145-175)/(64-94) 170/84     Weight: (!) 149.6 kg (329 lb 12.9 oz)  Body mass index is 46 kg/m².    Estimated Creatinine Clearance: 195 mL/min (based on SCr of 0.7 mg/dL).    Physical Exam   Nursing note and vitals reviewed.  Patient not examined because of CoVID19 isolation to limit exposure and use of PPE, discussed with RN      Significant Labs:   CBC:   Recent Labs   Lab 03/19/20  0300 03/20/20  0300   WBC 4.87 4.57   HGB 10.3* 10.4*   HCT 35.6* 36.0*    341     CMP:   Recent Labs   Lab 03/19/20  0300 03/20/20  0300   * 146*   K 4.4 4.4    109   CO2 29 26   * 305*   BUN 11 15   CREATININE 0.8 0.7   CALCIUM 8.2* 7.8*   PROT 6.7 6.3   ALBUMIN 2.0* 2.0*   BILITOT  0.2 0.3   ALKPHOS 59 58   AST 13 18   ALT 14 13   ANIONGAP 9 11   EGFRNONAA >60.0 >60.0     Microbiology Results (last 7 days)     Procedure Component Value Units Date/Time    Blood culture [[id number]] Collected:  03/19/20 2300    Order Status:  Completed Specimen:  Blood from Peripheral, Hand, Left Updated:  03/20/20 0915     Blood Culture, Routine No Growth to date    Blood culture [[id number]] Collected:  03/19/20 2300    Order Status:  Completed Specimen:  Blood from Peripheral, Forearm, Left Updated:  03/20/20 0915     Blood Culture, Routine No Growth to date    Culture, Respiratory with Gram Stain [[id number]] Collected:  03/20/20 0119    Order Status:  Completed Specimen:  Respiratory from Endotracheal Aspirate Updated:  03/20/20 0238     Gram Stain (Respiratory) <10 epithelial cells per low power field.     Gram Stain (Respiratory) Rare WBC's     Gram Stain (Respiratory) No organisms seen    Culture, Respiratory with Gram Stain [[id number]] Collected:  03/19/20 2201    Order Status:  Canceled Specimen:  Respiratory from Tracheal Aspirate     Influenza A & B by Molecular [[id number]] Collected:  03/17/20 1613    Order Status:  Completed Specimen:  Nasopharyngeal Swab Updated:  03/17/20 1654     Influenza A, Molecular Negative     Influenza B, Molecular Negative     Flu A & B Source np        All pertinent labs within the past 24 hours have been reviewed.    Significant Imaging: I have reviewed all pertinent imaging results/findings within the past 24 hours.

## 2020-03-20 NOTE — ASSESSMENT & PLAN NOTE
28 y/o female with a history of diabetes mellitus admitted at [facility] and later transferred to [facility] for respiratory failure.  Patient tested positive for SARS-COV-2.  Hydroxychloroquine started.  Azithromycin started.  Some modest data suggests that this combination of hydroxychloroquine and azithromycin may be beneficial.  Primary teams also started the patient on ceftriaxone.  Application for compassionate use of remdesivir initiated.  Will follow along.

## 2020-03-20 NOTE — CARE UPDATE
Called Patient's mother and given update about patient's condition. Patient's mother thought that she was getting Remdesevir but notified that she has not gotten the anti viral. Gave report about her ventilator and her breathing status. Her glucose, removing fluid using lasix and we will hold paralytic to see how she does on vent. Answered all of her questions to satisfaction.     CHEYENNE Lee M.D.  Critical Care Medicine PGY-2  [phone]

## 2020-03-21 LAB
ALBUMIN SERPL BCP-MCNC: 2.3 G/DL (ref 3.5–5.2)
ALLENS TEST: ABNORMAL
ALP SERPL-CCNC: 55 U/L (ref 55–135)
ALT SERPL W/O P-5'-P-CCNC: 12 U/L (ref 10–44)
ANION GAP SERPL CALC-SCNC: 8 MMOL/L (ref 8–16)
AST SERPL-CCNC: 15 U/L (ref 10–40)
BASOPHILS # BLD AUTO: 0.02 K/UL (ref 0–0.2)
BASOPHILS NFR BLD: 0.4 % (ref 0–1.9)
BILIRUB SERPL-MCNC: 0.3 MG/DL (ref 0.1–1)
BUN SERPL-MCNC: 17 MG/DL (ref 6–20)
CALCIUM SERPL-MCNC: 8.3 MG/DL (ref 8.7–10.5)
CHLORIDE SERPL-SCNC: 103 MMOL/L (ref 95–110)
CO2 SERPL-SCNC: 32 MMOL/L (ref 23–29)
CREAT SERPL-MCNC: 0.8 MG/DL (ref 0.5–1.4)
CRP SERPL-MCNC: 30.2 MG/L (ref 0–8.2)
DELSYS: ABNORMAL
DIFFERENTIAL METHOD: ABNORMAL
EOSINOPHIL # BLD AUTO: 0 K/UL (ref 0–0.5)
EOSINOPHIL NFR BLD: 0 % (ref 0–8)
ERYTHROCYTE [DISTWIDTH] IN BLOOD BY AUTOMATED COUNT: 14 % (ref 11.5–14.5)
ERYTHROCYTE [SEDIMENTATION RATE] IN BLOOD BY WESTERGREN METHOD: 24 MM/H
EST. GFR  (AFRICAN AMERICAN): >60 ML/MIN/1.73 M^2
EST. GFR  (NON AFRICAN AMERICAN): >60 ML/MIN/1.73 M^2
FIO2: 60
GLUCOSE SERPL-MCNC: 218 MG/DL (ref 70–110)
HCO3 UR-SCNC: 35.3 MMOL/L (ref 24–28)
HCT VFR BLD AUTO: 36.7 % (ref 37–48.5)
HGB BLD-MCNC: 10.5 G/DL (ref 12–16)
IMM GRANULOCYTES # BLD AUTO: 0.22 K/UL (ref 0–0.04)
IMM GRANULOCYTES NFR BLD AUTO: 4 % (ref 0–0.5)
LYMPHOCYTES # BLD AUTO: 1.2 K/UL (ref 1–4.8)
LYMPHOCYTES NFR BLD: 20.8 % (ref 18–48)
MAGNESIUM SERPL-MCNC: 2.2 MG/DL (ref 1.6–2.6)
MCH RBC QN AUTO: 25.3 PG (ref 27–31)
MCHC RBC AUTO-ENTMCNC: 28.6 G/DL (ref 32–36)
MCV RBC AUTO: 88 FL (ref 82–98)
MIN VOL: 9.19
MODE: ABNORMAL
MONOCYTES # BLD AUTO: 0.5 K/UL (ref 0.3–1)
MONOCYTES NFR BLD: 9.6 % (ref 4–15)
NEUTROPHILS # BLD AUTO: 3.6 K/UL (ref 1.8–7.7)
NEUTROPHILS NFR BLD: 65.2 % (ref 38–73)
NRBC BLD-RTO: 0 /100 WBC
PCO2 BLDA: 57.3 MMHG (ref 35–45)
PEEP: 10
PH SMN: 7.4 [PH] (ref 7.35–7.45)
PHOSPHATE SERPL-MCNC: 3.5 MG/DL (ref 2.7–4.5)
PIP: 30
PLATELET # BLD AUTO: 304 K/UL (ref 150–350)
PMV BLD AUTO: 9.6 FL (ref 9.2–12.9)
PO2 BLDA: 93 MMHG (ref 80–100)
POC BE: 10 MMOL/L
POC SATURATED O2: 97 % (ref 95–100)
POC TCO2: 37 MMOL/L (ref 23–27)
POCT GLUCOSE: 121 MG/DL (ref 70–110)
POCT GLUCOSE: 192 MG/DL (ref 70–110)
POCT GLUCOSE: 204 MG/DL (ref 70–110)
POCT GLUCOSE: 243 MG/DL (ref 70–110)
POCT GLUCOSE: 255 MG/DL (ref 70–110)
POCT GLUCOSE: 306 MG/DL (ref 70–110)
POCT GLUCOSE: 325 MG/DL (ref 70–110)
POTASSIUM SERPL-SCNC: 4 MMOL/L (ref 3.5–5.1)
PROT SERPL-MCNC: 6.6 G/DL (ref 6–8.4)
RBC # BLD AUTO: 4.15 M/UL (ref 4–5.4)
SAMPLE: ABNORMAL
SITE: ABNORMAL
SODIUM SERPL-SCNC: 143 MMOL/L (ref 136–145)
SP02: 97
VT: 380
WBC # BLD AUTO: 5.54 K/UL (ref 3.9–12.7)

## 2020-03-21 PROCEDURE — 83735 ASSAY OF MAGNESIUM: CPT

## 2020-03-21 PROCEDURE — 85025 COMPLETE CBC W/AUTO DIFF WBC: CPT

## 2020-03-21 PROCEDURE — 94761 N-INVAS EAR/PLS OXIMETRY MLT: CPT

## 2020-03-21 PROCEDURE — 84100 ASSAY OF PHOSPHORUS: CPT

## 2020-03-21 PROCEDURE — 63600175 PHARM REV CODE 636 W HCPCS: Performed by: INTERNAL MEDICINE

## 2020-03-21 PROCEDURE — 82803 BLOOD GASES ANY COMBINATION: CPT

## 2020-03-21 PROCEDURE — 25000003 PHARM REV CODE 250: Performed by: STUDENT IN AN ORGANIZED HEALTH CARE EDUCATION/TRAINING PROGRAM

## 2020-03-21 PROCEDURE — 37799 UNLISTED PX VASCULAR SURGERY: CPT

## 2020-03-21 PROCEDURE — 94003 VENT MGMT INPAT SUBQ DAY: CPT

## 2020-03-21 PROCEDURE — 99233 SBSQ HOSP IP/OBS HIGH 50: CPT | Mod: ,,, | Performed by: INTERNAL MEDICINE

## 2020-03-21 PROCEDURE — 99900035 HC TECH TIME PER 15 MIN (STAT)

## 2020-03-21 PROCEDURE — 27000221 HC OXYGEN, UP TO 24 HOURS

## 2020-03-21 PROCEDURE — 25000242 PHARM REV CODE 250 ALT 637 W/ HCPCS: Performed by: STUDENT IN AN ORGANIZED HEALTH CARE EDUCATION/TRAINING PROGRAM

## 2020-03-21 PROCEDURE — 94640 AIRWAY INHALATION TREATMENT: CPT

## 2020-03-21 PROCEDURE — 99291 CRITICAL CARE FIRST HOUR: CPT | Mod: ,,, | Performed by: INTERNAL MEDICINE

## 2020-03-21 PROCEDURE — 63600175 PHARM REV CODE 636 W HCPCS: Performed by: STUDENT IN AN ORGANIZED HEALTH CARE EDUCATION/TRAINING PROGRAM

## 2020-03-21 PROCEDURE — 25000003 PHARM REV CODE 250: Performed by: INTERNAL MEDICINE

## 2020-03-21 PROCEDURE — 80053 COMPREHEN METABOLIC PANEL: CPT

## 2020-03-21 PROCEDURE — S0028 INJECTION, FAMOTIDINE, 20 MG: HCPCS | Performed by: STUDENT IN AN ORGANIZED HEALTH CARE EDUCATION/TRAINING PROGRAM

## 2020-03-21 PROCEDURE — 99900026 HC AIRWAY MAINTENANCE (STAT)

## 2020-03-21 PROCEDURE — 99233 PR SUBSEQUENT HOSPITAL CARE,LEVL III: ICD-10-PCS | Mod: ,,, | Performed by: INTERNAL MEDICINE

## 2020-03-21 PROCEDURE — 86140 C-REACTIVE PROTEIN: CPT

## 2020-03-21 PROCEDURE — 20000000 HC ICU ROOM

## 2020-03-21 PROCEDURE — 99291 PR CRITICAL CARE, E/M 30-74 MINUTES: ICD-10-PCS | Mod: ,,, | Performed by: INTERNAL MEDICINE

## 2020-03-21 RX ORDER — PROPOFOL 10 MG/ML
5 INJECTION, EMULSION INTRAVENOUS CONTINUOUS
Status: DISCONTINUED | OUTPATIENT
Start: 2020-03-21 | End: 2020-03-21

## 2020-03-21 RX ORDER — PROPOFOL 10 MG/ML
5 INJECTION, EMULSION INTRAVENOUS CONTINUOUS
Status: DISCONTINUED | OUTPATIENT
Start: 2020-03-21 | End: 2020-03-23

## 2020-03-21 RX ADMIN — INSULIN ASPART 4 UNITS: 100 INJECTION, SOLUTION INTRAVENOUS; SUBCUTANEOUS at 02:03

## 2020-03-21 RX ADMIN — FUROSEMIDE 40 MG: 10 INJECTION, SOLUTION INTRAVENOUS at 12:03

## 2020-03-21 RX ADMIN — HYDRALAZINE HYDROCHLORIDE 100 MG: 50 TABLET ORAL at 12:03

## 2020-03-21 RX ADMIN — NICARDIPINE HYDROCHLORIDE 10 MG/HR: 0.2 INJECTION, SOLUTION INTRAVENOUS at 12:03

## 2020-03-21 RX ADMIN — SODIUM CHLORIDE 30 MG/ML INHALATION SOLUTION 4 ML: 30 SOLUTION INHALANT at 08:03

## 2020-03-21 RX ADMIN — DEXMEDETOMIDINE HYDROCHLORIDE 1.4 MCG/KG/HR: 100 INJECTION, SOLUTION, CONCENTRATE INTRAVENOUS at 09:03

## 2020-03-21 RX ADMIN — PROPOFOL 20 MCG/KG/MIN: 10 INJECTION, EMULSION INTRAVENOUS at 09:03

## 2020-03-21 RX ADMIN — NICARDIPINE HYDROCHLORIDE 7.5 MG/HR: 0.2 INJECTION, SOLUTION INTRAVENOUS at 02:03

## 2020-03-21 RX ADMIN — DEXMEDETOMIDINE HYDROCHLORIDE 1.4 MCG/KG/HR: 100 INJECTION, SOLUTION, CONCENTRATE INTRAVENOUS at 02:03

## 2020-03-21 RX ADMIN — DEXMEDETOMIDINE HYDROCHLORIDE 1.4 MCG/KG/HR: 100 INJECTION, SOLUTION, CONCENTRATE INTRAVENOUS at 07:03

## 2020-03-21 RX ADMIN — HYDROXYCHLOROQUINE SULFATE 200 MG: 200 TABLET, FILM COATED ORAL at 08:03

## 2020-03-21 RX ADMIN — SODIUM CHLORIDE 4 UNITS/HR: 9 INJECTION, SOLUTION INTRAVENOUS at 11:03

## 2020-03-21 RX ADMIN — SODIUM CHLORIDE 30 MG/ML INHALATION SOLUTION 4 ML: 30 SOLUTION INHALANT at 07:03

## 2020-03-21 RX ADMIN — DEXMEDETOMIDINE HYDROCHLORIDE 1.4 MCG/KG/HR: 100 INJECTION, SOLUTION, CONCENTRATE INTRAVENOUS at 12:03

## 2020-03-21 RX ADMIN — SODIUM CHLORIDE 30 MG/ML INHALATION SOLUTION 4 ML: 30 SOLUTION INHALANT at 01:03

## 2020-03-21 RX ADMIN — CARVEDILOL 25 MG: 25 TABLET, FILM COATED ORAL at 09:03

## 2020-03-21 RX ADMIN — MINERAL OIL AND WHITE PETROLATUM: 150; 830 OINTMENT OPHTHALMIC at 10:03

## 2020-03-21 RX ADMIN — Medication 450 MCG/HR: at 11:03

## 2020-03-21 RX ADMIN — DEXMEDETOMIDINE HYDROCHLORIDE 1.4 MCG/KG/HR: 100 INJECTION, SOLUTION, CONCENTRATE INTRAVENOUS at 04:03

## 2020-03-21 RX ADMIN — NICARDIPINE HYDROCHLORIDE 7.5 MG/HR: 0.2 INJECTION, SOLUTION INTRAVENOUS at 09:03

## 2020-03-21 RX ADMIN — FAMOTIDINE 20 MG: 10 INJECTION INTRAVENOUS at 08:03

## 2020-03-21 RX ADMIN — FAMOTIDINE 20 MG: 10 INJECTION INTRAVENOUS at 09:03

## 2020-03-21 RX ADMIN — FUROSEMIDE 40 MG: 10 INJECTION, SOLUTION INTRAVENOUS at 11:03

## 2020-03-21 RX ADMIN — Medication 450 MCG/HR: at 08:03

## 2020-03-21 RX ADMIN — METHYLPREDNISOLONE SODIUM SUCCINATE 40 MG: 40 INJECTION, POWDER, FOR SOLUTION INTRAMUSCULAR; INTRAVENOUS at 08:03

## 2020-03-21 RX ADMIN — DEXMEDETOMIDINE HYDROCHLORIDE 1.4 MCG/KG/HR: 100 INJECTION, SOLUTION, CONCENTRATE INTRAVENOUS at 05:03

## 2020-03-21 RX ADMIN — HYDRALAZINE HYDROCHLORIDE 100 MG: 50 TABLET ORAL at 05:03

## 2020-03-21 RX ADMIN — HYDROXYCHLOROQUINE SULFATE 200 MG: 200 TABLET, FILM COATED ORAL at 09:03

## 2020-03-21 RX ADMIN — CHLORHEXIDINE GLUCONATE 0.12% ORAL RINSE 15 ML: 1.2 LIQUID ORAL at 08:03

## 2020-03-21 RX ADMIN — PROPOFOL 5 MCG/KG/MIN: 10 INJECTION, EMULSION INTRAVENOUS at 05:03

## 2020-03-21 RX ADMIN — DEXMEDETOMIDINE HYDROCHLORIDE 1.4 MCG/KG/HR: 100 INJECTION, SOLUTION, CONCENTRATE INTRAVENOUS at 10:03

## 2020-03-21 RX ADMIN — NICARDIPINE HYDROCHLORIDE 7.5 MG/HR: 0.2 INJECTION, SOLUTION INTRAVENOUS at 05:03

## 2020-03-21 RX ADMIN — HYDRALAZINE HYDROCHLORIDE 100 MG: 50 TABLET ORAL at 09:03

## 2020-03-21 RX ADMIN — INSULIN ASPART 3 UNITS: 100 INJECTION, SOLUTION INTRAVENOUS; SUBCUTANEOUS at 01:03

## 2020-03-21 RX ADMIN — CARVEDILOL 25 MG: 25 TABLET, FILM COATED ORAL at 08:03

## 2020-03-21 RX ADMIN — SODIUM CHLORIDE 30 MG/ML INHALATION SOLUTION 4 ML: 30 SOLUTION INHALANT at 02:03

## 2020-03-21 RX ADMIN — FUROSEMIDE 40 MG: 10 INJECTION, SOLUTION INTRAVENOUS at 05:03

## 2020-03-21 RX ADMIN — DEXMEDETOMIDINE HYDROCHLORIDE 1.4 MCG/KG/HR: 100 INJECTION, SOLUTION, CONCENTRATE INTRAVENOUS at 11:03

## 2020-03-21 RX ADMIN — ENOXAPARIN SODIUM 40 MG: 100 INJECTION SUBCUTANEOUS at 08:03

## 2020-03-21 RX ADMIN — INSULIN DETEMIR 22 UNITS: 100 INJECTION, SOLUTION SUBCUTANEOUS at 08:03

## 2020-03-21 RX ADMIN — INSULIN ASPART 4 UNITS: 100 INJECTION, SOLUTION INTRAVENOUS; SUBCUTANEOUS at 09:03

## 2020-03-21 RX ADMIN — CHLORHEXIDINE GLUCONATE 0.12% ORAL RINSE 15 ML: 1.2 LIQUID ORAL at 09:03

## 2020-03-21 RX ADMIN — INSULIN ASPART 8 UNITS: 100 INJECTION, SOLUTION INTRAVENOUS; SUBCUTANEOUS at 05:03

## 2020-03-21 RX ADMIN — INSULIN ASPART 2 UNITS: 100 INJECTION, SOLUTION INTRAVENOUS; SUBCUTANEOUS at 08:03

## 2020-03-21 RX ADMIN — ENOXAPARIN SODIUM 40 MG: 100 INJECTION SUBCUTANEOUS at 09:03

## 2020-03-21 NOTE — CARE UPDATE
Called Patient's mother and given update about patient's condition. Patient's mother thought that she was getting Remdesevir but notified that she has not gotten the anti viral again. Gave report about her ventilator and her breathing status. Her glucose, removing fluid using lasix and restarted paralytic. Patient's mother expresses understanding. No other questions at present.

## 2020-03-21 NOTE — ASSESSMENT & PLAN NOTE
Home med includes lisinopril 20mg daily & lasix 40mg daily.     Plan:  - nicardipine drip to be weaned down  - Coreg 25mg BID, Q6h hydralazine 100mg scheduled on 3/21. Added Amlodipine 10 mg qd and Imdur qd on 3/22  - 40MG IV lasix Q6h, monitoring UOP closely.

## 2020-03-21 NOTE — ASSESSMENT & PLAN NOTE
Pt transferred from [facility] on 3/17 for ARDS 2/2 COVID 19. Pt was intubated on 3/15 & is on precedex, propofol, and fentanyl.   On transfer: , ferretin 368, D-Dimer >33  Pt continues to be febrile at 101    Plan:    - vent settings wean as tolerated   - continue methylprednisolone 40mg daily  - continue fentanyl and precedex- wean as tolerated hold Nimbex ( off since 09:00 on 3/21/20)      - Weaned off propofol; started Versed gtt due to hypertriglyceridemia   - Completed 3 days azithromycin  - f/u daily CRP  - Continuing plaquenil; waiting for approval of remdesevir

## 2020-03-21 NOTE — ASSESSMENT & PLAN NOTE
Pt intubated on 3/15, transferred from [facility] on 3/17 for ARDS 2/2 positive COVID 19.   On transfer: , ferritin 368, D-Dimer >33. CRP downtrending.   Patient has required paralysis and proning multiple times since admission.     Plan:  - vent settings wean as tolerated   - continue methylprednisolone 40mg daily  - wean propofol, fentanyl, precedex, versed  - Plaquenil daily   - Completed 3 days azithromycin  - f/u daily CRP, trending down   - Continuing plaquenil; waiting for approval of remdesevir  - A component of respiratory distress could be related to I/O status as patient is +9L, continue to diurese

## 2020-03-21 NOTE — PROGRESS NOTES
[facility]  Critical Care Medicine  Progress Note    Patient Name: Riri Gonsalves  MRN: [id number]  Admission Date: 3/17/2020  Hospital Length of Stay: 4 days  Code Status: Full Code  Attending Provider: Favio Sebastian MD  Primary Care Provider: Jocelynn Brooks MD   Principal Problem: Acute respiratory failure    Subjective:     HPI:  Pt is 29yoF w/ PMH DM2, HTN, and benign intracranial HTN who presented to [facility] on 3/13 complaining of shortness of breath, fevers, and cough. 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 According to notes from OSH pt had rhinorrhea & dry cough for several days- went to urgent care where she was told she had flu-like symptoms & was discharged home on amoxicillin. A few days later she was continuing to have fevers as high as 103 & was having worsening SOB at rest- went to [facility] ED & was initially feeling better on 2L NC. Labs on presentation were CRP 18.2, WBC 3.7, flu negative. Pt was admitted to medicine & started on empiric CAP coverage. Pt's condition continued to deteriorate- worsening leukopenia, lymphopenia, elevated LDH, & CRP, & increasing oxygen requirements. CXR showed worsening multifocal airspace disease. Pt was transferred to MICU on 3/14. Pt decompensated &was intubated on 3/15 & on 3/16 results came back positive for Covid 19. Echo showed normal LV function (EF 60%)Pt required fentanyl, precedex, and propofol. Pt also started on methylprednisolone. Pt was proned on 3/16. Pt was supinated prior to transfer.   Pt presents as transfer from [facility] ICU for possible ecmo.     Hospital/ICU Course:  Pt transferred from [facility] ICU on 3/17. Pt was pronated at 1745 on 3/17. Started cardine drip for hypertension & insulin drip for hyperglycemia. Pt supinated at 1100 on 3/18. Pt remains on precedex, and fentanyl.    Patient with hypertriglyceridemia, will wean propofol. Patient will start on Versed gtt and propofol gtt d/c'ed. Patient continue to be  volume up, patient started on scheduled 40mg Q6 IV lasix to euvolemic status. Still remains on insulin gtt for hyperglycemia, will attempt to transition today as patient BGL's controlled. Patient was started on detemir 22U BID yesterday. Will continue to monitor POCT glucose.     Interval History/Significant Events: No acute events overnight. Patient still on precedex 1.4, fentanyl 450, versed 5. Continues to wean down on cardene gtt. Coreg 25mg BID, scheduled hydralazine 100mg q8h. Patient on insulin gtt 4U for persistent hyperglycemia. , patient will not be proned at this time. Fever 100.8F overnight. WBC stable. Starting patient on propofol as she still remains to appear inadequately sedated. Will try to wean off other sedation. Continue to follow daily lactic acid levels, as patient had HTG on 3/17/20.    Review of Systems   Unable to perform ROS: Intubated     Objective:     Vital Signs (Most Recent):  Temp: 99.9 °F (37.7 °C) (03/21/20 0600)  Pulse: 80 (03/21/20 0727)  Resp: (!) 24 (03/21/20 0727)  BP: (!) 146/53 (03/19/20 1432)  SpO2: 99 % (03/21/20 0727) Vital Signs (24h Range):  Temp:  [99.4 °F (37.4 °C)-100.8 °F (38.2 °C)] 99.9 °F (37.7 °C)  Pulse:  [80-96] 80  Resp:  [24] 24  SpO2:  [90 %-99 %] 99 %  Arterial Line BP: (121-182)/(64-94) 159/79   Weight: (!) 149.6 kg (329 lb 12.9 oz)  Body mass index is 46 kg/m².      Intake/Output Summary (Last 24 hours) at 3/21/2020 0841  Last data filed at 3/21/2020 0600  Gross per 24 hour   Intake 5076.26 ml   Output 2735 ml   Net 2341.26 ml       Physical Exam   Patient was not examined due to +COVID positive status; defer to attending/fellow exam. Please refer to attestation.     Vents:  Vent Mode: A/C (03/21/20 0203)  Ventilator Initiated: Yes (03/17/20 1619)  Set Rate: 24 BPM (03/21/20 0203)  Vt Set: 380 mL (03/21/20 0203)  Pressure Support: 3 cmH20 (03/17/20 1700)  PEEP/CPAP: 10 cmH20 (03/21/20 0203)  Oxygen Concentration (%): 60 (03/21/20 0600)  Peak Airway  Pressure: 28 cmH2O (03/21/20 0203)  Plateau Pressure: 28 cmH20 (03/20/20 0325)  Total Ve: 9.13 mL (03/21/20 0203)  F/VT Ratio<105 (RSBI): (!) 62.99 (03/21/20 0203)  Lines/Drains/Airways     Central Venous Catheter Line            Percutaneous Central Line Insertion/Assessment - Triple Lumen  03/17/20 1100 right internal jugular 3 days          Drain                 NG/OG Tube 03/17/20  Right mouth 4 days         Urethral Catheter 03/17/20 1200 3 days          Airway                 Airway - Non-Surgical -- days          Arterial Line                 Arterial Line 03/18/20 1344 Right Radial 2 days          Peripheral Intravenous Line                 Peripheral IV - Single Lumen 03/17/20  Anterior;Distal;Right Forearm 4 days         Peripheral IV - Single Lumen 03/17/20 Anterior;Proximal;Right Forearm 4 days              Significant Labs:    CBC/Anemia Profile:  Recent Labs   Lab 03/20/20  0300 03/21/20  0300   WBC 4.57 5.54   HGB 10.4* 10.5*   HCT 36.0* 36.7*    304   MCV 88 88   RDW 14.0 14.0        Chemistries:  Recent Labs   Lab 03/20/20  0300 03/21/20  0300   * 143   K 4.4 4.0    103   CO2 26 32*   BUN 15 17   CREATININE 0.7 0.8   CALCIUM 7.8* 8.3*   ALBUMIN 2.0* 2.3*   PROT 6.3 6.6   BILITOT 0.3 0.3   ALKPHOS 58 55   ALT 13 12   AST 18 15   MG 2.1 2.2   PHOS 3.3 3.5         Significant Imaging:  I have reviewed all pertinent imaging results/findings within the past 24 hours.      ABG  Recent Labs   Lab 03/21/20  0318   PH 7.398   PO2 93   PCO2 57.3*   HCO3 35.3*   BE 10     Assessment/Plan:     Pulmonary  * Acute respiratory failure with CoVID19 +  Pt transferred from [facility] on 3/17 for ARDS 2/2 COVID 19. Pt was intubated on 3/15.   Patient is on precedex, propofol, fentanyl, and versed. Started on Nimbex again. Propofol added on with goal to wean alternative sedation as patient maxed out     On transfer: , ferritin 368, D-Dimer >33  Pt continues to be febrile at 100.8    Plan:  - vent  settings wean as tolerated   - continue methylprednisolone 40mg daily  - continue fentanyl and precedex, Nimbex (off since 09:00 on 3/21/20; restarted at around 3pm today)       - As patient maxed out on sedation; could consider Ketamine if patient continues to be difficulty to control on fentanyl, propofol, versed, precedex  - Plaquenil daily   - Completed 3 days azithromycin  - f/u daily CRP, trending down   - Continuing plaquenil; waiting for approval of remdesevir    Cardiac/Vascular  Essential hypertension  Home med includes lisinopril 20mg daily & lasix 40mg daily.     Plan:  - nicardipine drip, will ask pharmacy to concentrated cardine gtt. Patient to be weaned down off of gtt  - Coreg 25mg BID  - Q6h hydralazine 100mg scheduled  - Patient net fluid positive 6L, schedule 40MG IV lasix Q6h     Endocrine  Type 2 diabetes mellitus without complication, without long-term current use of insulin  Pt has A1c 12.4 (3/17/20) & home med is metformin 500mg BID.     Plan:  Required 174U total insulin  - On insulin gtt  - accuchecks Q4h    - Total insulin in 24h period 164U wean off insult gtt  - on Detemir 30U BID, will transition off gtt once patient's BGL<200. Nursing to call team when BGLs drop below 200 to d/c gtt. Patient has received Long-acting insulin earlier in the AM  - [facility]     Critical Care Daily Checklist:    A: Awake: RASS Goal/Actual Goal: RASS Goal: -4-->deep sedation  Actual: [place] Agitation Sedation Scale (RASS): Moderate sedation   B: Spontaneous Breathing Trial Performed? Spon. Breathing Trial Initiated?: Not initiated (03/20/20 0903)   C: SAT & SBT Coordinated?  Not at this point                  D: Delirium: CAM-ICU Overall CAM-ICU: Positive   E: Early Mobility Performed? Yes   F: Feeding Goal: Goals: Meet % EEN, EPN by RD f/u date  Status: Nutrition Goal Status: new   Current Diet Order   Procedures    Diet NPO      AS: Analgesia/Sedation Precedex 1.4  Fentanyl 450  Versed 5   T:  Thromboembolic Prophylaxis Enoxaparin 40mg   H: HOB > 300 Yes   U: Stress Ulcer Prophylaxis (if needed) Famotidine 20mg   G: Glucose Control Insulin gtt 4U/hr, Detemir 22U BID   B: Bowel Function Stool Occurrence: 0   I: Indwelling Catheter (Lines & Saxena) Necessity Urinary catheter 3/17/20  Peripheral IV X2  RIJ trialysis  Intubated   D: De-escalation of Antimicrobials/Pharmacotherapies ---    Plan for the day/ETD - Wean sedation as tolerated  - Transition off insulin gtt to subq  - Speak with family    Code Status:  Family/Goals of Care: Full Code         Critical secondary to COVID +, respiratory failure     Critical care was time spent personally by me on the following activities: development of treatment plan with patient or surrogate and bedside caregivers, discussions with consultants, evaluation of patient's response to treatment, examination of patient, ordering and performing treatments and interventions, ordering and review of laboratory studies, ordering and review of radiographic studies, pulse oximetry, re-evaluation of patient's condition. This critical care time did not overlap with that of any other provider or involve time for any procedures.     Rosalia Pride MD  Critical Care Medicine  [facility]

## 2020-03-21 NOTE — ASSESSMENT & PLAN NOTE
Pt has A1c 12.4 (3/17/20) & home med is metformin 500mg BID.     Plan:  - insulin drip at 4U +[facility]SS[facility]  - accuchecks Q4  - Will transition patient off insulin gtt once BGL <250,   - Total insulin in 24h period 164U wean off insult gtt  - on  Detemir 22U BID, will transition off gtt as tolerated  - [facility]

## 2020-03-21 NOTE — EICU
Maria Del Rosario Riggs, RN   Registered Nurse   Intensive Care                             Rounding (Video Assessment):  Yes     Intervention Initiated From:  COR / EICU        Comments: pt resting quietly NAD observed. Pt maintained on Precedex, Fentanyl, and Versed drips. Fio2 on vent is 50%.

## 2020-03-21 NOTE — PROGRESS NOTES
Dr. Johnson and Dr. Comer called to bedside by RN for patient's asynchrony on vent and desaturation despite being maxed on Fentanyl, Versed, and Precedex gtts. MDs stated to restart Nimbex gtt. Cardene gtt paused for goal SBP < 180 per a-line. Pt. still appears to be asleep, only opens eyes to vigorous stimulation. Will carry out orders and continue to monitor.

## 2020-03-21 NOTE — ASSESSMENT & PLAN NOTE
Home med includes lisinopril 20mg daily & lasix 40mg daily.     Plan:  - nicardipine drip, will ask pharmacy to concentrated cardine gtt. Patient to be weaned down off of gtt  - Coreg 25mg BID  - Q6h hydralazine 100mg scheduled  - Patient net fluid positive 6L, schedule 40MG IV lasix Q6h

## 2020-03-21 NOTE — SUBJECTIVE & OBJECTIVE
Interval History: intubated and sedated desat overnight 60% now down to 50% FiO2 PEEP 10. Back on cardene drip. , no pressors    Review of Systems  Objective:     Vital Signs (Most Recent):  Temp: 99.3 °F (37.4 °C) (03/21/20 1500)  Pulse: 87 (03/21/20 1500)  Resp: (!) 24 (03/21/20 1500)  BP: (!) 146/53 (03/19/20 1432)  SpO2: (!) 90 % (03/21/20 1500) Vital Signs (24h Range):  Temp:  [98.8 °F (37.1 °C)-100.8 °F (38.2 °C)] 99.3 °F (37.4 °C)  Pulse:  [79-96] 87  Resp:  [24] 24  SpO2:  [90 %-99 %] 90 %  Arterial Line BP: (121-182)/(73-94) 181/89     Weight: (!) 149.6 kg (329 lb 12.9 oz)  Body mass index is 46 kg/m².    Estimated Creatinine Clearance: 170.6 mL/min (based on SCr of 0.8 mg/dL).    Physical Exam   Nursing note and vitals reviewed.  Patient not examined because of CoVID19 isolation to limit exposure and use of PPE, discussed with RN      Significant Labs:   CBC:   Recent Labs   Lab 03/20/20  0300 03/21/20  0300   WBC 4.57 5.54   HGB 10.4* 10.5*   HCT 36.0* 36.7*    304     CMP:   Recent Labs   Lab 03/20/20  0300 03/21/20  0300   * 143   K 4.4 4.0    103   CO2 26 32*   * 218*   BUN 15 17   CREATININE 0.7 0.8   CALCIUM 7.8* 8.3*   PROT 6.3 6.6   ALBUMIN 2.0* 2.3*   BILITOT 0.3 0.3   ALKPHOS 58 55   AST 18 15   ALT 13 12   ANIONGAP 11 8   EGFRNONAA >60.0 >60.0     Microbiology Results (last 7 days)     Procedure Component Value Units Date/Time    Culture, Respiratory with Gram Stain [[id number]] Collected:  03/20/20 0119    Order Status:  Completed Specimen:  Respiratory from Endotracheal Aspirate Updated:  03/21/20 0800     Respiratory Culture Normal respiratory prasanth     Gram Stain (Respiratory) <10 epithelial cells per low power field.     Gram Stain (Respiratory) Rare WBC's     Gram Stain (Respiratory) No organisms seen    Blood culture [[id number]] Collected:  03/19/20 2300    Order Status:  Completed Specimen:  Blood from Peripheral, Hand, Left Updated:  03/21/20 0613     Blood  Culture, Routine No Growth to date      No Growth to date    Blood culture [[id number]] Collected:  03/19/20 2300    Order Status:  Completed Specimen:  Blood from Peripheral, Forearm, Left Updated:  03/21/20 0613     Blood Culture, Routine No Growth to date      No Growth to date    Culture, Respiratory with Gram Stain [[id number]] Collected:  03/19/20 2201    Order Status:  Canceled Specimen:  Respiratory from Tracheal Aspirate     Influenza A & B by Molecular [[id number]] Collected:  03/17/20 1613    Order Status:  Completed Specimen:  Nasopharyngeal Swab Updated:  03/17/20 1654     Influenza A, Molecular Negative     Influenza B, Molecular Negative     Flu A & B Source np        All pertinent labs within the past 24 hours have been reviewed.    Significant Imaging: I have reviewed all pertinent imaging results/findings within the past 24 hours.

## 2020-03-21 NOTE — NURSING
CMICU DAILY GOALS       A: Awake    RASS: Goal - RASS Goal: -5-->unarousable  Actual - RASS ([place] Agitation-Sedation Scale): -4-->deep sedation   Restraint necessity: Clinical Justification: Treatment Interference, Climbing out of bed, Removing medical devices  B: Breath   SBT: Not attempted   C: Coordinate A & B, analgesics/sedatives   Pain: managed    SAT: Not attempted  D: Delirium   CAM-ICU: Overall CAM-ICU: Positive  E: Early Mobility   MOVE Screen: Fail   Activity: Activity Management: bedrest maintained per order  FAS: Feeding/Nutrition   Diet order: Diet/Nutrition Received: NPO, tube feeding,   Fluid restriction:    T: Thrombus   DVT prophylaxis: VTE Required Core Measure: Pharmacological prophylaxis initiated/maintained  H: HOB Elevation   Head of Bed (HOB): HOB at 30-45 degrees  U: Ulcer Prophylaxis   GI: yes  G: Glucose control   managed Glycemic Management: blood glucose monitoring  S: Skin   Bundle compliance: yes   Bathing/Skin Care: bath, chlorhexidine, bath, partial, dressed/undressed, linen changed Date: day bath  B: Bowel Function   no issues   I: Indwelling Catheters   Saxena necessity:      Urethral Catheter 03/17/20 1200-Reason for Continuing Urinary Catheterization: Critically ill in ICU requiring intensive monitoring   CVC necessity: Yes   IPAD offered: Not appropriate  D: De-escalation Antibx   No  Plan for the day   Continue to monitor  Family/Goals of care/Code Status   Code Status: Full Code     No acute events throughout day, VS and assessment per flow sheet, patient progressing towards goals as tolerated, plan of care reviewed with Riri Gonsalves and family, all concerns addressed, will continue to monitor.

## 2020-03-21 NOTE — SUBJECTIVE & OBJECTIVE
Interval History/Significant Events: No acute events overnight. Patient still on precedex 1.4, fentanyl 450, versed 5. Continues to wean down on cardene gtt. Coreg 25mg BID, scheduled hydralazine 100mg q8h. Patient on insulin gtt 4U for persistent hyperglycemia. , patient will not be proned at this time.    Review of Systems   Unable to perform ROS: Intubated     Objective:     Vital Signs (Most Recent):  Temp: 99.9 °F (37.7 °C) (03/21/20 0600)  Pulse: 80 (03/21/20 0727)  Resp: (!) 24 (03/21/20 0727)  BP: (!) 146/53 (03/19/20 1432)  SpO2: 99 % (03/21/20 0727) Vital Signs (24h Range):  Temp:  [99.4 °F (37.4 °C)-100.8 °F (38.2 °C)] 99.9 °F (37.7 °C)  Pulse:  [80-96] 80  Resp:  [24] 24  SpO2:  [90 %-99 %] 99 %  Arterial Line BP: (121-182)/(64-94) 159/79   Weight: (!) 149.6 kg (329 lb 12.9 oz)  Body mass index is 46 kg/m².      Intake/Output Summary (Last 24 hours) at 3/21/2020 0841  Last data filed at 3/21/2020 0600  Gross per 24 hour   Intake 5076.26 ml   Output 2735 ml   Net 2341.26 ml       Physical Exam   Patient was not examined due to +COVID positive status; defer to attending/fellow exam. Please refer to attestation.     Vents:  Vent Mode: A/C (03/21/20 0203)  Ventilator Initiated: Yes (03/17/20 1619)  Set Rate: 24 BPM (03/21/20 0203)  Vt Set: 380 mL (03/21/20 0203)  Pressure Support: 3 cmH20 (03/17/20 1700)  PEEP/CPAP: 10 cmH20 (03/21/20 0203)  Oxygen Concentration (%): 60 (03/21/20 0600)  Peak Airway Pressure: 28 cmH2O (03/21/20 0203)  Plateau Pressure: 28 cmH20 (03/20/20 0325)  Total Ve: 9.13 mL (03/21/20 0203)  F/VT Ratio<105 (RSBI): (!) 62.99 (03/21/20 0203)  Lines/Drains/Airways     Central Venous Catheter Line            Percutaneous Central Line Insertion/Assessment - Triple Lumen  03/17/20 1100 right internal jugular 3 days          Drain                 NG/OG Tube 03/17/20  Right mouth 4 days         Urethral Catheter 03/17/20 1200 3 days          Airway                 Airway - Non-Surgical --  days          Arterial Line                 Arterial Line 03/18/20 1344 Right Radial 2 days          Peripheral Intravenous Line                 Peripheral IV - Single Lumen 03/17/20  Anterior;Distal;Right Forearm 4 days         Peripheral IV - Single Lumen 03/17/20 Anterior;Proximal;Right Forearm 4 days              Significant Labs:    CBC/Anemia Profile:  Recent Labs   Lab 03/20/20  0300 03/21/20  0300   WBC 4.57 5.54   HGB 10.4* 10.5*   HCT 36.0* 36.7*    304   MCV 88 88   RDW 14.0 14.0        Chemistries:  Recent Labs   Lab 03/20/20  0300 03/21/20  0300   * 143   K 4.4 4.0    103   CO2 26 32*   BUN 15 17   CREATININE 0.7 0.8   CALCIUM 7.8* 8.3*   ALBUMIN 2.0* 2.3*   PROT 6.3 6.6   BILITOT 0.3 0.3   ALKPHOS 58 55   ALT 13 12   AST 18 15   MG 2.1 2.2   PHOS 3.3 3.5         Significant Imaging:  I have reviewed all pertinent imaging results/findings within the past 24 hours.

## 2020-03-21 NOTE — ASSESSMENT & PLAN NOTE
Pt has A1c 12.4 (3/17/20) & home med is metformin 500mg BID.     Plan:  Required 174U total insulin  - On insulin gtt  - accuchecks Q4h    - Detemir 30U BID, will transition off gtt once patient's BGL<200. Nursing to call team when BGLs drop below 200 to d/c gtt  - MDSSI

## 2020-03-21 NOTE — PROGRESS NOTES
Dr. Sebastian at bedside for patient rounds. MD stated to start propofol gtt in efforts to turn off Nimbex gtt and decrease other sedative gtts. While controlling SpO2 and blood pressure.

## 2020-03-21 NOTE — EICU
Rounding (Video Assessment):  Yes    Intervention Initiated From:  COR / EICU      Comments: pt resting quietly NAD observed. Pt maintained on Precedex, Fentanyl, and Versed drips. Fio2 on vent is 50%.

## 2020-03-21 NOTE — PROGRESS NOTES
[facility]  Infectious Disease  Progress Note    Patient Name: Riri Gonsalves  MRN: [id number]  Admission Date: 3/17/2020  Length of Stay: 4 days  Attending Physician: Favio Sebastian MD  Primary Care Provider: Jocelynn Brooks MD    Isolation Status: Airborne and Contact and Droplet  Assessment/Plan:      * Acute respiratory failure with CoVID19 +  Pt is 29yoF w/ PMH DM2, HTN, and benign intracranial HTN transferred to [facility] on 3/17 for possible ECMOAs per chart review and info from OSH pt had rhinorrhea & dry cough for several days- went to urgent care- was discharged home on amoxicillin. A few days later she was continuing to have fevers as high as 103 & was having worsening SOB at rest- went to [facility]  3/13 with SOB, fevers, and cough.   Was on 2L oxygen. Labs on presentation were CRP 18.2, WBC 3.7, flu negative. Pt was admitted to medicine & started on empiric CAP coverage. Pt's condition continued to deteriorate- worsening leukopenia, lymphopenia, elevated LDH, & CRP, & increasing oxygen requirements. CXR showed worsening multifocal airspace disease. Pt in ARDS. Pt was transferred to MICU on 3/14. Pt decompensated & was intubated on 3/15 & on 3/16 results came back positive for Covid 19. Echo showed normal LV function (EF 60%). Pt was also started on methylprednisolone. 3/17 transferred to [facility]. ID consulted for COVID 19 management.   Hydroxychloroquine started 3/18, CFTX for 3 days; is also on azithromycin    Recommendations  --Continue Hydroxychloroquine with Azithro, monitor QTc closely  -- Continue COVID 19 isolation protocol, limit visitors  -- Remdesivir compassionate use application in process        Case discussed with Dr Egan    Thank you for your consult. I will follow-up with patient. Please contact us if you have any additional questions.    Floyd Witt MD   ID Fellow  Infectious Disease  [facility]    Subjective:     Principal Problem:Acute  respiratory failure    HPI: History obtained from review of chart.  Pt is 30 y/o female w/ PMH DM2, HTN, and benign intracranial HTN who presented to [facility] on 3/13 complaining of shortness of breath, fevers, and cough X several days.  Prior to her presentation, she had been treated with amoxicillin with limited benefit.    She was admitted at [facility]. Pt's condition continued to deteriorate- worsening leukopenia, lymphopenia, elevated LDH, & CRP, & increasing oxygen requirements. CXR showed worsening multifocal airspace disease. Pt was transferred to MICU on 3/14. Pt decompensated &was intubated on 3/15 & on 3/16 results came back positive for Covid 19. Pt presents as transfer from [facility] ICU for possible ecmo.  Interval History: intubated and sedated desat overnight 60% now down to 50% FiO2 PEEP 10. Back on cardene drip. , no pressors    Review of Systems  Objective:     Vital Signs (Most Recent):  Temp: 99.3 °F (37.4 °C) (03/21/20 1500)  Pulse: 87 (03/21/20 1500)  Resp: (!) 24 (03/21/20 1500)  BP: (!) 146/53 (03/19/20 1432)  SpO2: (!) 90 % (03/21/20 1500) Vital Signs (24h Range):  Temp:  [98.8 °F (37.1 °C)-100.8 °F (38.2 °C)] 99.3 °F (37.4 °C)  Pulse:  [79-96] 87  Resp:  [24] 24  SpO2:  [90 %-99 %] 90 %  Arterial Line BP: (121-182)/(73-94) 181/89     Weight: (!) 149.6 kg (329 lb 12.9 oz)  Body mass index is 46 kg/m².    Estimated Creatinine Clearance: 170.6 mL/min (based on SCr of 0.8 mg/dL).    Physical Exam   Nursing note and vitals reviewed.  Patient not examined because of CoVID19 isolation to limit exposure and use of PPE, discussed with RN      Significant Labs:   CBC:   Recent Labs   Lab 03/20/20  0300 03/21/20  0300   WBC 4.57 5.54   HGB 10.4* 10.5*   HCT 36.0* 36.7*    304     CMP:   Recent Labs   Lab 03/20/20  0300 03/21/20  0300   * 143   K 4.4 4.0    103   CO2 26 32*   * 218*   BUN 15 17   CREATININE 0.7 0.8   CALCIUM 7.8* 8.3*   PROT 6.3 6.6   ALBUMIN 2.0* 2.3*   BILITOT 0.3  0.3   ALKPHOS 58 55   AST 18 15   ALT 13 12   ANIONGAP 11 8   EGFRNONAA >60.0 >60.0     Microbiology Results (last 7 days)     Procedure Component Value Units Date/Time    Culture, Respiratory with Gram Stain [[id number]] Collected:  03/20/20 0119    Order Status:  Completed Specimen:  Respiratory from Endotracheal Aspirate Updated:  03/21/20 0800     Respiratory Culture Normal respiratory prasanth     Gram Stain (Respiratory) <10 epithelial cells per low power field.     Gram Stain (Respiratory) Rare WBC's     Gram Stain (Respiratory) No organisms seen    Blood culture [[id number]] Collected:  03/19/20 2300    Order Status:  Completed Specimen:  Blood from Peripheral, Hand, Left Updated:  03/21/20 0613     Blood Culture, Routine No Growth to date      No Growth to date    Blood culture [[id number]] Collected:  03/19/20 2300    Order Status:  Completed Specimen:  Blood from Peripheral, Forearm, Left Updated:  03/21/20 0613     Blood Culture, Routine No Growth to date      No Growth to date    Culture, Respiratory with Gram Stain [[id number]] Collected:  03/19/20 2201    Order Status:  Canceled Specimen:  Respiratory from Tracheal Aspirate     Influenza A & B by Molecular [[id number]] Collected:  03/17/20 1613    Order Status:  Completed Specimen:  Nasopharyngeal Swab Updated:  03/17/20 1654     Influenza A, Molecular Negative     Influenza B, Molecular Negative     Flu A & B Source np        All pertinent labs within the past 24 hours have been reviewed.    Significant Imaging: I have reviewed all pertinent imaging results/findings within the past 24 hours.

## 2020-03-21 NOTE — SUBJECTIVE & OBJECTIVE
Interval History: intubated and sedated 30% FiO2 and PEEP 5, not on pressors    Review of Systems   Unable to perform ROS: Acuity of condition     Objective:     Vital Signs (Most Recent):  Temp: 99.3 °F (37.4 °C) (03/21/20 1500)  Pulse: 87 (03/21/20 1500)  Resp: (!) 24 (03/21/20 1500)  BP: (!) 146/53 (03/19/20 1432)  SpO2: (!) 90 % (03/21/20 1500) Vital Signs (24h Range):  Temp:  [98.8 °F (37.1 °C)-100.8 °F (38.2 °C)] 99.3 °F (37.4 °C)  Pulse:  [79-96] 87  Resp:  [24] 24  SpO2:  [90 %-99 %] 90 %  Arterial Line BP: (121-182)/(73-94) 181/89     Weight: (!) 149.6 kg (329 lb 12.9 oz)  Body mass index is 46 kg/m².    Estimated Creatinine Clearance: 170.6 mL/min (based on SCr of 0.8 mg/dL).    Physical Exam   Nursing note and vitals reviewed.  Patient not examined because of CoVID19 isolation to limit exposure and use of PPE, discussed with RN - no new findings as of 3-22-20      Significant Labs:   CBC:   Recent Labs   Lab 03/20/20  0300 03/21/20  0300   WBC 4.57 5.54   HGB 10.4* 10.5*   HCT 36.0* 36.7*    304     CMP:   Recent Labs   Lab 03/20/20  0300 03/21/20  0300   * 143   K 4.4 4.0    103   CO2 26 32*   * 218*   BUN 15 17   CREATININE 0.7 0.8   CALCIUM 7.8* 8.3*   PROT 6.3 6.6   ALBUMIN 2.0* 2.3*   BILITOT 0.3 0.3   ALKPHOS 58 55   AST 18 15   ALT 13 12   ANIONGAP 11 8   EGFRNONAA >60.0 >60.0     Microbiology Results (last 7 days)     Procedure Component Value Units Date/Time    Culture, Respiratory with Gram Stain [[id number]] Collected:  03/20/20 0119    Order Status:  Completed Specimen:  Respiratory from Endotracheal Aspirate Updated:  03/21/20 0800     Respiratory Culture Normal respiratory prasanth     Gram Stain (Respiratory) <10 epithelial cells per low power field.     Gram Stain (Respiratory) Rare WBC's     Gram Stain (Respiratory) No organisms seen    Blood culture [[id number]] Collected:  03/19/20 2300    Order Status:  Completed Specimen:  Blood from Peripheral, Hand, Left  Updated:  03/21/20 0613     Blood Culture, Routine No Growth to date      No Growth to date    Blood culture [[id number]] Collected:  03/19/20 2300    Order Status:  Completed Specimen:  Blood from Peripheral, Forearm, Left Updated:  03/21/20 0613     Blood Culture, Routine No Growth to date      No Growth to date    Culture, Respiratory with Gram Stain [[id number]] Collected:  03/19/20 2201    Order Status:  Canceled Specimen:  Respiratory from Tracheal Aspirate     Influenza A & B by Molecular [[id number]] Collected:  03/17/20 1613    Order Status:  Completed Specimen:  Nasopharyngeal Swab Updated:  03/17/20 1654     Influenza A, Molecular Negative     Influenza B, Molecular Negative     Flu A & B Source np        All pertinent labs within the past 24 hours have been reviewed.    Significant Imaging: I have reviewed all pertinent imaging results/findings within the past 24 hours.

## 2020-03-21 NOTE — ASSESSMENT & PLAN NOTE
Pt is 29yoF w/ PMH DM2, HTN, and benign intracranial HTN transferred to [facility] on 3/17 for possible ECMOAs per chart review and info from OSH pt had rhinorrhea & dry cough for several days- went to urgent care- was discharged home on amoxicillin. A few days later she was continuing to have fevers as high as 103 & was having worsening SOB at rest- went to [facility]  3/13 with SOB, fevers, and cough.   Was on 2L oxygen. Labs on presentation were CRP 18.2, WBC 3.7, flu negative. Pt was admitted to medicine & started on empiric CAP coverage. Pt's condition continued to deteriorate- worsening leukopenia, lymphopenia, elevated LDH, & CRP, & increasing oxygen requirements. CXR showed worsening multifocal airspace disease. Pt in ARDS. Pt was transferred to MICU on 3/14. Pt decompensated & was intubated on 3/15 & on 3/16 results came back positive for Covid 19. Echo showed normal LV function (EF 60%). Pt was also started on methylprednisolone. 3/17 transferred to [facility]. ID consulted for COVID 19 management.   Hydroxychloroquine started 3/18, CFTX for 3 days; is also on azithromycin    Recommendations  --Continue Hydroxychloroquine with Azithro, monitor QTc closely  -- Continue COVID 19 isolation protocol, limit visitors  -- Remdesivir compassionate use application in process

## 2020-03-22 LAB
ALBUMIN SERPL BCP-MCNC: 2.4 G/DL (ref 3.5–5.2)
ALLENS TEST: ABNORMAL
ALP SERPL-CCNC: 54 U/L (ref 55–135)
ALT SERPL W/O P-5'-P-CCNC: 14 U/L (ref 10–44)
ANION GAP SERPL CALC-SCNC: 10 MMOL/L (ref 8–16)
ANION GAP SERPL CALC-SCNC: 13 MMOL/L (ref 8–16)
AST SERPL-CCNC: 15 U/L (ref 10–40)
BACTERIA #/AREA URNS AUTO: NORMAL /HPF
BASOPHILS # BLD AUTO: 0.02 K/UL (ref 0–0.2)
BASOPHILS NFR BLD: 0.4 % (ref 0–1.9)
BILIRUB SERPL-MCNC: 0.4 MG/DL (ref 0.1–1)
BILIRUB UR QL STRIP: NEGATIVE
BUN SERPL-MCNC: 22 MG/DL (ref 6–20)
BUN SERPL-MCNC: 32 MG/DL (ref 6–20)
CALCIUM SERPL-MCNC: 8.7 MG/DL (ref 8.7–10.5)
CALCIUM SERPL-MCNC: 8.8 MG/DL (ref 8.7–10.5)
CHLORIDE SERPL-SCNC: 98 MMOL/L (ref 95–110)
CHLORIDE SERPL-SCNC: 98 MMOL/L (ref 95–110)
CLARITY UR REFRACT.AUTO: CLEAR
CO2 SERPL-SCNC: 28 MMOL/L (ref 23–29)
CO2 SERPL-SCNC: 29 MMOL/L (ref 23–29)
COLOR UR AUTO: YELLOW
CREAT SERPL-MCNC: 0.8 MG/DL (ref 0.5–1.4)
CREAT SERPL-MCNC: 1.1 MG/DL (ref 0.5–1.4)
CRP SERPL-MCNC: 20.9 MG/L (ref 0–8.2)
DELSYS: ABNORMAL
DIFFERENTIAL METHOD: ABNORMAL
EOSINOPHIL # BLD AUTO: 0 K/UL (ref 0–0.5)
EOSINOPHIL NFR BLD: 0.2 % (ref 0–8)
ERYTHROCYTE [DISTWIDTH] IN BLOOD BY AUTOMATED COUNT: 13.6 % (ref 11.5–14.5)
ERYTHROCYTE [SEDIMENTATION RATE] IN BLOOD BY WESTERGREN METHOD: 22 MM/H
EST. GFR  (AFRICAN AMERICAN): >60 ML/MIN/1.73 M^2
EST. GFR  (AFRICAN AMERICAN): >60 ML/MIN/1.73 M^2
EST. GFR  (NON AFRICAN AMERICAN): >60 ML/MIN/1.73 M^2
EST. GFR  (NON AFRICAN AMERICAN): >60 ML/MIN/1.73 M^2
FIO2: 40
GLUCOSE SERPL-MCNC: 338 MG/DL (ref 70–110)
GLUCOSE SERPL-MCNC: 376 MG/DL (ref 70–110)
GLUCOSE UR QL STRIP: ABNORMAL
HCO3 UR-SCNC: 33.5 MMOL/L (ref 24–28)
HCT VFR BLD AUTO: 37.5 % (ref 37–48.5)
HGB BLD-MCNC: 11.1 G/DL (ref 12–16)
HGB UR QL STRIP: NEGATIVE
IMM GRANULOCYTES # BLD AUTO: 0.17 K/UL (ref 0–0.04)
IMM GRANULOCYTES NFR BLD AUTO: 3.3 % (ref 0–0.5)
KETONES UR QL STRIP: NEGATIVE
LACTATE SERPL-SCNC: 0.9 MMOL/L (ref 0.5–2.2)
LEUKOCYTE ESTERASE UR QL STRIP: NEGATIVE
LYMPHOCYTES # BLD AUTO: 1.1 K/UL (ref 1–4.8)
LYMPHOCYTES NFR BLD: 20.4 % (ref 18–48)
MAGNESIUM SERPL-MCNC: 2.2 MG/DL (ref 1.6–2.6)
MAGNESIUM SERPL-MCNC: 2.3 MG/DL (ref 1.6–2.6)
MCH RBC QN AUTO: 25.6 PG (ref 27–31)
MCHC RBC AUTO-ENTMCNC: 29.6 G/DL (ref 32–36)
MCV RBC AUTO: 87 FL (ref 82–98)
MICROSCOPIC COMMENT: NORMAL
MIN VOL: 9.89
MODE: ABNORMAL
MONOCYTES # BLD AUTO: 0.4 K/UL (ref 0.3–1)
MONOCYTES NFR BLD: 8.2 % (ref 4–15)
NEUTROPHILS # BLD AUTO: 3.5 K/UL (ref 1.8–7.7)
NEUTROPHILS NFR BLD: 67.5 % (ref 38–73)
NITRITE UR QL STRIP: NEGATIVE
NRBC BLD-RTO: 0 /100 WBC
PCO2 BLDA: 41.2 MMHG (ref 35–45)
PEEP: 12
PH SMN: 7.52 [PH] (ref 7.35–7.45)
PH UR STRIP: 6 [PH] (ref 5–8)
PHOSPHATE SERPL-MCNC: 4.1 MG/DL (ref 2.7–4.5)
PIP: 34
PLATELET # BLD AUTO: 311 K/UL (ref 150–350)
PMV BLD AUTO: 10.1 FL (ref 9.2–12.9)
PO2 BLDA: 65 MMHG (ref 80–100)
POC BE: 11 MMOL/L
POC SATURATED O2: 94 % (ref 95–100)
POC TCO2: 35 MMOL/L (ref 23–27)
POCT GLUCOSE: 298 MG/DL (ref 70–110)
POCT GLUCOSE: 316 MG/DL (ref 70–110)
POCT GLUCOSE: 341 MG/DL (ref 70–110)
POCT GLUCOSE: 347 MG/DL (ref 70–110)
POCT GLUCOSE: 348 MG/DL (ref 70–110)
POCT GLUCOSE: 375 MG/DL (ref 70–110)
POTASSIUM SERPL-SCNC: 4.1 MMOL/L (ref 3.5–5.1)
POTASSIUM SERPL-SCNC: 4.3 MMOL/L (ref 3.5–5.1)
PROT SERPL-MCNC: 6.7 G/DL (ref 6–8.4)
PROT UR QL STRIP: NEGATIVE
RBC # BLD AUTO: 4.33 M/UL (ref 4–5.4)
RBC #/AREA URNS AUTO: 1 /HPF (ref 0–4)
SAMPLE: ABNORMAL
SITE: ABNORMAL
SODIUM SERPL-SCNC: 137 MMOL/L (ref 136–145)
SODIUM SERPL-SCNC: 139 MMOL/L (ref 136–145)
SP GR UR STRIP: 1.01 (ref 1–1.03)
SP02: 93
URN SPEC COLLECT METH UR: ABNORMAL
VT: 450
WBC # BLD AUTO: 5.14 K/UL (ref 3.9–12.7)
WBC #/AREA URNS AUTO: 5 /HPF (ref 0–5)
YEAST UR QL AUTO: NORMAL

## 2020-03-22 PROCEDURE — 99233 PR SUBSEQUENT HOSPITAL CARE,LEVL III: ICD-10-PCS | Mod: ,,, | Performed by: INTERNAL MEDICINE

## 2020-03-22 PROCEDURE — 99900026 HC AIRWAY MAINTENANCE (STAT)

## 2020-03-22 PROCEDURE — 36556 INSERT NON-TUNNEL CV CATH: CPT

## 2020-03-22 PROCEDURE — 25000003 PHARM REV CODE 250: Performed by: STUDENT IN AN ORGANIZED HEALTH CARE EDUCATION/TRAINING PROGRAM

## 2020-03-22 PROCEDURE — 99291 PR CRITICAL CARE, E/M 30-74 MINUTES: ICD-10-PCS | Mod: ,,, | Performed by: INTERNAL MEDICINE

## 2020-03-22 PROCEDURE — 80053 COMPREHEN METABOLIC PANEL: CPT

## 2020-03-22 PROCEDURE — 63600175 PHARM REV CODE 636 W HCPCS: Performed by: STUDENT IN AN ORGANIZED HEALTH CARE EDUCATION/TRAINING PROGRAM

## 2020-03-22 PROCEDURE — 94003 VENT MGMT INPAT SUBQ DAY: CPT

## 2020-03-22 PROCEDURE — 27000221 HC OXYGEN, UP TO 24 HOURS

## 2020-03-22 PROCEDURE — 99900035 HC TECH TIME PER 15 MIN (STAT)

## 2020-03-22 PROCEDURE — 37799 UNLISTED PX VASCULAR SURGERY: CPT

## 2020-03-22 PROCEDURE — 43752 NASAL/OROGASTRIC W/TUBE PLMT: CPT

## 2020-03-22 PROCEDURE — 20000000 HC ICU ROOM

## 2020-03-22 PROCEDURE — C1751 CATH, INF, PER/CENT/MIDLINE: HCPCS

## 2020-03-22 PROCEDURE — 36620 INSERTION CATHETER ARTERY: CPT

## 2020-03-22 PROCEDURE — 63600175 PHARM REV CODE 636 W HCPCS: Performed by: INTERNAL MEDICINE

## 2020-03-22 PROCEDURE — 94761 N-INVAS EAR/PLS OXIMETRY MLT: CPT

## 2020-03-22 PROCEDURE — 83605 ASSAY OF LACTIC ACID: CPT

## 2020-03-22 PROCEDURE — 82803 BLOOD GASES ANY COMBINATION: CPT

## 2020-03-22 PROCEDURE — 81001 URINALYSIS AUTO W/SCOPE: CPT

## 2020-03-22 PROCEDURE — 85025 COMPLETE CBC W/AUTO DIFF WBC: CPT

## 2020-03-22 PROCEDURE — 25000242 PHARM REV CODE 250 ALT 637 W/ HCPCS: Performed by: STUDENT IN AN ORGANIZED HEALTH CARE EDUCATION/TRAINING PROGRAM

## 2020-03-22 PROCEDURE — 84100 ASSAY OF PHOSPHORUS: CPT

## 2020-03-22 PROCEDURE — 27200966 HC CLOSED SUCTION SYSTEM

## 2020-03-22 PROCEDURE — 99291 CRITICAL CARE FIRST HOUR: CPT | Mod: ,,, | Performed by: INTERNAL MEDICINE

## 2020-03-22 PROCEDURE — 27200188 HC TRANSDUCER, ART ADULT/PEDS

## 2020-03-22 PROCEDURE — 83735 ASSAY OF MAGNESIUM: CPT

## 2020-03-22 PROCEDURE — 31500 INSERT EMERGENCY AIRWAY: CPT

## 2020-03-22 PROCEDURE — 86140 C-REACTIVE PROTEIN: CPT

## 2020-03-22 PROCEDURE — 99233 SBSQ HOSP IP/OBS HIGH 50: CPT | Mod: ,,, | Performed by: INTERNAL MEDICINE

## 2020-03-22 PROCEDURE — 80048 BASIC METABOLIC PNL TOTAL CA: CPT

## 2020-03-22 PROCEDURE — 83735 ASSAY OF MAGNESIUM: CPT | Mod: 91

## 2020-03-22 PROCEDURE — 25000003 PHARM REV CODE 250: Performed by: INTERNAL MEDICINE

## 2020-03-22 PROCEDURE — 51702 INSERT TEMP BLADDER CATH: CPT

## 2020-03-22 PROCEDURE — 51701 INSERT BLADDER CATHETER: CPT

## 2020-03-22 PROCEDURE — S0028 INJECTION, FAMOTIDINE, 20 MG: HCPCS | Performed by: STUDENT IN AN ORGANIZED HEALTH CARE EDUCATION/TRAINING PROGRAM

## 2020-03-22 PROCEDURE — 94770 HC EXHALED C02 TEST: CPT

## 2020-03-22 PROCEDURE — 94640 AIRWAY INHALATION TREATMENT: CPT

## 2020-03-22 RX ORDER — ISOSORBIDE DINITRATE 10 MG/1
20 TABLET ORAL 3 TIMES DAILY
Status: DISCONTINUED | OUTPATIENT
Start: 2020-03-22 | End: 2020-03-26

## 2020-03-22 RX ORDER — AMLODIPINE BESYLATE 10 MG/1
10 TABLET ORAL DAILY
Status: DISCONTINUED | OUTPATIENT
Start: 2020-03-22 | End: 2020-03-26

## 2020-03-22 RX ORDER — AMLODIPINE BESYLATE 10 MG/1
10 TABLET ORAL DAILY
Status: DISCONTINUED | OUTPATIENT
Start: 2020-03-22 | End: 2020-03-22

## 2020-03-22 RX ORDER — ISOSORBIDE MONONITRATE 30 MG/1
30 TABLET, EXTENDED RELEASE ORAL DAILY
Status: DISCONTINUED | OUTPATIENT
Start: 2020-03-22 | End: 2020-03-22

## 2020-03-22 RX ADMIN — FUROSEMIDE 40 MG: 10 INJECTION, SOLUTION INTRAVENOUS at 11:03

## 2020-03-22 RX ADMIN — ENOXAPARIN SODIUM 40 MG: 100 INJECTION SUBCUTANEOUS at 08:03

## 2020-03-22 RX ADMIN — INSULIN DETEMIR 40 UNITS: 100 INJECTION, SOLUTION SUBCUTANEOUS at 08:03

## 2020-03-22 RX ADMIN — INSULIN ASPART 8 UNITS: 100 INJECTION, SOLUTION INTRAVENOUS; SUBCUTANEOUS at 06:03

## 2020-03-22 RX ADMIN — HYDRALAZINE HYDROCHLORIDE 100 MG: 50 TABLET ORAL at 12:03

## 2020-03-22 RX ADMIN — CARVEDILOL 25 MG: 25 TABLET, FILM COATED ORAL at 08:03

## 2020-03-22 RX ADMIN — SODIUM CHLORIDE 30 MG/ML INHALATION SOLUTION 4 ML: 30 SOLUTION INHALANT at 07:03

## 2020-03-22 RX ADMIN — INSULIN ASPART 3 UNITS: 100 INJECTION, SOLUTION INTRAVENOUS; SUBCUTANEOUS at 02:03

## 2020-03-22 RX ADMIN — METHYLPREDNISOLONE SODIUM SUCCINATE 40 MG: 40 INJECTION, POWDER, FOR SOLUTION INTRAMUSCULAR; INTRAVENOUS at 08:03

## 2020-03-22 RX ADMIN — CHLORHEXIDINE GLUCONATE 0.12% ORAL RINSE 15 ML: 1.2 LIQUID ORAL at 08:03

## 2020-03-22 RX ADMIN — DEXMEDETOMIDINE HYDROCHLORIDE 1.2 MCG/KG/HR: 100 INJECTION, SOLUTION, CONCENTRATE INTRAVENOUS at 09:03

## 2020-03-22 RX ADMIN — HYDRALAZINE HYDROCHLORIDE 100 MG: 50 TABLET ORAL at 05:03

## 2020-03-22 RX ADMIN — DEXMEDETOMIDINE HYDROCHLORIDE 1.2 MCG/KG/HR: 100 INJECTION, SOLUTION, CONCENTRATE INTRAVENOUS at 05:03

## 2020-03-22 RX ADMIN — FAMOTIDINE 20 MG: 10 INJECTION INTRAVENOUS at 08:03

## 2020-03-22 RX ADMIN — ISOSORBIDE DINITRATE 20 MG: 10 TABLET ORAL at 08:03

## 2020-03-22 RX ADMIN — DEXMEDETOMIDINE HYDROCHLORIDE 1.2 MCG/KG/HR: 100 INJECTION, SOLUTION, CONCENTRATE INTRAVENOUS at 02:03

## 2020-03-22 RX ADMIN — DEXMEDETOMIDINE HYDROCHLORIDE 1.2 MCG/KG/HR: 100 INJECTION, SOLUTION, CONCENTRATE INTRAVENOUS at 01:03

## 2020-03-22 RX ADMIN — INSULIN ASPART 8 UNITS: 100 INJECTION, SOLUTION INTRAVENOUS; SUBCUTANEOUS at 12:03

## 2020-03-22 RX ADMIN — DEXMEDETOMIDINE HYDROCHLORIDE 1.2 MCG/KG/HR: 100 INJECTION, SOLUTION, CONCENTRATE INTRAVENOUS at 06:03

## 2020-03-22 RX ADMIN — AMLODIPINE BESYLATE 10 MG: 10 TABLET ORAL at 11:03

## 2020-03-22 RX ADMIN — INSULIN ASPART 10 UNITS: 100 INJECTION, SOLUTION INTRAVENOUS; SUBCUTANEOUS at 05:03

## 2020-03-22 RX ADMIN — INSULIN ASPART 8 UNITS: 100 INJECTION, SOLUTION INTRAVENOUS; SUBCUTANEOUS at 08:03

## 2020-03-22 RX ADMIN — Medication 310 MCG/HR: at 05:03

## 2020-03-22 RX ADMIN — SODIUM CHLORIDE 30 MG/ML INHALATION SOLUTION 15 ML: 30 SOLUTION INHALANT at 07:03

## 2020-03-22 RX ADMIN — PROPOFOL 20 MCG/KG/MIN: 10 INJECTION, EMULSION INTRAVENOUS at 03:03

## 2020-03-22 RX ADMIN — FUROSEMIDE 40 MG: 10 INJECTION, SOLUTION INTRAVENOUS at 05:03

## 2020-03-22 RX ADMIN — DEXMEDETOMIDINE HYDROCHLORIDE 1.2 MCG/KG/HR: 100 INJECTION, SOLUTION, CONCENTRATE INTRAVENOUS at 04:03

## 2020-03-22 RX ADMIN — NICARDIPINE HYDROCHLORIDE 7.5 MG/HR: 0.2 INJECTION, SOLUTION INTRAVENOUS at 09:03

## 2020-03-22 RX ADMIN — HYDROXYCHLOROQUINE SULFATE 200 MG: 200 TABLET, FILM COATED ORAL at 08:03

## 2020-03-22 RX ADMIN — Medication 360 MCG/HR: at 10:03

## 2020-03-22 RX ADMIN — ISOSORBIDE DINITRATE 20 MG: 10 TABLET ORAL at 12:03

## 2020-03-22 RX ADMIN — NICARDIPINE HYDROCHLORIDE 7.5 MG/HR: 0.2 INJECTION, SOLUTION INTRAVENOUS at 04:03

## 2020-03-22 RX ADMIN — NICARDIPINE HYDROCHLORIDE 7.5 MG/HR: 0.2 INJECTION, SOLUTION INTRAVENOUS at 03:03

## 2020-03-22 RX ADMIN — NICARDIPINE HYDROCHLORIDE 5 MG/HR: 0.2 INJECTION, SOLUTION INTRAVENOUS at 09:03

## 2020-03-22 RX ADMIN — MINERAL OIL AND WHITE PETROLATUM: 150; 830 OINTMENT OPHTHALMIC at 08:03

## 2020-03-22 RX ADMIN — DEXMEDETOMIDINE HYDROCHLORIDE 1.2 MCG/KG/HR: 100 INJECTION, SOLUTION, CONCENTRATE INTRAVENOUS at 07:03

## 2020-03-22 RX ADMIN — Medication 400 MCG/HR: at 03:03

## 2020-03-22 RX ADMIN — HYDRALAZINE HYDROCHLORIDE 100 MG: 50 TABLET ORAL at 08:03

## 2020-03-22 RX ADMIN — SODIUM CHLORIDE 30 MG/ML INHALATION SOLUTION 3 ML: 30 SOLUTION INHALANT at 12:03

## 2020-03-22 RX ADMIN — SODIUM CHLORIDE 30 MG/ML INHALATION SOLUTION 4 ML: 30 SOLUTION INHALANT at 02:03

## 2020-03-22 RX ADMIN — MIDAZOLAM HYDROCHLORIDE 5 MG/HR: 5 INJECTION, SOLUTION INTRAMUSCULAR; INTRAVENOUS at 07:03

## 2020-03-22 NOTE — PROGRESS NOTES
[facility]  Infectious Disease  Progress Note    Patient Name: Riri Gonsalves  MRN: [id number]  Admission Date: 3/17/2020  Length of Stay: 5 days  Attending Physician: Favio Sebastian MD  Primary Care Provider: Jocelynn Brooks MD    Isolation Status: Airborne and Contact and Droplet  Assessment/Plan:      * Acute respiratory failure with CoVID19 +  Pt is 29yoF w/ PMH DM2, HTN, and benign intracranial HTN transferred [place] [facility] [place] 3/17 for possible ECMOAs per chart review and info from OSH pt had rhinorrhea & dry cough for several days- went to urgent care- was discharged home on amoxicillin. A few days later she was continuing to have fevers as high as 103 & was having worsening SOB at rest- went to [facility]  3/13 with SOB, fevers, and cough.   Was on 2L oxygen. Labs on presentation were CRP 18.2, WBC 3.7, flu negative. Pt was admitted to medicine & started on empiric CAP coverage. Pt's condition continued to deteriorate- worsening leukopenia, lymphopenia, elevated LDH, & CRP, & increasing oxygen requirements. CXR showed worsening multifocal airspace disease. Pt in ARDS. Pt was transferred to MICU on 3/14. Pt decompensated & was intubated on 3/15 & on 3/16 results came back positive for Covid 19. Echo showed normal LV function (EF 60%). Pt was also started on methylprednisolone. 3/17 transferred to [facility]. ID consulted for COVID 19 management.   Hydroxychloroquine started 3/18, CFTX for 3 days; is also on azithromycin    Recommendations  --Continue Hydroxychloroquine , monitor QTc closely last day of HC   -- Continue COVID 19 isolation protocol, limit visitors  -- Remdesivir compassionate use application in process - 3-22-20 - this is not currently available       Remdesivir has been approved for this patient is she is still intubated when it gets here     Anticipated Disposition:     Thank you for your consult. I will follow-up with patient. Please contact us if you have any additional  questions.    Jeffrey Egan MD  Infectious Disease  [facility]    Subjective:     Principal Problem:Acute respiratory failure    HPI: History obtained from review of chart.  Pt is 30 y/o female w/ PMH DM2, HTN, and benign intracranial HTN who presented to [facility] on 3/13 complaining of shortness of breath, fevers, and cough X several days.  Prior to her presentation, she had been treated with amoxicillin with limited benefit.    She was admitted at [facility]. Pt's condition continued to deteriorate- worsening leukopenia, lymphopenia, elevated LDH, & CRP, & increasing oxygen requirements. CXR showed worsening multifocal airspace disease. Pt was transferred to MICU on 3/14. Pt decompensated &was intubated on 3/15 & on 3/16 results came back positive for Covid 19. Pt presents as transfer from [facility] ICU for possible ecmo.  Interval History: intubated and sedated 30% FiO2 and PEEP 5, not on pressors    Review of Systems   Unable to perform ROS: Acuity of condition     Objective:     Vital Signs (Most Recent):  Temp: 99.3 °F (37.4 °C) (03/21/20 1500)  Pulse: 87 (03/21/20 1500)  Resp: (!) 24 (03/21/20 1500)  BP: (!) 146/53 (03/19/20 1432)  SpO2: (!) 90 % (03/21/20 1500) Vital Signs (24h Range):  Temp:  [98.8 °F (37.1 °C)-100.8 °F (38.2 °C)] 99.3 °F (37.4 °C)  Pulse:  [79-96] 87  Resp:  [24] 24  SpO2:  [90 %-99 %] 90 %  Arterial Line BP: (121-182)/(73-94) 181/89     Weight: (!) 149.6 kg (329 lb 12.9 oz)  Body mass index is 46 kg/m².    Estimated Creatinine Clearance: 170.6 mL/min (based on SCr of 0.8 mg/dL).    Physical Exam   Nursing note and vitals reviewed.  Patient not examined because of CoVID19 isolation to limit exposure and use of PPE, discussed with RN - no new findings as of 3-22-20      Significant Labs:   CBC:   Recent Labs   Lab 03/20/20  0300 03/21/20  0300   WBC 4.57 5.54   HGB 10.4* 10.5*   HCT 36.0* 36.7*    304     CMP:   Recent Labs   Lab 03/20/20  0300 03/21/20  0300   *  143   K 4.4 4.0    103   CO2 26 32*   * 218*   BUN 15 17   CREATININE 0.7 0.8   CALCIUM 7.8* 8.3*   PROT 6.3 6.6   ALBUMIN 2.0* 2.3*   BILITOT 0.3 0.3   ALKPHOS 58 55   AST 18 15   ALT 13 12   ANIONGAP 11 8   EGFRNONAA >60.0 >60.0     Microbiology Results (last 7 days)     Procedure Component Value Units Date/Time    Culture, Respiratory with Gram Stain [[id number]] Collected:  03/20/20 0119    Order Status:  Completed Specimen:  Respiratory from Endotracheal Aspirate Updated:  03/21/20 0800     Respiratory Culture Normal respiratory prasanth     Gram Stain (Respiratory) <10 epithelial cells per low power field.     Gram Stain (Respiratory) Rare WBC's     Gram Stain (Respiratory) No organisms seen    Blood culture [[id number]] Collected:  03/19/20 2300    Order Status:  Completed Specimen:  Blood from Peripheral, Hand, Left Updated:  03/21/20 0613     Blood Culture, Routine No Growth to date      No Growth to date    Blood culture [[id number]] Collected:  03/19/20 2300    Order Status:  Completed Specimen:  Blood from Peripheral, Forearm, Left Updated:  03/21/20 0613     Blood Culture, Routine No Growth to date      No Growth to date    Culture, Respiratory with Gram Stain [[id number]] Collected:  03/19/20 2201    Order Status:  Canceled Specimen:  Respiratory from Tracheal Aspirate     Influenza A & B by Molecular [[id number]] Collected:  03/17/20 1613    Order Status:  Completed Specimen:  Nasopharyngeal Swab Updated:  03/17/20 1654     Influenza A, Molecular Negative     Influenza B, Molecular Negative     Flu A & B Source np        All pertinent labs within the past 24 hours have been reviewed.    Significant Imaging: I have reviewed all pertinent imaging results/findings within the past 24 hours.

## 2020-03-22 NOTE — ASSESSMENT & PLAN NOTE
Pt is 29yoF w/ PMH DM2, HTN, and benign intracranial HTN transferred to [facility] on 3/17 for possible ECMOAs per chart review and info from OSH pt had rhinorrhea & dry cough for several days- went to urgent care- was discharged home on amoxicillin. A few days later she was continuing to have fevers as high as 103 & was having worsening SOB at rest- went to [facility]  3/13 with SOB, fevers, and cough.   Was on 2L oxygen. Labs on presentation were CRP 18.2, WBC 3.7, flu negative. Pt was admitted to medicine & started on empiric CAP coverage. Pt's condition continued to deteriorate- worsening leukopenia, lymphopenia, elevated LDH, & CRP, & increasing oxygen requirements. CXR showed worsening multifocal airspace disease. Pt in ARDS. Pt was transferred to MICU on 3/14. Pt decompensated & was intubated on 3/15 & on 3/16 results came back positive for Covid 19. Echo showed normal LV function (EF 60%). Pt was also started on methylprednisolone. 3/17 transferred to [facility]. ID consulted for COVID 19 management.   Hydroxychloroquine started 3/18, CFTX for 3 days; is also on azithromycin    Recommendations  --Continue Hydroxychloroquine , monitor QTc closely last day of HC   -- Continue COVID 19 isolation protocol, limit visitors  -- Remdesivir compassionate use application in process - 3-22-20 - this is not currently available

## 2020-03-22 NOTE — PROGRESS NOTES
[facility]  Critical Care Medicine  Progress Note    Patient Name: Riri Gonsalves  MRN: [id number]  Admission Date: 3/17/2020  Hospital Length of Stay: 5 days  Code Status: Full Code  Attending Provider: Favio Sebastian MD  Primary Care Provider: Jocelynn Brooks MD   Principal Problem: Acute respiratory failure    Subjective:     HPI:  Pt is 29yoF w/ PMH DM2, HTN, and benign intracranial HTN who presented to [facility] on 3/13 complaining of shortness of breath, fevers, and cough. 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 According to notes from OSH pt had rhinorrhea & dry cough for several days- went to urgent care where she was told she had flu-like symptoms & was discharged home on amoxicillin. A few days later she was continuing to have fevers as high as 103 & was having worsening SOB at rest- went to [facility] ED & was initially feeling better on 2L NC. Labs on presentation were CRP 18.2, WBC 3.7, flu negative. Pt was admitted to medicine & started on empiric CAP coverage. Pt's condition continued to deteriorate- worsening leukopenia, lymphopenia, elevated LDH, & CRP, & increasing oxygen requirements. CXR showed worsening multifocal airspace disease. Pt was transferred to MICU on 3/14. Pt decompensated &was intubated on 3/15 & on 3/16 results came back positive for Covid 19. Echo showed normal LV function (EF 60%)Pt required fentanyl, precedex, and propofol. Pt also started on methylprednisolone. Pt was proned on 3/16. Pt was supinated prior to transfer.   Pt presents as transfer from [facility] ICU for possible ecmo.     Hospital/ICU Course:  3/17 - Transferred from [facility] ICU, was pronated at 1745 on 3/17.  3/18 - Pt supinated at 1100.    Patient with hypertriglyceridemia.   Patient continue to be volume up on patient started on scheduled 40mg Q6 IV lasix to euvolemic status    Interval History/Significant Events:   No acute events overnight.   Patient still on precedex, fentanyl, versed 5.  Continues to wean down on cardene gtt. Patient on insulin gtt 4U for persistent hyperglycemia.     Review of Systems   Unable to perform ROS: Intubated     Objective:     Vital Signs (Most Recent):  Temp: 98.4 °F (36.9 °C) (03/22/20 1030)  Pulse: 87 (03/22/20 1030)  Resp: 18 (03/22/20 1030)  BP: 131/69 (03/22/20 0800)  SpO2: (!) 93 % (03/22/20 1030) Vital Signs (24h Range):  Temp:  [98.4 °F (36.9 °C)-99.5 °F (37.5 °C)] 98.4 °F (36.9 °C)  Pulse:  [75-87] 87  Resp:  [18-24] 18  SpO2:  [89 %-100 %] 93 %  BP: (131)/(69) 131/69  Arterial Line BP: (152-182)/(73-90) 158/81   Weight: (!) 149.6 kg (329 lb 12.9 oz)  Body mass index is 46 kg/m².      Intake/Output Summary (Last 24 hours) at 3/22/2020 1138  Last data filed at 3/22/2020 1000  Gross per 24 hour   Intake 4662.14 ml   Output 2610 ml   Net 2052.14 ml       Physical Exam     Patient was not examined due to +COVID positive status; defer to attending/fellow exam. Please refer to attestation.     Vents:  Vent Mode: A/C (03/22/20 0717)  Ventilator Initiated: Yes (03/17/20 1619)  Set Rate: 20 BPM (03/22/20 0717)  Vt Set: 450 mL (03/22/20 0717)  Pressure Support: 3 cmH20 (03/22/20 0717)  PEEP/CPAP: 12 cmH20 (03/22/20 0717)  Oxygen Concentration (%): 40 (03/22/20 0600)  Peak Airway Pressure: 40 cmH2O (03/22/20 0717)  Plateau Pressure: 31 cmH20 (03/22/20 0717)  Total Ve: 8.4 mL (03/22/20 0717)  F/VT Ratio<105 (RSBI): (!) 44.44 (03/22/20 0717)  Lines/Drains/Airways     Central Venous Catheter Line            Percutaneous Central Line Insertion/Assessment - Triple Lumen  03/17/20 1100 right internal jugular 5 days          Drain                 NG/OG Tube 03/17/20  Right mouth 5 days         Urethral Catheter 03/17/20 1200 4 days          Airway                 Airway - Non-Surgical -- days          Arterial Line                 Arterial Line 03/18/20 1344 Right Radial 3 days          Peripheral Intravenous Line                 Peripheral IV - Single Lumen 03/17/20   Anterior;Distal;Right Forearm 5 days         Peripheral IV - Single Lumen 03/17/20 Anterior;Proximal;Right Forearm 5 days              Significant Labs:    CBC/Anemia Profile:  Recent Labs   Lab 03/21/20  0300 03/22/20  0300   WBC 5.54 5.14   HGB 10.5* 11.1*   HCT 36.7* 37.5    311   MCV 88 87   RDW 14.0 13.6        Chemistries:  Recent Labs   Lab 03/21/20  0300 03/22/20  0300    139   K 4.0 4.1    98   CO2 32* 28   BUN 17 22*   CREATININE 0.8 0.8   CALCIUM 8.3* 8.7   ALBUMIN 2.3* 2.4*   PROT 6.6 6.7   BILITOT 0.3 0.4   ALKPHOS 55 54*   ALT 12 14   AST 15 15   MG 2.2 2.3   PHOS 3.5 4.1         Significant Imaging:  I have reviewed all pertinent imaging results/findings within the past 24 hours.      ABG  Recent Labs   Lab 03/22/20  0607   PH 7.518*   PO2 65*   PCO2 41.2   HCO3 33.5*   BE 11     Assessment/Plan:     Pulmonary  * Acute respiratory failure with CoVID19 +  Pt intubated on 3/15, transferred from [facility] on 3/17 for ARDS 2/2 positive COVID 19.   On transfer: , ferritin 368, D-Dimer >33. CRP downtrending.   Patient has required paralysis and proning multiple times since admission.     Plan:  - vent settings wean as tolerated   - continue methylprednisolone 40mg daily  - wean propofol, fentanyl, precedex, versed  - Plaquenil daily   - Completed 3 days azithromycin  - f/u daily CRP, trending down   - Continuing plaquenil; waiting for approval of remdesevir  - A component of respiratory distress could be related to I/O status as patient is +9L, continue to diurese     Cardiac/Vascular  Essential hypertension  Home med includes lisinopril 20mg daily & lasix 40mg daily.     Plan:  - nicardipine drip to be weaned down  - Coreg 25mg BID, Q6h hydralazine 100mg scheduled on 3/21. Added Amlodipine 10 mg qd and Imdur qd on 3/22  - 40MG IV lasix Q6h, monitoring UOP closely.     Endocrine  Type 2 diabetes mellitus without complication, without long-term current use of insulin  Pt has A1c 12.4  (3/17/20) & home med is metformin 500mg BID.     Plan:  Required 174U total insulin  - On insulin gtt  - accuchecks Q4h    - Detemir 30U BID, will transition off gtt once patient's BGL<200. Nursing to call team when BGLs drop below 200 to d/c gtt  - MDSSI     Critical Care Daily Checklist:    A: Awake: RASS Goal/Actual Goal: RASS Goal: -5-->unarousable  Actual: [place] Agitation Sedation Scale (RASS): Unarousable   B: Spontaneous Breathing Trial Performed? Spon. Breathing Trial Initiated?: Not initiated (03/22/20 0717)   C: SAT & SBT Coordinated?  n/a                      D: Delirium: CAM-ICU Overall CAM-ICU: Positive   E: Early Mobility Performed? No   F: Feeding Goal: Goals: Meet % EEN, EPN by RD f/u date  Status: Nutrition Goal Status: new   Current Diet Order   No orders of the defined types were placed in this encounter.     TUBE FEEDS when off nimbex     AS: Analgesia/Sedation Multimodal, wean as tolerated   T: Thromboembolic Prophylaxis lovenox   H: HOB > 300 Yes   U: Stress Ulcer Prophylaxis (if needed) famotidine   G: Glucose Control Insulin gtt, transition to SSI   B: Bowel Function Stool Occurrence: 0   I: Indwelling Catheter (Lines & Saxena) Necessity assessed   D: De-escalation of Antimicrobials/Pharmacotherapies assessed    Plan for the day/ETD Continue care    Code Status:  Family/Goals of Care: Full Code       Critical secondary to Patient has a condition that poses threat to life and bodily function: Severe Respiratory Distress      Critical care was time spent personally by me on the following activities: development of treatment plan with patient or surrogate and bedside caregivers, discussions with consultants, evaluation of patient's response to treatment, examination of patient, ordering and performing treatments and interventions, ordering and review of laboratory studies, ordering and review of radiographic studies, pulse oximetry, re-evaluation of patient's condition. This critical  care time did not overlap with that of any other provider or involve time for any procedures.     Kushal Davis MD  Critical Care Medicine  [facility]

## 2020-03-22 NOTE — SUBJECTIVE & OBJECTIVE
Interval History/Significant Events:   No acute events overnight.   Patient still on precedex, fentanyl, versed 5. Continues to wean down on cardene gtt. Patient on insulin gtt 4U for persistent hyperglycemia.     Review of Systems   Unable to perform ROS: Intubated     Objective:     Vital Signs (Most Recent):  Temp: 98.4 °F (36.9 °C) (03/22/20 1030)  Pulse: 87 (03/22/20 1030)  Resp: 18 (03/22/20 1030)  BP: 131/69 (03/22/20 0800)  SpO2: (!) 93 % (03/22/20 1030) Vital Signs (24h Range):  Temp:  [98.4 °F (36.9 °C)-99.5 °F (37.5 °C)] 98.4 °F (36.9 °C)  Pulse:  [75-87] 87  Resp:  [18-24] 18  SpO2:  [89 %-100 %] 93 %  BP: (131)/(69) 131/69  Arterial Line BP: (152-182)/(73-90) 158/81   Weight: (!) 149.6 kg (329 lb 12.9 oz)  Body mass index is 46 kg/m².      Intake/Output Summary (Last 24 hours) at 3/22/2020 1138  Last data filed at 3/22/2020 1000  Gross per 24 hour   Intake 4662.14 ml   Output 2610 ml   Net 2052.14 ml       Physical Exam     Patient was not examined due to +COVID positive status; defer to attending/fellow exam. Please refer to attestation.     Vents:  Vent Mode: A/C (03/22/20 0717)  Ventilator Initiated: Yes (03/17/20 1619)  Set Rate: 20 BPM (03/22/20 0717)  Vt Set: 450 mL (03/22/20 0717)  Pressure Support: 3 cmH20 (03/22/20 0717)  PEEP/CPAP: 12 cmH20 (03/22/20 0717)  Oxygen Concentration (%): 40 (03/22/20 0600)  Peak Airway Pressure: 40 cmH2O (03/22/20 0717)  Plateau Pressure: 31 cmH20 (03/22/20 0717)  Total Ve: 8.4 mL (03/22/20 0717)  F/VT Ratio<105 (RSBI): (!) 44.44 (03/22/20 0717)  Lines/Drains/Airways     Central Venous Catheter Line            Percutaneous Central Line Insertion/Assessment - Triple Lumen  03/17/20 1100 right internal jugular 5 days          Drain                 NG/OG Tube 03/17/20  Right mouth 5 days         Urethral Catheter 03/17/20 1200 4 days          Airway                 Airway - Non-Surgical -- days          Arterial Line                 Arterial Line 03/18/20 1344 Right  Radial 3 days          Peripheral Intravenous Line                 Peripheral IV - Single Lumen 03/17/20  Anterior;Distal;Right Forearm 5 days         Peripheral IV - Single Lumen 03/17/20 Anterior;Proximal;Right Forearm 5 days              Significant Labs:    CBC/Anemia Profile:  Recent Labs   Lab 03/21/20  0300 03/22/20  0300   WBC 5.54 5.14   HGB 10.5* 11.1*   HCT 36.7* 37.5    311   MCV 88 87   RDW 14.0 13.6        Chemistries:  Recent Labs   Lab 03/21/20  0300 03/22/20  0300    139   K 4.0 4.1    98   CO2 32* 28   BUN 17 22*   CREATININE 0.8 0.8   CALCIUM 8.3* 8.7   ALBUMIN 2.3* 2.4*   PROT 6.6 6.7   BILITOT 0.3 0.4   ALKPHOS 55 54*   ALT 12 14   AST 15 15   MG 2.2 2.3   PHOS 3.5 4.1         Significant Imaging:  I have reviewed all pertinent imaging results/findings within the past 24 hours.

## 2020-03-23 ENCOUNTER — TELEPHONE (OUTPATIENT)
Dept: INFECTIOUS DISEASES | Facility: CLINIC | Age: 30
End: 2020-03-23

## 2020-03-23 LAB
ALBUMIN SERPL BCP-MCNC: 2.2 G/DL (ref 3.5–5.2)
ALP SERPL-CCNC: 54 U/L (ref 55–135)
ALT SERPL W/O P-5'-P-CCNC: 13 U/L (ref 10–44)
ANION GAP SERPL CALC-SCNC: 11 MMOL/L (ref 8–16)
AST SERPL-CCNC: 16 U/L (ref 10–40)
BACTERIA SPEC AEROBE CULT: NORMAL
BACTERIA SPEC AEROBE CULT: NORMAL
BASOPHILS # BLD AUTO: 0.01 K/UL (ref 0–0.2)
BASOPHILS NFR BLD: 0.2 % (ref 0–1.9)
BILIRUB SERPL-MCNC: 0.4 MG/DL (ref 0.1–1)
BUN SERPL-MCNC: 28 MG/DL (ref 6–20)
CALCIUM SERPL-MCNC: 8.6 MG/DL (ref 8.7–10.5)
CHLORIDE SERPL-SCNC: 100 MMOL/L (ref 95–110)
CO2 SERPL-SCNC: 27 MMOL/L (ref 23–29)
CREAT SERPL-MCNC: 0.9 MG/DL (ref 0.5–1.4)
CRP SERPL-MCNC: 24.1 MG/L (ref 0–8.2)
DIFFERENTIAL METHOD: ABNORMAL
EOSINOPHIL # BLD AUTO: 0 K/UL (ref 0–0.5)
EOSINOPHIL NFR BLD: 0 % (ref 0–8)
ERYTHROCYTE [DISTWIDTH] IN BLOOD BY AUTOMATED COUNT: 13.4 % (ref 11.5–14.5)
EST. GFR  (AFRICAN AMERICAN): >60 ML/MIN/1.73 M^2
EST. GFR  (NON AFRICAN AMERICAN): >60 ML/MIN/1.73 M^2
GLUCOSE SERPL-MCNC: 322 MG/DL (ref 70–110)
GRAM STN SPEC: NORMAL
HCT VFR BLD AUTO: 34.9 % (ref 37–48.5)
HGB BLD-MCNC: 10.2 G/DL (ref 12–16)
IMM GRANULOCYTES # BLD AUTO: 0.11 K/UL (ref 0–0.04)
IMM GRANULOCYTES NFR BLD AUTO: 2 % (ref 0–0.5)
LACTATE SERPL-SCNC: 0.8 MMOL/L (ref 0.5–2.2)
LIPASE SERPL-CCNC: 230 U/L (ref 4–60)
LYMPHOCYTES # BLD AUTO: 0.7 K/UL (ref 1–4.8)
LYMPHOCYTES NFR BLD: 13 % (ref 18–48)
MAGNESIUM SERPL-MCNC: 2.2 MG/DL (ref 1.6–2.6)
MCH RBC QN AUTO: 25 PG (ref 27–31)
MCHC RBC AUTO-ENTMCNC: 29.2 G/DL (ref 32–36)
MCV RBC AUTO: 86 FL (ref 82–98)
MONOCYTES # BLD AUTO: 0.3 K/UL (ref 0.3–1)
MONOCYTES NFR BLD: 6 % (ref 4–15)
NEUTROPHILS # BLD AUTO: 4.2 K/UL (ref 1.8–7.7)
NEUTROPHILS NFR BLD: 78.8 % (ref 38–73)
NRBC BLD-RTO: 0 /100 WBC
PHOSPHATE SERPL-MCNC: 4.9 MG/DL (ref 2.7–4.5)
PLATELET # BLD AUTO: 285 K/UL (ref 150–350)
PMV BLD AUTO: 10.2 FL (ref 9.2–12.9)
POCT GLUCOSE: 247 MG/DL (ref 70–110)
POCT GLUCOSE: 248 MG/DL (ref 70–110)
POCT GLUCOSE: 268 MG/DL (ref 70–110)
POCT GLUCOSE: 289 MG/DL (ref 70–110)
POCT GLUCOSE: 301 MG/DL (ref 70–110)
POTASSIUM SERPL-SCNC: 4.2 MMOL/L (ref 3.5–5.1)
PROT SERPL-MCNC: 6.2 G/DL (ref 6–8.4)
RBC # BLD AUTO: 4.08 M/UL (ref 4–5.4)
SODIUM SERPL-SCNC: 138 MMOL/L (ref 136–145)
TRIGL SERPL-MCNC: 574 MG/DL (ref 30–150)
WBC # BLD AUTO: 5.37 K/UL (ref 3.9–12.7)

## 2020-03-23 PROCEDURE — 94003 VENT MGMT INPAT SUBQ DAY: CPT

## 2020-03-23 PROCEDURE — 94770 HC EXHALED C02 TEST: CPT

## 2020-03-23 PROCEDURE — 83735 ASSAY OF MAGNESIUM: CPT

## 2020-03-23 PROCEDURE — 83605 ASSAY OF LACTIC ACID: CPT

## 2020-03-23 PROCEDURE — 25000003 PHARM REV CODE 250: Performed by: STUDENT IN AN ORGANIZED HEALTH CARE EDUCATION/TRAINING PROGRAM

## 2020-03-23 PROCEDURE — 63600175 PHARM REV CODE 636 W HCPCS: Performed by: INTERNAL MEDICINE

## 2020-03-23 PROCEDURE — 80053 COMPREHEN METABOLIC PANEL: CPT

## 2020-03-23 PROCEDURE — 93005 ELECTROCARDIOGRAM TRACING: CPT

## 2020-03-23 PROCEDURE — 99233 SBSQ HOSP IP/OBS HIGH 50: CPT | Mod: ,,, | Performed by: INTERNAL MEDICINE

## 2020-03-23 PROCEDURE — 94640 AIRWAY INHALATION TREATMENT: CPT

## 2020-03-23 PROCEDURE — 93010 ELECTROCARDIOGRAM REPORT: CPT | Mod: ,,, | Performed by: INTERNAL MEDICINE

## 2020-03-23 PROCEDURE — 27200966 HC CLOSED SUCTION SYSTEM

## 2020-03-23 PROCEDURE — 99900026 HC AIRWAY MAINTENANCE (STAT)

## 2020-03-23 PROCEDURE — 99900035 HC TECH TIME PER 15 MIN (STAT)

## 2020-03-23 PROCEDURE — 25000003 PHARM REV CODE 250: Performed by: INTERNAL MEDICINE

## 2020-03-23 PROCEDURE — 63600175 PHARM REV CODE 636 W HCPCS: Performed by: STUDENT IN AN ORGANIZED HEALTH CARE EDUCATION/TRAINING PROGRAM

## 2020-03-23 PROCEDURE — 83690 ASSAY OF LIPASE: CPT

## 2020-03-23 PROCEDURE — 93005 ELECTROCARDIOGRAM TRACING: CPT | Performed by: INTERNAL MEDICINE

## 2020-03-23 PROCEDURE — 20000000 HC ICU ROOM

## 2020-03-23 PROCEDURE — 94761 N-INVAS EAR/PLS OXIMETRY MLT: CPT

## 2020-03-23 PROCEDURE — 27000221 HC OXYGEN, UP TO 24 HOURS

## 2020-03-23 PROCEDURE — 84100 ASSAY OF PHOSPHORUS: CPT

## 2020-03-23 PROCEDURE — 25000242 PHARM REV CODE 250 ALT 637 W/ HCPCS: Performed by: STUDENT IN AN ORGANIZED HEALTH CARE EDUCATION/TRAINING PROGRAM

## 2020-03-23 PROCEDURE — 93010 EKG 12-LEAD: ICD-10-PCS | Mod: ,,, | Performed by: INTERNAL MEDICINE

## 2020-03-23 PROCEDURE — 99233 PR SUBSEQUENT HOSPITAL CARE,LEVL III: ICD-10-PCS | Mod: ,,, | Performed by: INTERNAL MEDICINE

## 2020-03-23 PROCEDURE — 86140 C-REACTIVE PROTEIN: CPT

## 2020-03-23 PROCEDURE — 37799 UNLISTED PX VASCULAR SURGERY: CPT

## 2020-03-23 PROCEDURE — 84478 ASSAY OF TRIGLYCERIDES: CPT

## 2020-03-23 PROCEDURE — S0028 INJECTION, FAMOTIDINE, 20 MG: HCPCS | Performed by: STUDENT IN AN ORGANIZED HEALTH CARE EDUCATION/TRAINING PROGRAM

## 2020-03-23 PROCEDURE — 82803 BLOOD GASES ANY COMBINATION: CPT

## 2020-03-23 PROCEDURE — 85025 COMPLETE CBC W/AUTO DIFF WBC: CPT

## 2020-03-23 RX ORDER — AMOXICILLIN 250 MG
1 CAPSULE ORAL DAILY
Status: DISCONTINUED | OUTPATIENT
Start: 2020-03-23 | End: 2020-03-23

## 2020-03-23 RX ORDER — FAMOTIDINE 20 MG/1
20 TABLET, FILM COATED ORAL 2 TIMES DAILY
Status: DISCONTINUED | OUTPATIENT
Start: 2020-03-23 | End: 2020-03-24

## 2020-03-23 RX ORDER — AMOXICILLIN 250 MG
1 CAPSULE ORAL DAILY
Status: DISCONTINUED | OUTPATIENT
Start: 2020-03-23 | End: 2020-03-26

## 2020-03-23 RX ORDER — FUROSEMIDE 10 MG/ML
80 INJECTION INTRAMUSCULAR; INTRAVENOUS
Status: DISCONTINUED | OUTPATIENT
Start: 2020-03-23 | End: 2020-03-25

## 2020-03-23 RX ADMIN — DEXMEDETOMIDINE HYDROCHLORIDE 1.3 MCG/KG/HR: 100 INJECTION, SOLUTION, CONCENTRATE INTRAVENOUS at 12:03

## 2020-03-23 RX ADMIN — NICARDIPINE HYDROCHLORIDE 5 MG/HR: 0.2 INJECTION, SOLUTION INTRAVENOUS at 06:03

## 2020-03-23 RX ADMIN — SODIUM CHLORIDE 30 MG/ML INHALATION SOLUTION 4 ML: 30 SOLUTION INHALANT at 08:03

## 2020-03-23 RX ADMIN — CHLORHEXIDINE GLUCONATE 0.12% ORAL RINSE 15 ML: 1.2 LIQUID ORAL at 09:03

## 2020-03-23 RX ADMIN — Medication 350 MCG/HR: at 07:03

## 2020-03-23 RX ADMIN — INSULIN DETEMIR 40 UNITS: 100 INJECTION, SOLUTION SUBCUTANEOUS at 09:03

## 2020-03-23 RX ADMIN — DEXMEDETOMIDINE HYDROCHLORIDE 1.4 MCG/KG/HR: 100 INJECTION, SOLUTION, CONCENTRATE INTRAVENOUS at 09:03

## 2020-03-23 RX ADMIN — ISOSORBIDE DINITRATE 20 MG: 10 TABLET ORAL at 08:03

## 2020-03-23 RX ADMIN — DEXMEDETOMIDINE HYDROCHLORIDE 1.4 MCG/KG/HR: 100 INJECTION, SOLUTION, CONCENTRATE INTRAVENOUS at 02:03

## 2020-03-23 RX ADMIN — FUROSEMIDE 40 MG: 10 INJECTION, SOLUTION INTRAVENOUS at 05:03

## 2020-03-23 RX ADMIN — ISOSORBIDE DINITRATE 20 MG: 10 TABLET ORAL at 05:03

## 2020-03-23 RX ADMIN — CARVEDILOL 25 MG: 25 TABLET, FILM COATED ORAL at 08:03

## 2020-03-23 RX ADMIN — SODIUM CHLORIDE 4 UNITS/HR: 9 INJECTION, SOLUTION INTRAVENOUS at 04:03

## 2020-03-23 RX ADMIN — METHYLPREDNISOLONE SODIUM SUCCINATE 40 MG: 40 INJECTION, POWDER, FOR SOLUTION INTRAMUSCULAR; INTRAVENOUS at 08:03

## 2020-03-23 RX ADMIN — INSULIN ASPART 4 UNITS: 100 INJECTION, SOLUTION INTRAVENOUS; SUBCUTANEOUS at 01:03

## 2020-03-23 RX ADMIN — FAMOTIDINE 20 MG: 20 TABLET, FILM COATED ORAL at 08:03

## 2020-03-23 RX ADMIN — Medication 200 MCG/HR: at 03:03

## 2020-03-23 RX ADMIN — FUROSEMIDE 80 MG: 10 INJECTION, SOLUTION INTRAVENOUS at 08:03

## 2020-03-23 RX ADMIN — ISOSORBIDE DINITRATE 20 MG: 10 TABLET ORAL at 12:03

## 2020-03-23 RX ADMIN — INSULIN ASPART 4 UNITS: 100 INJECTION, SOLUTION INTRAVENOUS; SUBCUTANEOUS at 09:03

## 2020-03-23 RX ADMIN — SODIUM CHLORIDE 30 MG/ML INHALATION SOLUTION 4 ML: 30 SOLUTION INHALANT at 02:03

## 2020-03-23 RX ADMIN — DEXMEDETOMIDINE HYDROCHLORIDE 1.4 MCG/KG/HR: 100 INJECTION, SOLUTION, CONCENTRATE INTRAVENOUS at 10:03

## 2020-03-23 RX ADMIN — HYDRALAZINE HYDROCHLORIDE 100 MG: 50 TABLET ORAL at 12:03

## 2020-03-23 RX ADMIN — MINERAL OIL AND WHITE PETROLATUM: 150; 830 OINTMENT OPHTHALMIC at 09:03

## 2020-03-23 RX ADMIN — Medication 125 MCG/HR: at 11:03

## 2020-03-23 RX ADMIN — DEXMEDETOMIDINE HYDROCHLORIDE 1.4 MCG/KG/HR: 100 INJECTION, SOLUTION, CONCENTRATE INTRAVENOUS at 06:03

## 2020-03-23 RX ADMIN — DEXMEDETOMIDINE HYDROCHLORIDE 1.4 MCG/KG/HR: 100 INJECTION, SOLUTION, CONCENTRATE INTRAVENOUS at 07:03

## 2020-03-23 RX ADMIN — HYDRALAZINE HYDROCHLORIDE 100 MG: 50 TABLET ORAL at 05:03

## 2020-03-23 RX ADMIN — FUROSEMIDE 40 MG: 10 INJECTION, SOLUTION INTRAVENOUS at 01:03

## 2020-03-23 RX ADMIN — NICARDIPINE HYDROCHLORIDE 5 MG/HR: 0.2 INJECTION, SOLUTION INTRAVENOUS at 11:03

## 2020-03-23 RX ADMIN — HYDRALAZINE HYDROCHLORIDE 100 MG: 50 TABLET ORAL at 08:03

## 2020-03-23 RX ADMIN — DOCUSATE SODIUM - SENNOSIDES 1 TABLET: 50; 8.6 TABLET, FILM COATED ORAL at 08:03

## 2020-03-23 RX ADMIN — INSULIN DETEMIR 40 UNITS: 100 INJECTION, SOLUTION SUBCUTANEOUS at 08:03

## 2020-03-23 RX ADMIN — CHLORHEXIDINE GLUCONATE 0.12% ORAL RINSE 15 ML: 1.2 LIQUID ORAL at 08:03

## 2020-03-23 RX ADMIN — INSULIN ASPART 6 UNITS: 100 INJECTION, SOLUTION INTRAVENOUS; SUBCUTANEOUS at 05:03

## 2020-03-23 RX ADMIN — ENOXAPARIN SODIUM 40 MG: 100 INJECTION SUBCUTANEOUS at 08:03

## 2020-03-23 RX ADMIN — SODIUM CHLORIDE 30 MG/ML INHALATION SOLUTION 4 ML: 30 SOLUTION INHALANT at 07:03

## 2020-03-23 RX ADMIN — DEXMEDETOMIDINE HYDROCHLORIDE 1.4 MCG/KG/HR: 100 INJECTION, SOLUTION, CONCENTRATE INTRAVENOUS at 03:03

## 2020-03-23 RX ADMIN — FUROSEMIDE 40 MG: 10 INJECTION, SOLUTION INTRAVENOUS at 12:03

## 2020-03-23 RX ADMIN — AMLODIPINE BESYLATE 10 MG: 10 TABLET ORAL at 08:03

## 2020-03-23 RX ADMIN — Medication 275 MCG/HR: at 12:03

## 2020-03-23 RX ADMIN — SODIUM CHLORIDE 30 MG/ML INHALATION SOLUTION 4 ML: 30 SOLUTION INHALANT at 12:03

## 2020-03-23 RX ADMIN — FAMOTIDINE 20 MG: 10 INJECTION INTRAVENOUS at 08:03

## 2020-03-23 NOTE — PROGRESS NOTES
"  [facility]  Adult Nutrition  Consult Note    SUMMARY     Recommendations    1. As tolerated, increase TF rate (of Glucerna 1.5) to 60 mL/hr to provide 2160 kcals, 119 g of protein, 1093 mL fluid.  2. RD to monitor & follow-up.    Goals: Meet % EEN, EPN by RD f/u date  Nutrition Goal Status: progressing towards goal  Communication of RD Recs: reviewed with RN    Reason for Assessment    Reason For Assessment: RD follow-up  Diagnosis: other (see comments)(Resp. fx)  Relevant Medical History: HTN, DM  Interdisciplinary Rounds: attended    General Information Comments: Pt remains intubated, tolerating current TF regimen per RN documentation. Remote access 2/2 to pt positive for COVID-19. No wt history found in chart review. Due to contact/airborne precautions, NFPE unable to be assessed at this time. Will monitor. Pt remains inappropriate for diabetic diet education (A1C 12.4).  Nutrition Discharge Planning: Unable to determine    Nutrition/Diet History    Spiritual, Cultural Beliefs, Gnosticist Practices, Values that Affect Care: no  Factors Affecting Nutritional Intake: NPO, on mechanical ventilation    Anthropometrics    Temp: 97.2 °F (36.2 °C)  Height Method: (obtained from previous record)  Height: 5' 11" (180.3 cm)  Height (inches): 71 in  Weight Method: Bed Scale  Weight: (!) 156.8 kg (345 lb 10.9 oz)  Weight (lb): (!) 345.68 lb  Ideal Body Weight (IBW), Female: 155 lb  % Ideal Body Weight, Female (lb): 223.02 %  BMI (Calculated): 48.2  BMI Grade: greater than 40 - morbid obesity    Lab/Procedures/Meds    Pertinent Labs Reviewed: reviewed  Pertinent Labs Comments: Gluc 332, BUN 28  Pertinent Medications Reviewed: reviewed  Pertinent Medications Comments: Precedex, Fentanyl, Insulin, Nicardipine    Estimated/Assessed Needs    Weight Used For Calorie Calculations: (!) 136.2 kg (300 lb 4.3 oz)(Admit wt)     Energy Calorie Requirements (kcal): 2252 kcal/d  Energy Need Method: [facility] "     Protein Requirements: 106-141 g/d (1.5-2 g/kg IBW)  Weight Used For Protein Calculations: 70.5 kg (155 lb 6.8 oz)     Estimated Fluid Requirement Method: other (see comments)(Per MD or 1 mL/kcal)     CHO Requirement: 50% total kcals    Nutrition Prescription Ordered    Current Diet Order: NPO  Current Nutrition Support Formula Ordered: Glucerna 1.5  Current Nutrition Support Rate Ordered: 15 mL/hr    Evaluation of Received Nutrient/Fluid Intake    Enteral Calories (kcal): 540  Enteral Protein (gm): 30  Enteral (Free Water) Fluid (mL): 273  Free water flushes: 1200    Other Calories (kcal): 0    % Kcal Needs: 24%  % Protein Needs: 28%    Energy Calories Required: not meeting needs  Protein Required: not meeting needs  Fluid Required: other (see comments)(Per MD or 1 mL/kcal)    Comments: LBM: 3/15    Tolerance: tolerating    Nutrition Risk    Level of Risk/Frequency of Follow-up: (2x/week)     Assessment and Plan    Nutrition Problem  Inadequate energy intake     Related to (etiology):   Inability to consume sufficient energy     Signs and Symptoms (as evidenced by):   NPO     Interventions(treatment strategy):  Collaboration of nutrition care w/ other providers      Nutrition Diagnosis Status:   Continues      Monitor and Evaluation    Food and Nutrient Intake: energy intake, food and beverage intake, enteral nutrition intake  Food and Nutrient Adminstration: diet order, enteral and parenteral nutrition administration  Physical Activity and Function: nutrition-related ADLs and IADLs  Anthropometric Measurements: weight, weight change  Biochemical Data, Medical Tests and Procedures: inflammatory profile, lipid profile, glucose/endocrine profile, gastrointestinal profile, electrolyte and renal panel  Nutrition-Focused Physical Findings: overall appearance     Nutrition Follow-Up    RD Follow-up?: Yes

## 2020-03-23 NOTE — PLAN OF CARE
Patient not medically stable for stepdown at this time. Patient continues to require [facility] service for:     ETT  paralytics    CM remains available for any further patient/family needs or concerns.        03/23/20 1205   Discharge Reassessment   Assessment Type Discharge Planning Reassessment   Do you have any problems affording any of your prescribed medications? No   Discharge Plan A Home   Discharge Plan B Home with family   Anticipated Discharge Disposition Home   Can the patient/caregiver answer the patient profile reliably? No, cognitively impaired  (intubated)       Joaquín Justice RN MSN  Critical Care-   Ext. [phone]

## 2020-03-23 NOTE — PROGRESS NOTES
[facility]  Critical Care Medicine  Progress Note    Patient Name: Riri Gonsalves  MRN: [id number]  Admission Date: 3/17/2020  Hospital Length of Stay: 6 days  Code Status: Full Code  Attending Provider: Ankush Shaikh*  Primary Care Provider: Jocelynn Brooks MD   Principal Problem: Acute respiratory failure    Subjective:     HPI:  Pt is 29yoF w/ PMH DM2, HTN, and benign intracranial HTN who presented to [facility] on 3/13 complaining of shortness of breath, fevers, and cough. 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 According to notes from OSH pt had rhinorrhea & dry cough for several days- went to urgent care where she was told she had flu-like symptoms & was discharged home on amoxicillin. A few days later she was continuing to have fevers as high as 103 & was having worsening SOB at rest- went to [facility] ED & was initially feeling better on 2L NC. Labs on presentation were CRP 18.2, WBC 3.7, flu negative. Pt was admitted to medicine & started on empiric CAP coverage. Pt's condition continued to deteriorate- worsening leukopenia, lymphopenia, elevated LDH, & CRP, & increasing oxygen requirements. CXR showed worsening multifocal airspace disease. Pt was transferred to MICU on 3/14. Pt decompensated &was intubated on 3/15 & on 3/16 results came back positive for Covid 19. Echo showed normal LV function (EF 60%)Pt required fentanyl, precedex, and propofol. Pt also started on methylprednisolone. Pt was proned on 3/16. Pt was supinated prior to transfer.   Pt presents as transfer from [facility] ICU for possible ecmo.     Hospital/ICU Course:  3/17 - Transferred from [facility] ICU, was pronated at 1745 on 3/17. Cardine started for HTN and insulin drip for hyperglycemia (Hx uncontrolled T2DM)  3/18 - Pt supinated at 1100.  3/22 - Vent day 6 ICU day 6: increased urine sedimentation, foul smelling urine, thus richard was exchanged, with 1.5L output, and cultures sent  - Patient pending  Remdesivir compassionate use application    Patient with hypertriglyceridemia likely 2/2 propofol. Propofol D/C'd 3/19  (Highest at 895 3/19, reduced at 574 3/23)  Patient continue to be volume up on patient started on scheduled 40mg Q6 IV lasix to euvolemic status      Interval History/Significant Events: Vent day 7 ICU day 7 Attempted to D/C cardene at 0530 (also on Coareg 25 BID, Hydralazine 100mg Q8H, Norvasc 10mg QD), but patient's BP promptly amado to SBP 180s, thus cardene restarted at 2.5 at 0700. Remains on Precedex 1.4, Fentanyl 275. Insulin drip at 4U/Hr, BG ranges 320s-300s (goal 140-180). No other acute events noted.    Review of Systems   Unable to perform ROS: Intubated     Objective:     Vital Signs (Most Recent):  Temp: 97 °F (36.1 °C) (03/23/20 0800)  Pulse: (!) 135 (03/23/20 1248)  Resp: 19 (03/23/20 1248)  BP: (!) 206/94 (03/23/20 1237)  SpO2: 96 % (03/23/20 1248) Vital Signs (24h Range):  Temp:  [97 °F (36.1 °C)-98.6 °F (37 °C)] 97 °F (36.1 °C)  Pulse:  [] 135  Resp:  [18-19] 19  SpO2:  [93 %-99 %] 96 %  BP: (122-206)/(66-94) 206/94  Arterial Line BP: (119-198)/(63-97) 167/95   Weight: (!) 156.8 kg (345 lb 10.9 oz)  Body mass index is 48.21 kg/m².      Intake/Output Summary (Last 24 hours) at 3/23/2020 1311  Last data filed at 3/23/2020 1100  Gross per 24 hour   Intake 2452.34 ml   Output 6950 ml   Net -4497.66 ml       Physical Exam Please refer to attending/fellow attestation, exam not performed by me to reduce infection/exposure risk (COVID POS status)    Vents:  Vent Mode: A/C (03/23/20 1248)  Ventilator Initiated: Yes (03/17/20 1619)  Set Rate: 18 BPM (03/23/20 1248)  Vt Set: 450 mL (03/23/20 1248)  Pressure Support: 3 cmH20 (03/22/20 0717)  PEEP/CPAP: 10 cmH20 (03/23/20 1248)  Oxygen Concentration (%): 40 (03/23/20 1248)  Peak Airway Pressure: 37 cmH2O (03/23/20 1248)  Plateau Pressure: 28 cmH20 (03/22/20 1226)  Total Ve: 8.08 mL (03/23/20 1248)  F/VT Ratio<105 (RSBI): (!) 41.3  (03/23/20 1248)  Lines/Drains/Airways     Central Venous Catheter Line            Percutaneous Central Line Insertion/Assessment - Triple Lumen  03/17/20 1100 right internal jugular 6 days          Drain                 NG/OG Tube 03/17/20  Right mouth 6 days         Urethral Catheter 03/22/20 1700 less than 1 day          Airway                 Airway - Non-Surgical -- days          Arterial Line                 Arterial Line 03/18/20 1344 Right Radial 4 days          Peripheral Intravenous Line                 Peripheral IV - Single Lumen 03/17/20  Anterior;Distal;Right Forearm 6 days         Peripheral IV - Single Lumen 03/17/20 Anterior;Proximal;Right Forearm 6 days         Peripheral IV - Single Lumen 03/22/20 1130 22 G Left Hand 1 day         Peripheral IV - Single Lumen 03/22/20 1236 22 G Left Forearm 1 day              Significant Labs:    CBC/Anemia Profile:  Recent Labs   Lab 03/22/20  0300 03/23/20  0106   WBC 5.14 5.37   HGB 11.1* 10.2*   HCT 37.5 34.9*    285   MCV 87 86   RDW 13.6 13.4        Chemistries:  Recent Labs   Lab 03/22/20  0300 03/22/20  2034 03/23/20  0106    137 138   K 4.1 4.3 4.2   CL 98 98 100   CO2 28 29 27   BUN 22* 32* 28*   CREATININE 0.8 1.1 0.9   CALCIUM 8.7 8.8 8.6*   ALBUMIN 2.4*  --  2.2*   PROT 6.7  --  6.2   BILITOT 0.4  --  0.4   ALKPHOS 54*  --  54*   ALT 14  --  13   AST 15  --  16   MG 2.3 2.2 2.2   PHOS 4.1  --  4.9*     Micro:  BCX and Resp CX show NGTD, urine culture pending    Significant Imaging:  No new imaging to report from last progress note      ABG  Recent Labs   Lab 03/22/20  0607   PH 7.518*   PO2 65*   PCO2 41.2   HCO3 33.5*   BE 11     Assessment/Plan:     Pulmonary  * Acute respiratory failure with CoVID19 +  Pt intubated on 3/15 (vent day 7), transferred from [facility] on 3/17 for ARDS 2/2 positive COVID 19 from [facility] labs.   On transfer: , ferritin 368, D-Dimer >33. CRP down trending (353 at admit, 24.1 on 3/23).   Patient has required  paralysis and proning multiple times since admission.     Plan:  - 50/12 Vent settings currently, attempt to wean to 50/10, but not ready for SBT yet. Will reassess daily  - continue methylprednisolone 40mg daily, keeping in mind its effect on patient's BG  - Off propofol and versed, will attempt to weaning fentanyl, precedex  - Plaquenil complete, awaiting Remdesivir acceptance  - Completed 3 days azithromycin, off now  - f/u daily CRP, trending down to 20.4 today  - Diuresis with 40 Q6H lasix as patient about 6L positive over admission, to remove volume-based contribution to ARDS     Cardiac/Vascular  Essential hypertension  Home med includes lisinopril 20mg daily & lasix 40mg daily.     Plan:  - Attempted to D/C cardene at 0530 today, but patient's BP rebounded to SBP 180s so was restarted at 0700. Will continue to wean  - Coreg 25mg BID, Q6h hydralazine 100mg scheduled on 3/21. Added Amlodipine 10 mg qd, Isosorbide dinitrate 20 TID, and Imdur qd on 3/22  - 40MG IV lasix Q6h (pt 5931L pos since admission), monitoring UOP closely.     Endocrine  Type 2 diabetes mellitus without complication, without long-term current use of insulin  Pt has A1c 12.4 (3/17/20) & home med is metformin 500mg BID.     Plan:  Required 174U total insulin  - On insulin gtt at 4U/hr  - accuchecks Q4h    - Detemir 30U BID, will transition off gtt once patient's BGL<200. Nursing to call team when BGLs drop below 200 to d/c gtt  - MDSSI  - Watch K for shifts       Critical Care Daily Checklist:    A: Awake: RASS Goal/Actual Goal: RASS Goal: -4-->deep sedation  Actual: Cowan Agitation Sedation Scale (RASS): Moderate sedation   B: Spontaneous Breathing Trial Performed? Spon. Breathing Trial Initiated?: Not initiated (03/23/20 0818) Not ready for SBT yet   C: SAT & SBT Coordinated?  Not ready for SBT                 D: Delirium: CAM-ICU Overall CAM-ICU: Positive   E: Early Mobility Performed? No, not applicable   F: Feeding Goal: Goals: Meet  % EEN, EPN by RD f/u date  Status: Nutrition Goal Status: progressing towards goal   Current Diet Order   No orders of the defined types were placed in this encounter.      AS: Analgesia/Sedation Precedex 1.4 mcg/kg/hr, fentanyl 350 mcg/hr, off Versed and propofol after 3/22   T: Thromboembolic Prophylaxis Enoxaparin 40mg BID SC   H: HOB > 300 Yes   U: Stress Ulcer Prophylaxis (if needed) Famotidine 20 mg BID IV   G: Glucose Control BG goal 140-180   B: Bowel Function Stool Occurrence: 0   I: Indwelling Catheter (Lines & Richard) Necessity A-line, 5x P-IV, NG/OG tube, richard replaced yesterday   D: De-escalation of Antimicrobials/Pharmacotherapies Off abx now, still on stress steroids, plaquenil complete    Plan for the day/ETD 50/12 --> 50/10, wean Cardene, wean sedation, aim towards SBT/SAT, control -180    Code Status:  Family/Goals of Care: Full Code  Will update family today       Critical secondary to Patient has a condition that poses threat to life and bodily function: Severe Respiratory Distress      Critical care was time spent personally by me on the following activities: development of treatment plan with patient or surrogate and bedside caregivers, discussions with consultants, evaluation of patient's response to treatment, examination of patient, ordering and performing treatments and interventions, ordering and review of laboratory studies, ordering and review of radiographic studies, pulse oximetry, re-evaluation of patient's condition. This critical care time did not overlap with that of any other provider or involve time for any procedures.     Sara Justice MD  Critical Care Medicine  [facility]

## 2020-03-23 NOTE — ASSESSMENT & PLAN NOTE
Home med includes lisinopril 20mg daily & lasix 40mg daily.     Plan:  - Attempted to D/C cardene at 0530 today, but patient's BP rebounded to SBP 180s so was restarted at 0700. Patient then awoke today and BP/HR rise to 180s SBP and 150s hr, so fentanyl was increased 275 to 300, cardene to 2.5. Patient's BP then dropped too low at 90s/60s so Cardene D/C'd, BP stable at 130s/80s will hold cardene unless SBP rises >180  - Coreg 25mg BID, Q6h hydralazine 100mg scheduled on 3/21. Added Amlodipine 10 mg qd, Isosorbide dinitrate 20 TID, and Imdur qd on 3/22  - 40MG IV lasix Q6h (pt 5931L pos since admission), monitoring UOP closely.

## 2020-03-23 NOTE — SUBJECTIVE & OBJECTIVE
Interval History/Significant Events: Vent day 7 ICU day 7 Attempted to D/C cardene at 0530 (also on Coareg 25 BID, Hydralazine 100mg Q8H, Norvasc 10mg QD), but patient's BP promptly amado to SBP 180s, thus cardene restarted at 2.5 at 0700. Remains on Precedex 1.4, Fentanyl 275. Insulin drip at 4U/Hr, BG ranges 320s-300s (goal 140-180). No other acute events noted.    Today, during rounds patient opening eyes, with tachycardia to 150s, SBP to 180s, unclear whether due to pain, agitation, or delirium. Not pulling at lines/tubes/richard. Fentanyl raised to 350, Cardene temporarily D/C'd due to resultant low BP (90s/60s).    Review of Systems   Unable to perform ROS: Intubated     Objective:     Vital Signs (Most Recent):  Temp: 97 °F (36.1 °C) (03/23/20 0800)  Pulse: (!) 135 (03/23/20 1248)  Resp: 19 (03/23/20 1248)  BP: (!) 206/94 (03/23/20 1237)  SpO2: 96 % (03/23/20 1248) Vital Signs (24h Range):  Temp:  [97 °F (36.1 °C)-98.6 °F (37 °C)] 97 °F (36.1 °C)  Pulse:  [] 135  Resp:  [18-19] 19  SpO2:  [93 %-99 %] 96 %  BP: (122-206)/(66-94) 206/94  Arterial Line BP: (119-198)/(63-97) 167/95   Weight: (!) 156.8 kg (345 lb 10.9 oz)  Body mass index is 48.21 kg/m².      Intake/Output Summary (Last 24 hours) at 3/23/2020 1311  Last data filed at 3/23/2020 1100  Gross per 24 hour   Intake 2452.34 ml   Output 6950 ml   Net -4497.66 ml       Physical Exam Please refer to attending/fellow attestation, exam not performed by me to reduce infection/exposure risk (COVID POS status)    Vents:  Vent Mode: A/C (03/23/20 1248)  Ventilator Initiated: Yes (03/17/20 1619)  Set Rate: 18 BPM (03/23/20 1248)  Vt Set: 450 mL (03/23/20 1248)  Pressure Support: 3 cmH20 (03/22/20 0717)  PEEP/CPAP: 10 cmH20 (03/23/20 1248)  Oxygen Concentration (%): 40 (03/23/20 1248)  Peak Airway Pressure: 37 cmH2O (03/23/20 1248)  Plateau Pressure: 28 cmH20 (03/22/20 1226)  Total Ve: 8.08 mL (03/23/20 1248)  F/VT Ratio<105 (RSBI): (!) 41.3 (03/23/20  1248)  Lines/Drains/Airways     Central Venous Catheter Line            Percutaneous Central Line Insertion/Assessment - Triple Lumen  03/17/20 1100 right internal jugular 6 days          Drain                 NG/OG Tube 03/17/20  Right mouth 6 days         Urethral Catheter 03/22/20 1700 less than 1 day          Airway                 Airway - Non-Surgical -- days          Arterial Line                 Arterial Line 03/18/20 1344 Right Radial 4 days          Peripheral Intravenous Line                 Peripheral IV - Single Lumen 03/17/20  Anterior;Distal;Right Forearm 6 days         Peripheral IV - Single Lumen 03/17/20 Anterior;Proximal;Right Forearm 6 days         Peripheral IV - Single Lumen 03/22/20 1130 22 G Left Hand 1 day         Peripheral IV - Single Lumen 03/22/20 1236 22 G Left Forearm 1 day              Significant Labs:    CBC/Anemia Profile:  Recent Labs   Lab 03/22/20  0300 03/23/20  0106   WBC 5.14 5.37   HGB 11.1* 10.2*   HCT 37.5 34.9*    285   MCV 87 86   RDW 13.6 13.4        Chemistries:  Recent Labs   Lab 03/22/20  0300 03/22/20  2034 03/23/20  0106    137 138   K 4.1 4.3 4.2   CL 98 98 100   CO2 28 29 27   BUN 22* 32* 28*   CREATININE 0.8 1.1 0.9   CALCIUM 8.7 8.8 8.6*   ALBUMIN 2.4*  --  2.2*   PROT 6.7  --  6.2   BILITOT 0.4  --  0.4   ALKPHOS 54*  --  54*   ALT 14  --  13   AST 15  --  16   MG 2.3 2.2 2.2   PHOS 4.1  --  4.9*     Micro:  BCX and Resp CX show NGTD, urine culture pending    Significant Imaging:  No new imaging to report from last progress note

## 2020-03-23 NOTE — PROGRESS NOTES
1700: richard exchanged d/t urine sediment noted in richard & growth in line, during change foul urine noted- post new richard place ~1.5L collected in richard. MD Nahomi updated- telephone order UA with [facility]. readback x2

## 2020-03-23 NOTE — ASSESSMENT & PLAN NOTE
I called but had to leave a message.  I just wanted to check in to see how Don is doing and to see what they will need from me now.  He does not have a f/u appt with me now - do we need to scheduled one for 1 month?    I told Dinora she would not be able to get me now, but that I would be back in the office tomorrow.   Pt is 29yoF w/ PMH DM2, HTN, and benign intracranial HTN transferred to [facility] on 3/17 for possible ECMOAs per chart review and info from OSH pt had rhinorrhea & dry cough for several days- went to urgent care- was discharged home on amoxicillin. A few days later she was continuing to have fevers as high as 103 & was having worsening SOB at rest- went to [facility]  3/13 with SOB, fevers, and cough.   Was on 2L oxygen. Labs on presentation were CRP 18.2, WBC 3.7, flu negative. Pt was admitted to medicine & started on empiric CAP coverage. Pt's condition continued to deteriorate- worsening leukopenia, lymphopenia, elevated LDH, & CRP, & increasing oxygen requirements. CXR showed worsening multifocal airspace disease. Pt in ARDS. Pt was transferred to MICU on 3/14. Pt decompensated & was intubated on 3/15 & on 3/16 results came back positive for Covid 19. Echo showed normal LV function (EF 60%). Pt was also started on methylprednisolone. 3/17 transferred to [facility]. ID consulted for COVID 19 management.   Hydroxychloroquine started 3/18- course completed 3/22, off Azithro, CFTX for 3 days    Recommendations  --S/P 5-day course of hydroxychloroquine, noted off Azithro also  -- Continue COVID 19 isolation protocol, limit visitors  -- Remdesivir approved and received, plan to start course of 10 days. 200mg IV today and then 100mg IV daily for 9 days

## 2020-03-23 NOTE — ASSESSMENT & PLAN NOTE
Pt intubated on 3/15 (vent day 7), transferred from [facility] on 3/17 for ARDS 2/2 positive COVID 19 from [facility] labs.   On transfer: , ferritin 368, D-Dimer >33. CRP down trending (353 at admit, 24.1 on 3/23).   Patient has required paralysis and proning multiple times since admission.  Patient awoke this afternoon around 1200 that followed with HR rise to 150s SBP to 180s, so fentanyl was increased to 350 from 275 and cardene increased to 5 from 2.5. After this episode, BP dropped below 90/60 so Cardene D/C'd temporarily, but will restart if SBP > 180    Plan:  - 50/12 Vent settings currently, attempt to wean to 50/10 today, but not ready for SBT yet. Will reassess daily  - continue methylprednisolone 40mg daily, keeping in mind its effect on patient's BG  - Off propofol and versed, will attempt to wean fentanyl, precedex  - 3% nebulized saline to thin respiratory secretions  - Plaquenil complete, awaiting Remdesivir acceptance  - Completed 3d of rocephin and azithromycin, off now  - f/u daily CRP, trending down to 20.4 today  - Diuresis with 40 Q6H lasix as patient about 6L positive over admission, to remove volume-based contribution to ARDS

## 2020-03-23 NOTE — PROGRESS NOTES
Spoke to mother Ms. Carney and updated on pt's plan of care. Pt waking up intermittently despite significant amount of sedation. Pt reassured and vitals remain WNL when sedated. TF on hold for residuals <400.

## 2020-03-23 NOTE — TELEPHONE ENCOUNTER
Notified that remdesivir has arrived.  Called patient's mother and obtained phone consent.  Will scan to patient's chart.  Will coordinate getting it administered.

## 2020-03-23 NOTE — PROGRESS NOTES
[facility]  Infectious Disease  Progress Note    Patient Name: Riri Gonsalves  MRN: [id number]  Admission Date: 3/17/2020  Length of Stay: 6 days  Attending Physician: Ankush Shaikh*  Primary Care Provider: Jocelynn Brooks MD    Isolation Status: Airborne and Contact and Droplet  Assessment/Plan:      * Acute respiratory failure with CoVID19 +  Pt is 29yoF w/ PMH DM2, HTN, and benign intracranial HTN transferred to [facility] on 3/17 for possible ECMOAs per chart review and info from OSH pt had rhinorrhea & dry cough for several days- went to urgent care- was discharged home on amoxicillin. A few days later she was continuing to have fevers as high as 103 & was having worsening SOB at rest- went to [facility]  3/13 with SOB, fevers, and cough.   Was on 2L oxygen. Labs on presentation were CRP 18.2, WBC 3.7, flu negative. Pt was admitted to medicine & started on empiric CAP coverage. Pt's condition continued to deteriorate- worsening leukopenia, lymphopenia, elevated LDH, & CRP, & increasing oxygen requirements. CXR showed worsening multifocal airspace disease. Pt in ARDS. Pt was transferred to MICU on 3/14. Pt decompensated & was intubated on 3/15 & on 3/16 results came back positive for Covid 19. Echo showed normal LV function (EF 60%). Pt was also started on methylprednisolone. 3/17 transferred to [facility]. ID consulted for COVID 19 management.   Hydroxychloroquine started 3/18- course completed 3/22, off Azithro, CFTX for 3 days    Recommendations  --S/P 5-day course of hydroxychloroquine, noted off Azithro also  -- Continue COVID 19 isolation protocol, limit visitors  -- Remdesivir approved and received, plan to start course of 10 days. 200mg IV today and then 100mg IV daily for 9 days      Case discussed with Dr Egan    Thank you for your consult. I will follow-up with patient. Please contact us if you have any additional questions.    Floyd Witt MD   ID Fellow  Infectious  Disease  [facility]    Subjective:     Principal Problem:Acute respiratory failure    HPI: History obtained from review of chart.  Pt is 28 y/o female w/ PMH DM2, HTN, and benign intracranial HTN who presented to [facility] on 3/13 complaining of shortness of breath, fevers, and cough X several days.  Prior to her presentation, she had been treated with amoxicillin with limited benefit.    She was admitted at [facility]. Pt's condition continued to deteriorate- worsening leukopenia, lymphopenia, elevated LDH, & CRP, & increasing oxygen requirements. CXR showed worsening multifocal airspace disease. Pt was transferred to MICU on 3/14. Pt decompensated &was intubated on 3/15 & on 3/16 results came back positive for Covid 19. Pt presents as transfer from [facility] ICU for possible ecmo.  Interval History: intubated and sedated, on cardene drip high BP, 40% and 10 PEEP    Review of Systems   Unable to perform ROS: Intubated     Objective:     Vital Signs (Most Recent):  Temp: 97 °F (36.1 °C) (03/23/20 1600)  Pulse: 89 (03/23/20 1800)  Resp: 18 (03/23/20 1800)  BP: (!) 150/84 (03/23/20 1700)  SpO2: (!) 94 % (03/23/20 1800) Vital Signs (24h Range):  Temp:  [97 °F (36.1 °C)-97.2 °F (36.2 °C)] 97 °F (36.1 °C)  Pulse:  [] 89  Resp:  [18-21] 18  SpO2:  [93 %-99 %] 94 %  BP: (109-206)/(56-94) 150/84  Arterial Line BP: (119-198)/() 144/69     Weight: (!) 156.8 kg (345 lb 10.9 oz)  Body mass index is 48.21 kg/m².    Estimated Creatinine Clearance: 155.9 mL/min (based on SCr of 0.9 mg/dL).    Physical Exam   Nursing note and vitals reviewed.  Patient not examined because of CoVID19 isolation to limit exposure and use of PPE, discussed with RN      Significant Labs:   CBC:   Recent Labs   Lab 03/22/20  0300 03/23/20  0106   WBC 5.14 5.37   HGB 11.1* 10.2*   HCT 37.5 34.9*    285     CMP:   Recent Labs   Lab 03/22/20  0300 03/22/20 2034 03/23/20 0106    137 138   K 4.1 4.3 4.2   CL 98 98 100    CO2 28 29 27   * 376* 322*   BUN 22* 32* 28*   CREATININE 0.8 1.1 0.9   CALCIUM 8.7 8.8 8.6*   PROT 6.7  --  6.2   ALBUMIN 2.4*  --  2.2*   BILITOT 0.4  --  0.4   ALKPHOS 54*  --  54*   AST 15  --  16   ALT 14  --  13   ANIONGAP 13 10 11   EGFRNONAA >60.0 >60.0 >60.0     Microbiology Results (last 7 days)     Procedure Component Value Units Date/Time    Culture, Respiratory with Gram Stain [[id number]] Collected:  03/20/20 0119    Order Status:  Completed Specimen:  Respiratory from Endotracheal Aspirate Updated:  03/23/20 1035     Respiratory Culture Normal respiratory prasanth      No S aureus or Pseudomonas isolated.     Gram Stain (Respiratory) <10 epithelial cells per low power field.     Gram Stain (Respiratory) Rare WBC's     Gram Stain (Respiratory) No organisms seen    Blood culture [[id number]] Collected:  03/19/20 2300    Order Status:  Completed Specimen:  Blood from Peripheral, Hand, Left Updated:  03/23/20 0612     Blood Culture, Routine No Growth to date      No Growth to date      No Growth to date      No Growth to date    Blood culture [[id number]] Collected:  03/19/20 2300    Order Status:  Completed Specimen:  Blood from Peripheral, Forearm, Left Updated:  03/23/20 0612     Blood Culture, Routine No Growth to date      No Growth to date      No Growth to date      No Growth to date    Culture, Respiratory with Gram Stain [[id number]] Collected:  03/19/20 2201    Order Status:  Canceled Specimen:  Respiratory from Tracheal Aspirate     Influenza A & B by Molecular [[id number]] Collected:  03/17/20 1613    Order Status:  Completed Specimen:  Nasopharyngeal Swab Updated:  03/17/20 1654     Influenza A, Molecular Negative     Influenza B, Molecular Negative     Flu A & B Source np        All pertinent labs within the past 24 hours have been reviewed.    Significant Imaging: I have reviewed all pertinent imaging results/findings within the past 24 hours.

## 2020-03-24 PROBLEM — R74.8 ELEVATED LIPASE: Status: ACTIVE | Noted: 2020-03-24

## 2020-03-24 LAB
ALBUMIN SERPL BCP-MCNC: 2.1 G/DL (ref 3.5–5.2)
ALLENS TEST: ABNORMAL
ALP SERPL-CCNC: 53 U/L (ref 55–135)
ALT SERPL W/O P-5'-P-CCNC: 14 U/L (ref 10–44)
ANION GAP SERPL CALC-SCNC: 11 MMOL/L (ref 8–16)
AST SERPL-CCNC: 21 U/L (ref 10–40)
BASOPHILS # BLD AUTO: 0 K/UL (ref 0–0.2)
BASOPHILS NFR BLD: 0 % (ref 0–1.9)
BILIRUB SERPL-MCNC: 0.4 MG/DL (ref 0.1–1)
BUN SERPL-MCNC: 19 MG/DL (ref 6–20)
CALCIUM SERPL-MCNC: 8.9 MG/DL (ref 8.7–10.5)
CHLORIDE SERPL-SCNC: 99 MMOL/L (ref 95–110)
CO2 SERPL-SCNC: 32 MMOL/L (ref 23–29)
CREAT SERPL-MCNC: 0.7 MG/DL (ref 0.5–1.4)
CRP SERPL-MCNC: 22.5 MG/L (ref 0–8.2)
DELSYS: ABNORMAL
DIFFERENTIAL METHOD: ABNORMAL
EOSINOPHIL # BLD AUTO: 0 K/UL (ref 0–0.5)
EOSINOPHIL NFR BLD: 0.4 % (ref 0–8)
ERYTHROCYTE [DISTWIDTH] IN BLOOD BY AUTOMATED COUNT: 13.6 % (ref 11.5–14.5)
ERYTHROCYTE [SEDIMENTATION RATE] IN BLOOD BY WESTERGREN METHOD: 18 MM/H
EST. GFR  (AFRICAN AMERICAN): >60 ML/MIN/1.73 M^2
EST. GFR  (NON AFRICAN AMERICAN): >60 ML/MIN/1.73 M^2
FIO2: 40
GLUCOSE SERPL-MCNC: 159 MG/DL (ref 70–110)
HCO3 UR-SCNC: 37.8 MMOL/L (ref 24–28)
HCT VFR BLD AUTO: 35.9 % (ref 37–48.5)
HGB BLD-MCNC: 10.6 G/DL (ref 12–16)
IMM GRANULOCYTES # BLD AUTO: 0.05 K/UL (ref 0–0.04)
IMM GRANULOCYTES NFR BLD AUTO: 0.9 % (ref 0–0.5)
LACTATE SERPL-SCNC: 1 MMOL/L (ref 0.5–2.2)
LIPASE SERPL-CCNC: 210 U/L (ref 4–60)
LYMPHOCYTES # BLD AUTO: 1.3 K/UL (ref 1–4.8)
LYMPHOCYTES NFR BLD: 23.5 % (ref 18–48)
MAGNESIUM SERPL-MCNC: 1.6 MG/DL (ref 1.6–2.6)
MCH RBC QN AUTO: 25.1 PG (ref 27–31)
MCHC RBC AUTO-ENTMCNC: 29.5 G/DL (ref 32–36)
MCV RBC AUTO: 85 FL (ref 82–98)
MODE: ABNORMAL
MONOCYTES # BLD AUTO: 0.3 K/UL (ref 0.3–1)
MONOCYTES NFR BLD: 5.8 % (ref 4–15)
NEUTROPHILS # BLD AUTO: 3.8 K/UL (ref 1.8–7.7)
NEUTROPHILS NFR BLD: 69.4 % (ref 38–73)
NRBC BLD-RTO: 0 /100 WBC
PCO2 BLDA: 48.5 MMHG (ref 35–45)
PEEP: 10
PH SMN: 7.5 [PH] (ref 7.35–7.45)
PHOSPHATE SERPL-MCNC: 2.9 MG/DL (ref 2.7–4.5)
PLATELET # BLD AUTO: 273 K/UL (ref 150–350)
PMV BLD AUTO: 10.5 FL (ref 9.2–12.9)
PO2 BLDA: 76 MMHG (ref 80–100)
POC BE: 15 MMOL/L
POC SATURATED O2: 96 % (ref 95–100)
POC TCO2: 39 MMOL/L (ref 23–27)
POCT GLUCOSE: 118 MG/DL (ref 70–110)
POCT GLUCOSE: 170 MG/DL (ref 70–110)
POCT GLUCOSE: 196 MG/DL (ref 70–110)
POCT GLUCOSE: 203 MG/DL (ref 70–110)
POCT GLUCOSE: 214 MG/DL (ref 70–110)
POCT GLUCOSE: 232 MG/DL (ref 70–110)
POCT GLUCOSE: 250 MG/DL (ref 70–110)
POTASSIUM SERPL-SCNC: 3.1 MMOL/L (ref 3.5–5.1)
PROT SERPL-MCNC: 6.1 G/DL (ref 6–8.4)
RBC # BLD AUTO: 4.22 M/UL (ref 4–5.4)
SAMPLE: ABNORMAL
SITE: ABNORMAL
SODIUM SERPL-SCNC: 142 MMOL/L (ref 136–145)
VT: 450
WBC # BLD AUTO: 5.48 K/UL (ref 3.9–12.7)

## 2020-03-24 PROCEDURE — 25000003 PHARM REV CODE 250: Performed by: STUDENT IN AN ORGANIZED HEALTH CARE EDUCATION/TRAINING PROGRAM

## 2020-03-24 PROCEDURE — 82803 BLOOD GASES ANY COMBINATION: CPT

## 2020-03-24 PROCEDURE — 94660 CPAP INITIATION&MGMT: CPT

## 2020-03-24 PROCEDURE — 94150 VITAL CAPACITY TEST: CPT

## 2020-03-24 PROCEDURE — 20000000 HC ICU ROOM

## 2020-03-24 PROCEDURE — 83735 ASSAY OF MAGNESIUM: CPT

## 2020-03-24 PROCEDURE — 99900035 HC TECH TIME PER 15 MIN (STAT)

## 2020-03-24 PROCEDURE — 25000003 PHARM REV CODE 250: Performed by: INTERNAL MEDICINE

## 2020-03-24 PROCEDURE — 84100 ASSAY OF PHOSPHORUS: CPT

## 2020-03-24 PROCEDURE — 94640 AIRWAY INHALATION TREATMENT: CPT

## 2020-03-24 PROCEDURE — C9399 UNCLASSIFIED DRUGS OR BIOLOG: HCPCS | Performed by: STUDENT IN AN ORGANIZED HEALTH CARE EDUCATION/TRAINING PROGRAM

## 2020-03-24 PROCEDURE — 86140 C-REACTIVE PROTEIN: CPT

## 2020-03-24 PROCEDURE — 63600175 PHARM REV CODE 636 W HCPCS: Performed by: STUDENT IN AN ORGANIZED HEALTH CARE EDUCATION/TRAINING PROGRAM

## 2020-03-24 PROCEDURE — 37799 UNLISTED PX VASCULAR SURGERY: CPT

## 2020-03-24 PROCEDURE — 99233 PR SUBSEQUENT HOSPITAL CARE,LEVL III: ICD-10-PCS | Mod: ,,, | Performed by: INTERNAL MEDICINE

## 2020-03-24 PROCEDURE — 94770 HC EXHALED C02 TEST: CPT

## 2020-03-24 PROCEDURE — 83605 ASSAY OF LACTIC ACID: CPT

## 2020-03-24 PROCEDURE — 94761 N-INVAS EAR/PLS OXIMETRY MLT: CPT

## 2020-03-24 PROCEDURE — 99233 SBSQ HOSP IP/OBS HIGH 50: CPT | Mod: ,,, | Performed by: INTERNAL MEDICINE

## 2020-03-24 PROCEDURE — 27200966 HC CLOSED SUCTION SYSTEM

## 2020-03-24 PROCEDURE — 99900026 HC AIRWAY MAINTENANCE (STAT)

## 2020-03-24 PROCEDURE — 83690 ASSAY OF LIPASE: CPT

## 2020-03-24 PROCEDURE — 80053 COMPREHEN METABOLIC PANEL: CPT

## 2020-03-24 PROCEDURE — 27000190 HC CPAP FULL FACE MASK W/VALVE

## 2020-03-24 PROCEDURE — 85025 COMPLETE CBC W/AUTO DIFF WBC: CPT

## 2020-03-24 PROCEDURE — 25000242 PHARM REV CODE 250 ALT 637 W/ HCPCS: Performed by: STUDENT IN AN ORGANIZED HEALTH CARE EDUCATION/TRAINING PROGRAM

## 2020-03-24 PROCEDURE — 27000221 HC OXYGEN, UP TO 24 HOURS

## 2020-03-24 PROCEDURE — 63600175 PHARM REV CODE 636 W HCPCS: Performed by: INTERNAL MEDICINE

## 2020-03-24 RX ORDER — SODIUM,POTASSIUM PHOSPHATES 280-250MG
2 POWDER IN PACKET (EA) ORAL ONCE
Status: COMPLETED | OUTPATIENT
Start: 2020-03-24 | End: 2020-03-24

## 2020-03-24 RX ORDER — POTASSIUM CHLORIDE 20 MEQ/15ML
40 SOLUTION ORAL 2 TIMES DAILY
Status: DISCONTINUED | OUTPATIENT
Start: 2020-03-24 | End: 2020-03-25

## 2020-03-24 RX ORDER — HYDRALAZINE HYDROCHLORIDE 20 MG/ML
10 INJECTION INTRAMUSCULAR; INTRAVENOUS EVERY 4 HOURS PRN
Status: DISCONTINUED | OUTPATIENT
Start: 2020-03-24 | End: 2020-03-30 | Stop reason: HOSPADM

## 2020-03-24 RX ADMIN — INSULIN DETEMIR 40 UNITS: 100 INJECTION, SOLUTION SUBCUTANEOUS at 10:03

## 2020-03-24 RX ADMIN — ENOXAPARIN SODIUM 40 MG: 100 INJECTION SUBCUTANEOUS at 08:03

## 2020-03-24 RX ADMIN — NICARDIPINE HYDROCHLORIDE 5 MG/HR: 0.2 INJECTION, SOLUTION INTRAVENOUS at 04:03

## 2020-03-24 RX ADMIN — ISOSORBIDE DINITRATE 10 MG: 10 TABLET ORAL at 08:03

## 2020-03-24 RX ADMIN — SODIUM CHLORIDE 30 MG/ML INHALATION SOLUTION 4 ML: 30 SOLUTION INHALANT at 07:03

## 2020-03-24 RX ADMIN — HYDRALAZINE HYDROCHLORIDE 100 MG: 50 TABLET ORAL at 05:03

## 2020-03-24 RX ADMIN — CHLORHEXIDINE GLUCONATE 0.12% ORAL RINSE 15 ML: 1.2 LIQUID ORAL at 09:03

## 2020-03-24 RX ADMIN — ENOXAPARIN SODIUM 40 MG: 100 INJECTION SUBCUTANEOUS at 09:03

## 2020-03-24 RX ADMIN — SODIUM CHLORIDE 30 MG/ML INHALATION SOLUTION 4 ML: 30 SOLUTION INHALANT at 01:03

## 2020-03-24 RX ADMIN — DEXMEDETOMIDINE HYDROCHLORIDE 1.4 MCG/KG/HR: 100 INJECTION, SOLUTION, CONCENTRATE INTRAVENOUS at 04:03

## 2020-03-24 RX ADMIN — ISOSORBIDE DINITRATE 20 MG: 10 TABLET ORAL at 11:03

## 2020-03-24 RX ADMIN — INSULIN DETEMIR 40 UNITS: 100 INJECTION, SOLUTION SUBCUTANEOUS at 08:03

## 2020-03-24 RX ADMIN — AMLODIPINE BESYLATE 10 MG: 10 TABLET ORAL at 09:03

## 2020-03-24 RX ADMIN — POTASSIUM & SODIUM PHOSPHATES POWDER PACK 280-160-250 MG 2 PACKET: 280-160-250 PACK at 09:03

## 2020-03-24 RX ADMIN — FUROSEMIDE 80 MG: 10 INJECTION, SOLUTION INTRAVENOUS at 09:03

## 2020-03-24 RX ADMIN — DEXMEDETOMIDINE HYDROCHLORIDE 1 MCG/KG/HR: 100 INJECTION, SOLUTION, CONCENTRATE INTRAVENOUS at 10:03

## 2020-03-24 RX ADMIN — CARVEDILOL 25 MG: 25 TABLET, FILM COATED ORAL at 09:03

## 2020-03-24 RX ADMIN — INSULIN ASPART 4 UNITS: 100 INJECTION, SOLUTION INTRAVENOUS; SUBCUTANEOUS at 08:03

## 2020-03-24 RX ADMIN — FAMOTIDINE 20 MG: 20 TABLET, FILM COATED ORAL at 09:03

## 2020-03-24 RX ADMIN — FUROSEMIDE 80 MG: 10 INJECTION, SOLUTION INTRAVENOUS at 08:03

## 2020-03-24 RX ADMIN — POTASSIUM CHLORIDE 40 MEQ: 20 SOLUTION ORAL at 09:03

## 2020-03-24 RX ADMIN — METHYLPREDNISOLONE SODIUM SUCCINATE 20 MG: 40 INJECTION, POWDER, FOR SOLUTION INTRAMUSCULAR; INTRAVENOUS at 09:03

## 2020-03-24 RX ADMIN — DOCUSATE SODIUM - SENNOSIDES 1 TABLET: 50; 8.6 TABLET, FILM COATED ORAL at 09:03

## 2020-03-24 RX ADMIN — ISOSORBIDE DINITRATE 20 MG: 10 TABLET ORAL at 05:03

## 2020-03-24 RX ADMIN — INSULIN ASPART 2 UNITS: 100 INJECTION, SOLUTION INTRAVENOUS; SUBCUTANEOUS at 05:03

## 2020-03-24 RX ADMIN — HYDRALAZINE HYDROCHLORIDE 100 MG: 50 TABLET ORAL at 11:03

## 2020-03-24 RX ADMIN — INSULIN ASPART 2 UNITS: 100 INJECTION, SOLUTION INTRAVENOUS; SUBCUTANEOUS at 01:03

## 2020-03-24 NOTE — PROGRESS NOTES
[facility]  Critical Care Medicine  Progress Note    Patient Name: Riri Gonsalves  MRN: [id number]  Admission Date: 3/17/2020  Hospital Length of Stay: 7 days  Code Status: Full Code  Attending Provider: Ankush Shaikh*  Primary Care Provider: Jocelynn Brooks MD   Principal Problem: Acute respiratory failure    Subjective:     HPI:  Pt is 29yoF w/ PMH DM2, HTN, and benign intracranial HTN who presented to [facility] on 3/13 complaining of shortness of breath, fevers, and cough. 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 A few days later she was continuing to have fevers as high as 103 & was having worsening SOB at rest- went to [facility] ED & was initially feeling better on 2L NC. Labs on presentation were CRP 18.2, WBC 3.7, flu negative. Pt was admitted to medicine & started on empiric CAP coverage. Pt's condition continued to deteriorate- worsening leukopenia, lymphopenia, elevated LDH, & CRP, & increasing oxygen requirements. CXR showed worsening multifocal airspace disease. Pt was transferred to MICU on 3/14. Pt decompensated &was intubated on 3/15 & on 3/16 results came back positive for Covid 19. Echo showed normal LV function (EF 60%)Pt required fentanyl, precedex, and propofol. Pt also started on methylprednisolone. Pt was proned on 3/16. Pt was supinated prior to transfer.   Pt presents as transfer from [facility] ICU for possible ecmo.     Hospital/ICU Course:  3/17 - Transferred from [facility] ICU, was pronated at 1745 on 3/17. Cardine started for HTN and insulin drip for hyperglycemia (Hx uncontrolled T2DM)  3/18 - Pt supinated at 1100.  3/22 - Vent day 6 ICU day 6: increased urine sedimentation, foul smelling urine, thus richard was exchanged, with 1.5L output, and cultures sent  3/23- Vent day 7 ICU day  7: received 1st dose of Remdesivir 200mg at around 2000, diuresis continues. Given concern for propofol-induced hypertriglyceridemia, lipase 230 yesterday. Insulin drip continued at 4U/Hr with K replenishment PRN. Attempted to D/C cardene at 0530 (also on Coareg 25 BID, Hydralazine 100mg Q8H, Norvasc 10mg QD, Isosorbide 20 TID), but patient's BP promptly amado to SBP 180s, thus cardene restarted at 2.5 at 0700. Remains on Precedex 1.4, Fentanyl 275. Insulin drip at 4U/Hr, BG ranges 320s-300s (goal 140-180). No other acute events noted.    Patient with hypertriglyceridemia likely 2/2 propofol. Propofol D/C'd 3/19  (Highest at 895 3/19, reduced at 574 3/23)      Interval History/Significant Events: No acute events overnight. Receiving 2nd dose of remdesivir at 1800 today. Insulin drip continued with KCl replenishment. Lipase of 230 from yesterday ordered in light of hypertriglyceridemia (highest value 895 at 3/19, 574 at 3/23) 2/2 propofol use (off propofol now) out of concern for pancreatitis, but staff notes patient not grimacing with abdominal palpation, and shaking head when asked if in pain.     ABG this AM showing metabolic alkalosis pH 7.50, PCO2 48.5, PO2 76, BMP HCO3 32, with some respiratory compensation, P/F ratio 217 (at FiO2 35%).  CRP still downtrending from admit 353.4 --> 22.5 today. Lymph% now 25%, with normal WBC/PLT, stable H&H.    Review of Systems   Unable to perform ROS: Intubated     Objective:     Vital Signs (Most Recent):  Temp: 97.6 °F (36.4 °C) (03/23/20 2000)  Pulse: 86 (03/24/20 1145)  Resp: (!) 25 (03/24/20 1145)  BP: 93/62 (03/24/20 1145)  SpO2: (!) 89 % (03/24/20 1145) Vital Signs (24h Range):  Temp:  [97 °F (36.1 °C)-97.6 °F (36.4 °C)] 97.6 °F (36.4 °C)  Pulse:  [] 86  Resp:  [16-25] 25  SpO2:  [89 %-99 %] 89 %  BP: ()/(56-93) 93/62  Arterial Line BP: (121-175)/() 152/86   Weight: (!) 156.8 kg (345 lb 10.9 oz)  Body mass index is 48.21 kg/m².      Intake/Output  Summary (Last 24 hours) at 3/24/2020 1254  Last data filed at 3/24/2020 1100  Gross per 24 hour   Intake 3060.24 ml   Output 5125 ml   Net -2064.76 ml       Physical Exam    Vents:  Vent Mode: Spont (03/24/20 0825)  Ventilator Initiated: Yes (03/17/20 1619)  Set Rate: 18 BPM (03/24/20 0712)  Vt Set: 450 mL (03/24/20 0712)  Pressure Support: 5 cmH20 (03/24/20 0825)  PEEP/CPAP: 5 cmH20 (03/24/20 0825)  Oxygen Concentration (%): 50 (03/24/20 1145)  Peak Airway Pressure: 10 cmH2O (03/24/20 0825)  Plateau Pressure: 26 cmH20 (03/23/20 1956)  Total Ve: 3.82 mL (03/24/20 0825)  F/VT Ratio<105 (RSBI): (!) 57.35 (03/24/20 0825)  Lines/Drains/Airways     Central Venous Catheter Line            Percutaneous Central Line Insertion/Assessment - Triple Lumen  03/17/20 1100 right internal jugular 7 days          Drain                 NG/OG Tube 03/17/20  Right mouth 7 days         Urethral Catheter 03/22/20 1700 1 day          Airway                 Airway - Non-Surgical -- days          Arterial Line                 Arterial Line 03/18/20 1344 Right Radial 5 days          Peripheral Intravenous Line                 Peripheral IV - Single Lumen 03/17/20  Anterior;Distal;Right Forearm 7 days         Peripheral IV - Single Lumen 03/22/20 1130 22 G Left Hand 2 days         Peripheral IV - Single Lumen 03/22/20 1236 22 G Left Forearm 2 days              Significant Labs:    CBC/Anemia Profile:  Recent Labs   Lab 03/23/20 0106 03/24/20  0435   WBC 5.37 5.48   HGB 10.2* 10.6*   HCT 34.9* 35.9*    273   MCV 86 85   RDW 13.4 13.6        Chemistries:  Recent Labs   Lab 03/22/20 2034 03/23/20  0106 03/24/20  0435    138 142   K 4.3 4.2 3.1*   CL 98 100 99   CO2 29 27 32*   BUN 32* 28* 19   CREATININE 1.1 0.9 0.7   CALCIUM 8.8 8.6* 8.9   ALBUMIN  --  2.2* 2.1*   PROT  --  6.2 6.1   BILITOT  --  0.4 0.4   ALKPHOS  --  54* 53*   ALT  --  13 14   AST  --  16 21   MG 2.2 2.2 1.6   PHOS  --  4.9* 2.9     Lipase 230 from  2/24    Significant Imaging:  No new imaging to review      ABG  Recent Labs   Lab 03/24/20  0517   PH 7.500*   PO2 76*   PCO2 48.5*   HCO3 37.8*   BE 15     Assessment/Plan:     Pulmonary  * Acute respiratory failure with CoVID19 +  Pt intubated on 3/15 (vent day 7), transferred from [facility] on 3/17 for ARDS 2/2 positive COVID 19 from [facility] labs.   On transfer: , ferritin 368, D-Dimer >33. CRP down trending (353 at admit, 22.4 on 3/24).   Successfully extubated at vent setting (A/C, 450, PEEP 5, FiO2 35%) to BiPAP by respiratory fellow/RTs (with proper protections given high risk aerosolization), now on BiPAP 15/5 at 55% FiO2. Sedation at 1.0 mcg/kg/hr Precedex, off fentanyl      Plan:  - Keep in ICU overnight on BIPAP, slowly wean FiO2 and pressure support  - continue methylprednisolone taper from 40 to 20mg daily begins today  - Off propofol and versed and fentanyl, on 1.0 mcg/kg/hr precedex  - 3% nebulized saline to thin respiratory secretions  - Plaquenil complete, 1st dose of remdesivir at 2000 3/23/, 2nd dose at 1800 today  - Completed 3d of rocephin and azithromycin, off now  - f/u daily CRP, trending down, lymphopenia resolved, H&H stable  - Diuresis with 40 Q6H lasix as patient about 6L positive over admission, to remove volume-based contribution to ARDS   - Metabolic alkalosis today 0517 WITH pH 7.500, PCO2 48.5, PO2 76, BMP HCO3 32. Reasonable etiologies most likely include diuretic use (most likely), Hx of fludrocortisone use (D/C'd yesterday as it conflicted with diuresis requirements), posthypercapnic state. Will watch while patient is on BiPAP, not severe enough (pH > 7.55) to require KCl/NH4-Cl, acetazolamide, or withdrawal of diuresis. Patient still needs to be diuresed given ARDS history, and this takes priority over her acid-base status, so some alkalosis must be tolerated.  - Lipase of 230 yesterday of concern for pancreatitis given patient had TGs 800s last week at 570s few days ago.  However, patient does not have convincing clinical signs (abdominal pain/tenderness, hypotension, fever, nausea/vomiting) for this diagnosis, and given COVID status would like to avoid unnecessary radiology. Will trend Lipase for tomorrow to ensure this is not an accelerating.    Cardiac/Vascular  Essential hypertension  Home med includes lisinopril 20mg daily & lasix 40mg daily.     Plan:  - Wean patient from Cardene.   - Can keep Coreg 25mg BID, Q6h hydralazine 100mg scheduled on 3/21. Added Amlodipine 10 mg qd, Isosorbide dinitrate 20 TID, and Imdur 20 TI  - 40MG IV lasix Q6h (pt 5931L pos since admission), monitoring UOP closely. Metabolic alkalosis differential includes this etiology, but as explained above, patient's ARDS requires minimization of fluid overload    Endocrine  Type 2 diabetes mellitus without complication, without long-term current use of insulin  Pt has A1c 12.4 (3/17/20) & home med is metformin 500mg BID.     Plan:  Required 174U total insulin  - On insulin gtt at 4U/hr  - accuchecks Q4h    - Detemir 30U BID, will transition off gtt once patient's BGL<200. Nursing to call team when BGLs drop below 200 to d/c gtt  - MDSSI  - Watch K for shifts       Critical Care Daily Checklist:    A: Awake: RASS Goal/Actual Goal: RASS Goal: -4-->deep sedation  Actual: Cowan Agitation Sedation Scale (RASS): Alert and calm   B: Spontaneous Breathing Trial Performed? SBT passed today, succesfully extubated to BiPAP 15/5 55% FiO2   C: SAT & SBT Coordinated?  Yes, as above                      D: Delirium: CAM-ICU Overall CAM-ICU: Positive   E: Early Mobility Performed? No   F: Feeding Goal: Goals: Meet % EEN, EPN by RD f/u date  Status: Nutrition Goal Status: progressing towards goal   Current Diet Order   No orders of the defined types were placed in this encounter.      AS: Analgesia/Sedation Off fentanyl/propofol/nimbex, on Precedex 1 mcg/kg/hr   T: Thromboembolic Prophylaxis Enoxaparin 40 SQ BID   H:  HOB > 300 Yes   U: Stress Ulcer Prophylaxis (if needed) D/C'd, patient extubated   G: Glucose Control BG goal 140-180, Off insulin drip, now SQ insulin 40U BD   B: Bowel Function Stool Occurrence: 0   I: Indwelling Catheter (Lines & Saxena) Necessity Peripheral IV x 4, Trialysis R-IJ, A-line, NG/OG tube, Saxena   D: De-escalation of Antimicrobials/Pharmacotherapies Off antibiotics, remdesivir active, plaquenil finished    Plan for the day/ETD Successfully extubated to BiPAP (will watch overnight), wean Cardene, wean sedation, BG goal 140-180 with SQ Insulin 40U BID    Code Status:  Family/Goals of Care: Full Code  Will call family today regarding extubation       Critical secondary to Patient has a condition that poses threat to life and bodily function: Pulmonary Embolism and Severe Respiratory Distress      Critical care was time spent personally by me on the following activities: development of treatment plan with patient or surrogate and bedside caregivers, discussions with consultants, evaluation of patient's response to treatment, examination of patient, ordering and performing treatments and interventions, ordering and review of laboratory studies, ordering and review of radiographic studies, pulse oximetry, re-evaluation of patient's condition. This critical care time did not overlap with that of any other provider or involve time for any procedures.     Sara Justice MD  Critical Care Medicine  [facility]

## 2020-03-24 NOTE — SUBJECTIVE & OBJECTIVE
Interval History: afebrile, no pressors, still on cardene, later in afternoon got extubated    Review of Systems   Unable to perform ROS: Acuity of condition     Objective:     Vital Signs (Most Recent):  Temp: 97.9 °F (36.6 °C) (03/24/20 1100)  Pulse: 95 (03/24/20 1600)  Resp: (!) 23 (03/24/20 1600)  BP: 93/62 (03/24/20 1145)  SpO2: 96 % (03/24/20 1600) Vital Signs (24h Range):  Temp:  [97.6 °F (36.4 °C)-97.9 °F (36.6 °C)] 97.9 °F (36.6 °C)  Pulse:  [] 95  Resp:  [16-37] 23  SpO2:  [89 %-100 %] 96 %  BP: ()/(62-84) 93/62  Arterial Line BP: (119-175)/(5-102) 173/101     Weight: (!) 156.8 kg (345 lb 10.9 oz)  Body mass index is 48.21 kg/m².    Estimated Creatinine Clearance: 200.5 mL/min (based on SCr of 0.7 mg/dL).    Physical Exam   Nursing note and vitals reviewed.  Extubated  Patient not examined because of CoVID19 isolation to limit exposure and use of PPE, discussed with RN      Significant Labs:   CBC:   Recent Labs   Lab 03/23/20  0106 03/24/20  0435   WBC 5.37 5.48   HGB 10.2* 10.6*   HCT 34.9* 35.9*    273     CMP:   Recent Labs   Lab 03/22/20 2034 03/23/20  0106 03/24/20  0435    138 142   K 4.3 4.2 3.1*   CL 98 100 99   CO2 29 27 32*   * 322* 159*   BUN 32* 28* 19   CREATININE 1.1 0.9 0.7   CALCIUM 8.8 8.6* 8.9   PROT  --  6.2 6.1   ALBUMIN  --  2.2* 2.1*   BILITOT  --  0.4 0.4   ALKPHOS  --  54* 53*   AST  --  16 21   ALT  --  13 14   ANIONGAP 10 11 11   EGFRNONAA >60.0 >60.0 >60.0     Microbiology Results (last 7 days)     Procedure Component Value Units Date/Time    Blood culture [[id number]] Collected:  03/19/20 2300    Order Status:  Completed Specimen:  Blood from Peripheral, Hand, Left Updated:  03/24/20 0612     Blood Culture, Routine No Growth to date      No Growth to date      No Growth to date      No Growth to date      No Growth to date    Blood culture [[id number]] Collected:  03/19/20 2300    Order Status:  Completed Specimen:  Blood from Peripheral, Forearm,  Left Updated:  03/24/20 0612     Blood Culture, Routine No Growth to date      No Growth to date      No Growth to date      No Growth to date      No Growth to date    Culture, Respiratory with Gram Stain [[id number]] Collected:  03/20/20 0119    Order Status:  Completed Specimen:  Respiratory from Endotracheal Aspirate Updated:  03/23/20 1035     Respiratory Culture Normal respiratory prasanth      No S aureus or Pseudomonas isolated.     Gram Stain (Respiratory) <10 epithelial cells per low power field.     Gram Stain (Respiratory) Rare WBC's     Gram Stain (Respiratory) No organisms seen    Culture, Respiratory with Gram Stain [[id number]] Collected:  03/19/20 2201    Order Status:  Canceled Specimen:  Respiratory from Tracheal Aspirate     Influenza A & B by Molecular [[id number]] Collected:  03/17/20 1613    Order Status:  Completed Specimen:  Nasopharyngeal Swab Updated:  03/17/20 1654     Influenza A, Molecular Negative     Influenza B, Molecular Negative     Flu A & B Source np        All pertinent labs within the past 24 hours have been reviewed.    Significant Imaging: I have reviewed all pertinent imaging results/findings within the past 24 hours.

## 2020-03-24 NOTE — RESPIRATORY THERAPY
Patient extubated with RN and MD at bedside. Patient extubated to BiPAP per Dr. Bello's orders.  Will continue to monitor.       03/24/20 1145   Patient Assessment/Suction   Level of Consciousness (AVPU) alert   PRE-TX-O2   O2 Device (Oxygen Therapy) BiPAP   $ Is the patient on Low Flow Oxygen? Yes   Oxygen Concentration (%) 50   SpO2 (!) 89 %   Pulse Oximetry Type Continuous   $ Pulse Oximetry - Multiple Charge Pulse Oximetry - Multiple   Pulse 86   Resp (!) 25   BP 93/62   ETCO2   ETCO2 (mmHg) 1 mmHg   Vital Capacity   $ Vital Capacity Charges Given   Vital Capacity (mL) 578   Patient Position (VC) HOB elevated   Effort (VC) good   Ready to Wean/Extubation Screen   FIO2<=50 (chart decimal) 0.5   Preset CPAP/BiPAP Settings   Mode Of Delivery BiPAP   $ CPAP/BiPAP Daily Charge BiPAP/CPAP Daily   $ Initial CPAP/BiPAP Setup? Yes   $ Is patient using? Yes   Size of Mask Large   Sized Appropriately? Yes   Equipment Type V60   Airway Device Type large full face mask   Ipap 15   EPAP (cm H2O) 8   Pressure Support (cm H2O) 7   Set Rate (Breaths/Min) 16   ITime (sec) 1   Rise Time (sec) 3   Patient CPAP/BiPAP Settings   RR Total (Breaths/Min) 26   Tidal Volume (mL) 554   VE Minute Ventilation (L/min) 13.9 L/min   Peak Inspiratory Pressure (cm H2O) 15   TiTOT (%) 32   Total Leak (L/Min) 1   Patient Trigger - ST Mode Only (%) 68   CPAP/BiPAP Alarms   High Pressure (cm H2O) 45   Low Pressure (cm H2O) 5   Low Pressure Delay (Sec) 20   Minute Ventilation (L/Min) 2   High RR (breaths/min) 45   Low RR (breaths/min) 5

## 2020-03-24 NOTE — ASSESSMENT & PLAN NOTE
Pt has A1c 12.4 (3/17/20) & home med is metformin 500mg BID.     Plan:  Required 174U total insulin  - On insulin gtt at 4U/hr; consider transitioning to subq insulin  - accuchecks Q4h    - Detemir 30U BID, will transition off gtt once patient's BGL<200. Nursing to call team when BGLs drop below 200 to d/c gtt  - MDSSI  - Watch K for shifts

## 2020-03-24 NOTE — PROGRESS NOTES
[facility]  Infectious Disease  Progress Note    Patient Name: Riri Gonsalves  MRN: [id number]  Admission Date: 3/17/2020  Length of Stay: 7 days  Attending Physician: Ankush Shaikh*  Primary Care Provider: Jocelynn Brooks MD    Isolation Status: Airborne and Contact and Droplet  Assessment/Plan:      * Acute respiratory failure with CoVID19 +  Pt is 29yoF w/ PMH DM2, HTN, and benign intracranial HTN transferred to [facility] on 3/17 for possible ECMOAs per chart review and info from OSH pt had rhinorrhea & dry cough for several days- went to urgent care- was discharged home on amoxicillin. A few days later she was continuing to have fevers as high as 103 & was having worsening SOB at rest- went to [facility]  3/13 with SOB, fevers, and cough.   Was on 2L oxygen. Labs on presentation were CRP 18.2, WBC 3.7, flu negative. Pt was admitted to medicine & started on empiric CAP coverage. Pt's condition continued to deteriorate- worsening leukopenia, lymphopenia, elevated LDH, & CRP, & increasing oxygen requirements. CXR showed worsening multifocal airspace disease. Pt in ARDS. Pt was transferred to MICU on 3/14. Pt decompensated & was intubated on 3/15 & on 3/16 results came back positive for Covid 19. Echo showed normal LV function (EF 60%). Pt was also started on methylprednisolone. 3/17 transferred to [facility]. ID consulted for COVID 19 management. Extubated 3/24  Hydroxychloroquine started 3/18- course completed 3/22, off Azithro, CFTX for 3 days    Recommendations  -- Continue COVID 19 isolation protocol, limit visitors  -- Continue 10 day course of Remdesivir. 200mg IV 3/23 and then 100mg IV daily for 9 days  -- Follow AST/ALT and creatinine closely      Case discussed with Dr Egan    Thank you for your consult. I will follow-up with patient. Please contact us if you have any additional questions.    Floyd iWtt MD   ID Fellow  Infectious Disease  [facility]    Subjective:     Principal Problem:Acute respiratory failure    HPI: History obtained from review of chart.  Pt is 28 y/o female w/ PMH DM2, HTN, and benign intracranial HTN who presented to [facility] on 3/13 complaining of shortness of breath, fevers, and cough X several days.  Prior to her presentation, she had been treated with amoxicillin with limited benefit.    She was admitted at [facility]. Pt's condition continued to deteriorate- worsening leukopenia, lymphopenia, elevated LDH, & CRP, & increasing oxygen requirements. CXR showed worsening multifocal airspace disease. Pt was transferred to MICU on 3/14. Pt decompensated &was intubated on 3/15 & on 3/16 results came back positive for Covid 19. Pt presents as transfer from [facility] ICU for possible ecmo.  Interval History: afebrile, no pressors, still on cardene, later in afternoon got extubated    Review of Systems   Unable to perform ROS: Acuity of condition     Objective:     Vital Signs (Most Recent):  Temp: 97.9 °F (36.6 °C) (03/24/20 1100)  Pulse: 95 (03/24/20 1600)  Resp: (!) 23 (03/24/20 1600)  BP: 93/62 (03/24/20 1145)  SpO2: 96 % (03/24/20 1600) Vital Signs (24h Range):  Temp:  [97.6 °F (36.4 °C)-97.9 °F (36.6 °C)] 97.9 °F (36.6 °C)  Pulse:  [] 95  Resp:  [16-37] 23  SpO2:  [89 %-100 %] 96 %  BP: ()/(62-84) 93/62  Arterial Line BP: (119-175)/(5-102) 173/101     Weight: (!) 156.8 kg (345 lb 10.9 oz)  Body mass index is 48.21 kg/m².    Estimated Creatinine Clearance: 200.5 mL/min (based on SCr of 0.7 mg/dL).    Physical Exam   Nursing note and vitals reviewed.  Extubated  Patient not examined because of CoVID19 isolation to limit exposure and use of PPE, discussed with RN      Significant Labs:   CBC:   Recent Labs   Lab 03/23/20  0106 03/24/20  0435   WBC 5.37 5.48   HGB 10.2* 10.6*   HCT 34.9* 35.9*    273     CMP:   Recent Labs   Lab 03/22/20 2034 03/23/20 0106 03/24/20  0435    138 142   K 4.3 4.2 3.1*   CL 98 100  99   CO2 29 27 32*   * 322* 159*   BUN 32* 28* 19   CREATININE 1.1 0.9 0.7   CALCIUM 8.8 8.6* 8.9   PROT  --  6.2 6.1   ALBUMIN  --  2.2* 2.1*   BILITOT  --  0.4 0.4   ALKPHOS  --  54* 53*   AST  --  16 21   ALT  --  13 14   ANIONGAP 10 11 11   EGFRNONAA >60.0 >60.0 >60.0     Microbiology Results (last 7 days)     Procedure Component Value Units Date/Time    Blood culture [[id number]] Collected:  03/19/20 2300    Order Status:  Completed Specimen:  Blood from Peripheral, Hand, Left Updated:  03/24/20 0612     Blood Culture, Routine No Growth to date      No Growth to date      No Growth to date      No Growth to date      No Growth to date    Blood culture [[id number]] Collected:  03/19/20 2300    Order Status:  Completed Specimen:  Blood from Peripheral, Forearm, Left Updated:  03/24/20 0612     Blood Culture, Routine No Growth to date      No Growth to date      No Growth to date      No Growth to date      No Growth to date    Culture, Respiratory with Gram Stain [[id number]] Collected:  03/20/20 0119    Order Status:  Completed Specimen:  Respiratory from Endotracheal Aspirate Updated:  03/23/20 1035     Respiratory Culture Normal respiratory prasanth      No S aureus or Pseudomonas isolated.     Gram Stain (Respiratory) <10 epithelial cells per low power field.     Gram Stain (Respiratory) Rare WBC's     Gram Stain (Respiratory) No organisms seen    Culture, Respiratory with Gram Stain [[id number]] Collected:  03/19/20 2201    Order Status:  Canceled Specimen:  Respiratory from Tracheal Aspirate     Influenza A & B by Molecular [[id number]] Collected:  03/17/20 1613    Order Status:  Completed Specimen:  Nasopharyngeal Swab Updated:  03/17/20 1654     Influenza A, Molecular Negative     Influenza B, Molecular Negative     Flu A & B Source np        All pertinent labs within the past 24 hours have been reviewed.    Significant Imaging: I have reviewed all pertinent imaging results/findings within the past 24  hours.

## 2020-03-24 NOTE — ASSESSMENT & PLAN NOTE
- Lipase of 230 yesterday of concern for pancreatitis given patient had TGs 800s last week at 570s few days ago. However, patient does not have convincing clinical signs (abdominal pain/tenderness, hypotension, fever, nausea/vomiting) for this diagnosis, and given COVID status would like to avoid unnecessary radiology.   - Will trend Lipase for tomorrow to ensure resolution. If lipase continues to rise or enters 1000s range, will consider CT A/P to evaluate for pancreatitis, but this will require further pain management/NPO status/fluid resuscitation that will complicate management of her COVID-ARDS.

## 2020-03-24 NOTE — ASSESSMENT & PLAN NOTE
Home med includes lisinopril 20mg daily & lasix 40mg daily.     Plan:  - Wean patient from Cardene.   - Can keep Coreg 25mg BID, Q6h hydralazine 100mg scheduled on 3/21. Added Amlodipine 10 mg qd, Isosorbide dinitrate 20 TID, and Imdur 20 TI  - 40MG IV lasix Q6h (pt 5931L pos since admission), monitoring UOP closely. Metabolic alkalosis differential includes this etiology, but as explained above, patient's ARDS requires minimization of fluid overload

## 2020-03-24 NOTE — ASSESSMENT & PLAN NOTE
Pt intubated on 3/15 (vent day 7), transferred from [facility] on 3/17 for ARDS 2/2 positive COVID 19 from [facility] labs.   On transfer: , ferritin 368, D-Dimer >33. CRP down trending (353 at admit, 22.4 on 3/24).   Successfully extubated at vent setting (A/C, 450, PEEP 5, FiO2 35%) to BiPAP by respiratory fellow/RTs (with proper protections given high risk aerosolization), now on BiPAP 15/5 at 55% FiO2. Sedation at 1.0 mcg/kg/hr Precedex, off fentanyl      Plan:  - Keep in ICU overnight on BIPAP, slowly wean FiO2 and pressure support  - continue methylprednisolone taper from 40 to 20mg daily begins today  - 3% nebulized saline to thin respiratory secretions  - Plaquenil complete, 1st dose of remdesivir at 2000 3/23/, 2nd dose at 1800 today  - Completed 3d of rocephin and azithromycin, off now  - f/u daily CRP, trending down, lymphopenia resolved, H&H stable  - Diuresis with 40 Q6H lasix as patient about 6L positive over admission, to remove volume-based contribution to ARDS   - Metabolic alkalosis today 0517 WITH pH 7.500, PCO2 48.5, PO2 76, BMP HCO3 32. Reasonable etiologies most likely include diuretic use (most likely), Hx of fludrocortisone use (D/C'd yesterday as it conflicted with diuresis requirements), posthypercapnic state. Will watch while patient is on BiPAP, not severe enough (pH > 7.55) to require KCl/NH4-Cl, acetazolamide, or withdrawal of diuresis. Patient still needs to be diuresed given ARDS history, and this takes priority over her acid-base status, so some alkalosis must be tolerated.  - 3/23 patient was started on remdezivir; daily labs to monitor include CBC, CMP, PT/INR, aPTT, UA and have been ordered   - 3/24- Patient extubated to BiPAP 55% Fio2, 15/5 and tolerating well

## 2020-03-24 NOTE — SUBJECTIVE & OBJECTIVE
Interval History: intubated and sedated, on cardene drip high BP, 40% and 10 PEEP    Review of Systems   Unable to perform ROS: Intubated     Objective:     Vital Signs (Most Recent):  Temp: 97 °F (36.1 °C) (03/23/20 1600)  Pulse: 89 (03/23/20 1800)  Resp: 18 (03/23/20 1800)  BP: (!) 150/84 (03/23/20 1700)  SpO2: (!) 94 % (03/23/20 1800) Vital Signs (24h Range):  Temp:  [97 °F (36.1 °C)-97.2 °F (36.2 °C)] 97 °F (36.1 °C)  Pulse:  [] 89  Resp:  [18-21] 18  SpO2:  [93 %-99 %] 94 %  BP: (109-206)/(56-94) 150/84  Arterial Line BP: (119-198)/() 144/69     Weight: (!) 156.8 kg (345 lb 10.9 oz)  Body mass index is 48.21 kg/m².    Estimated Creatinine Clearance: 155.9 mL/min (based on SCr of 0.9 mg/dL).    Physical Exam   Nursing note and vitals reviewed.  Patient not examined because of CoVID19 isolation to limit exposure and use of PPE, discussed with RN      Significant Labs:   CBC:   Recent Labs   Lab 03/22/20  0300 03/23/20  0106   WBC 5.14 5.37   HGB 11.1* 10.2*   HCT 37.5 34.9*    285     CMP:   Recent Labs   Lab 03/22/20  0300 03/22/20 2034 03/23/20  0106    137 138   K 4.1 4.3 4.2   CL 98 98 100   CO2 28 29 27   * 376* 322*   BUN 22* 32* 28*   CREATININE 0.8 1.1 0.9   CALCIUM 8.7 8.8 8.6*   PROT 6.7  --  6.2   ALBUMIN 2.4*  --  2.2*   BILITOT 0.4  --  0.4   ALKPHOS 54*  --  54*   AST 15  --  16   ALT 14  --  13   ANIONGAP 13 10 11   EGFRNONAA >60.0 >60.0 >60.0     Microbiology Results (last 7 days)     Procedure Component Value Units Date/Time    Culture, Respiratory with Gram Stain [[id number]] Collected:  03/20/20 0119    Order Status:  Completed Specimen:  Respiratory from Endotracheal Aspirate Updated:  03/23/20 1035     Respiratory Culture Normal respiratory prasanth      No S aureus or Pseudomonas isolated.     Gram Stain (Respiratory) <10 epithelial cells per low power field.     Gram Stain (Respiratory) Rare WBC's     Gram Stain (Respiratory) No organisms seen    Blood culture  [[id number]] Collected:  03/19/20 2300    Order Status:  Completed Specimen:  Blood from Peripheral, Hand, Left Updated:  03/23/20 0612     Blood Culture, Routine No Growth to date      No Growth to date      No Growth to date      No Growth to date    Blood culture [[id number]] Collected:  03/19/20 2300    Order Status:  Completed Specimen:  Blood from Peripheral, Forearm, Left Updated:  03/23/20 0612     Blood Culture, Routine No Growth to date      No Growth to date      No Growth to date      No Growth to date    Culture, Respiratory with Gram Stain [[id number]] Collected:  03/19/20 2201    Order Status:  Canceled Specimen:  Respiratory from Tracheal Aspirate     Influenza A & B by Molecular [[id number]] Collected:  03/17/20 1613    Order Status:  Completed Specimen:  Nasopharyngeal Swab Updated:  03/17/20 1654     Influenza A, Molecular Negative     Influenza B, Molecular Negative     Flu A & B Source np        All pertinent labs within the past 24 hours have been reviewed.    Significant Imaging: I have reviewed all pertinent imaging results/findings within the past 24 hours.

## 2020-03-24 NOTE — SUBJECTIVE & OBJECTIVE
Interval History/Significant Events: No acute events overnight. Receiving 2nd dose of remdesivir at 1800 today. Insulin drip continued with KCl replenishment. Lipase of 230 from yesterday ordered in light of hypertriglyceridemia (highest value 895 at 3/19, 574 at 3/23) 2/2 propofol use (off propofol now) out of concern for pancreatitis, but staff notes patient not grimacing with abdominal palpation, and shaking head when asked if in pain.     ABG this AM showing metabolic alkalosis pH 7.50, PCO2 48.5, PO2 76, BMP HCO3 32, with some respiratory compensation, P/F ratio 217 (at FiO2 35%).  CRP still downtrending from admit 353.4 --> 22.5 today. Lymph% now 25%, with normal WBC/PLT, stable H&H.     Patient extubated to BiPAP 55% Fio2, 15/5 and tolerating well.     Review of Systems   Unable to perform ROS: Intubated     Objective:     Vital Signs (Most Recent):  Temp: 97.6 °F (36.4 °C) (03/23/20 2000)  Pulse: 86 (03/24/20 1145)  Resp: (!) 25 (03/24/20 1145)  BP: 93/62 (03/24/20 1145)  SpO2: (!) 89 % (03/24/20 1145) Vital Signs (24h Range):  Temp:  [97 °F (36.1 °C)-97.6 °F (36.4 °C)] 97.6 °F (36.4 °C)  Pulse:  [] 86  Resp:  [16-25] 25  SpO2:  [89 %-99 %] 89 %  BP: ()/(56-93) 93/62  Arterial Line BP: (121-175)/() 152/86   Weight: (!) 156.8 kg (345 lb 10.9 oz)  Body mass index is 48.21 kg/m².      Intake/Output Summary (Last 24 hours) at 3/24/2020 1254  Last data filed at 3/24/2020 1100  Gross per 24 hour   Intake 3060.24 ml   Output 5125 ml   Net -2064.76 ml       Physical Exam   Please refer to attending/fellow attestation (COVID POS status mindful of limiting entry into patient's room)    Vents:  Vent Mode: Spont (03/24/20 0825)  Ventilator Initiated: Yes (03/17/20 1619)  Set Rate: 18 BPM (03/24/20 0712)  Vt Set: 450 mL (03/24/20 0712)  Pressure Support: 5 cmH20 (03/24/20 0825)  PEEP/CPAP: 5 cmH20 (03/24/20 0825)  Oxygen Concentration (%): 50 (03/24/20 1145)  Peak Airway Pressure: 10 cmH2O (03/24/20  0825)  Plateau Pressure: 26 cmH20 (03/23/20 1956)  Total Ve: 3.82 mL (03/24/20 0825)  F/VT Ratio<105 (RSBI): (!) 57.35 (03/24/20 0825)  Lines/Drains/Airways     Central Venous Catheter Line            Percutaneous Central Line Insertion/Assessment - Triple Lumen  03/17/20 1100 right internal jugular 7 days          Drain                 NG/OG Tube 03/17/20  Right mouth 7 days         Urethral Catheter 03/22/20 1700 1 day          Airway                 Airway - Non-Surgical -- days          Arterial Line                 Arterial Line 03/18/20 1344 Right Radial 5 days          Peripheral Intravenous Line                 Peripheral IV - Single Lumen 03/17/20  Anterior;Distal;Right Forearm 7 days         Peripheral IV - Single Lumen 03/22/20 1130 22 G Left Hand 2 days         Peripheral IV - Single Lumen 03/22/20 1236 22 G Left Forearm 2 days              Significant Labs:    CBC/Anemia Profile:  Recent Labs   Lab 03/23/20  0106 03/24/20  0435   WBC 5.37 5.48   HGB 10.2* 10.6*   HCT 34.9* 35.9*    273   MCV 86 85   RDW 13.4 13.6        Chemistries:  Recent Labs   Lab 03/22/20 2034 03/23/20  0106 03/24/20  0435    138 142   K 4.3 4.2 3.1*   CL 98 100 99   CO2 29 27 32*   BUN 32* 28* 19   CREATININE 1.1 0.9 0.7   CALCIUM 8.8 8.6* 8.9   ALBUMIN  --  2.2* 2.1*   PROT  --  6.2 6.1   BILITOT  --  0.4 0.4   ALKPHOS  --  54* 53*   ALT  --  13 14   AST  --  16 21   MG 2.2 2.2 1.6   PHOS  --  4.9* 2.9     Lipase 230 from 2/24    Significant Imaging:  No new imaging to review

## 2020-03-24 NOTE — ASSESSMENT & PLAN NOTE
Pt is 29yoF w/ PMH DM2, HTN, and benign intracranial HTN transferred to [facility] on 3/17 for possible ECMOAs per chart review and info from OSH pt had rhinorrhea & dry cough for several days- went to urgent care- was discharged home on amoxicillin. A few days later she was continuing to have fevers as high as 103 & was having worsening SOB at rest- went to [facility]  3/13 with SOB, fevers, and cough.   Was on 2L oxygen. Labs on presentation were CRP 18.2, WBC 3.7, flu negative. Pt was admitted to medicine & started on empiric CAP coverage. Pt's condition continued to deteriorate- worsening leukopenia, lymphopenia, elevated LDH, & CRP, & increasing oxygen requirements. CXR showed worsening multifocal airspace disease. Pt in ARDS. Pt was transferred to MICU on 3/14. Pt decompensated & was intubated on 3/15 & on 3/16 results came back positive for Covid 19. Echo showed normal LV function (EF 60%). Pt was also started on methylprednisolone. 3/17 transferred to [facility]. ID consulted for COVID 19 management. Extubated 3/24  Hydroxychloroquine started 3/18- course completed 3/22, off Azithro, CFTX for 3 days    Recommendations  -- Continue COVID 19 isolation protocol, limit visitors  -- Continue 10 day course of Remdesivir. 200mg IV 3/23 and then 100mg IV daily for 9 days

## 2020-03-25 PROBLEM — E87.5 HYPERKALEMIA: Status: ACTIVE | Noted: 2020-03-25

## 2020-03-25 PROBLEM — E87.6 HYPOKALEMIA: Status: ACTIVE | Noted: 2020-03-25

## 2020-03-25 LAB
ALBUMIN SERPL BCP-MCNC: 2.7 G/DL (ref 3.5–5.2)
ALP SERPL-CCNC: 63 U/L (ref 55–135)
ALT SERPL W/O P-5'-P-CCNC: 31 U/L (ref 10–44)
ANION GAP SERPL CALC-SCNC: 13 MMOL/L (ref 8–16)
ANION GAP SERPL CALC-SCNC: 9 MMOL/L (ref 8–16)
APTT BLDCRRT: 25.1 SEC (ref 21–32)
AST SERPL-CCNC: 34 U/L (ref 10–40)
BACTERIA #/AREA URNS AUTO: ABNORMAL /HPF
BACTERIA BLD CULT: NORMAL
BACTERIA BLD CULT: NORMAL
BASOPHILS # BLD AUTO: 0.02 K/UL (ref 0–0.2)
BASOPHILS NFR BLD: 0.2 % (ref 0–1.9)
BILIRUB SERPL-MCNC: 0.7 MG/DL (ref 0.1–1)
BILIRUB UR QL STRIP: NEGATIVE
BUN SERPL-MCNC: 20 MG/DL (ref 6–20)
BUN SERPL-MCNC: 20 MG/DL (ref 6–20)
CALCIUM SERPL-MCNC: 8.6 MG/DL (ref 8.7–10.5)
CALCIUM SERPL-MCNC: 9.2 MG/DL (ref 8.7–10.5)
CHLORIDE SERPL-SCNC: 96 MMOL/L (ref 95–110)
CHLORIDE SERPL-SCNC: 98 MMOL/L (ref 95–110)
CLARITY UR REFRACT.AUTO: ABNORMAL
CO2 SERPL-SCNC: 33 MMOL/L (ref 23–29)
CO2 SERPL-SCNC: 35 MMOL/L (ref 23–29)
COLOR UR AUTO: YELLOW
CREAT SERPL-MCNC: 0.7 MG/DL (ref 0.5–1.4)
CREAT SERPL-MCNC: 0.7 MG/DL (ref 0.5–1.4)
CRP SERPL-MCNC: 55.6 MG/L (ref 0–8.2)
DIFFERENTIAL METHOD: ABNORMAL
EOSINOPHIL # BLD AUTO: 0.1 K/UL (ref 0–0.5)
EOSINOPHIL NFR BLD: 0.6 % (ref 0–8)
ERYTHROCYTE [DISTWIDTH] IN BLOOD BY AUTOMATED COUNT: 13.9 % (ref 11.5–14.5)
EST. GFR  (AFRICAN AMERICAN): >60 ML/MIN/1.73 M^2
EST. GFR  (AFRICAN AMERICAN): >60 ML/MIN/1.73 M^2
EST. GFR  (NON AFRICAN AMERICAN): >60 ML/MIN/1.73 M^2
EST. GFR  (NON AFRICAN AMERICAN): >60 ML/MIN/1.73 M^2
GLUCOSE SERPL-MCNC: 166 MG/DL (ref 70–110)
GLUCOSE SERPL-MCNC: 205 MG/DL (ref 70–110)
GLUCOSE UR QL STRIP: NEGATIVE
HCT VFR BLD AUTO: 38.8 % (ref 37–48.5)
HGB BLD-MCNC: 11.3 G/DL (ref 12–16)
HGB UR QL STRIP: ABNORMAL
HYALINE CASTS UR QL AUTO: 1 /LPF
IMM GRANULOCYTES # BLD AUTO: 0.1 K/UL (ref 0–0.04)
IMM GRANULOCYTES NFR BLD AUTO: 0.9 % (ref 0–0.5)
INR PPP: 1.1 (ref 0.8–1.2)
KETONES UR QL STRIP: ABNORMAL
LACTATE SERPL-SCNC: 1.2 MMOL/L (ref 0.5–2.2)
LEUKOCYTE ESTERASE UR QL STRIP: ABNORMAL
LIPASE SERPL-CCNC: 359 U/L (ref 4–60)
LYMPHOCYTES # BLD AUTO: 1.8 K/UL (ref 1–4.8)
LYMPHOCYTES NFR BLD: 16.7 % (ref 18–48)
MAGNESIUM SERPL-MCNC: 1.7 MG/DL (ref 1.6–2.6)
MCH RBC QN AUTO: 25.1 PG (ref 27–31)
MCHC RBC AUTO-ENTMCNC: 29.1 G/DL (ref 32–36)
MCV RBC AUTO: 86 FL (ref 82–98)
MICROSCOPIC COMMENT: ABNORMAL
MONOCYTES # BLD AUTO: 0.7 K/UL (ref 0.3–1)
MONOCYTES NFR BLD: 6.4 % (ref 4–15)
NEUTROPHILS # BLD AUTO: 8.1 K/UL (ref 1.8–7.7)
NEUTROPHILS NFR BLD: 75.2 % (ref 38–73)
NITRITE UR QL STRIP: NEGATIVE
NRBC BLD-RTO: 0 /100 WBC
PH UR STRIP: 7 [PH] (ref 5–8)
PHOSPHATE SERPL-MCNC: 3.8 MG/DL (ref 2.7–4.5)
PLATELET # BLD AUTO: 303 K/UL (ref 150–350)
PMV BLD AUTO: 11.5 FL (ref 9.2–12.9)
POCT GLUCOSE: 173 MG/DL (ref 70–110)
POCT GLUCOSE: 208 MG/DL (ref 70–110)
POCT GLUCOSE: 215 MG/DL (ref 70–110)
POCT GLUCOSE: 223 MG/DL (ref 70–110)
POTASSIUM SERPL-SCNC: 2.8 MMOL/L (ref 3.5–5.1)
POTASSIUM SERPL-SCNC: 3.8 MMOL/L (ref 3.5–5.1)
PROT SERPL-MCNC: 6.7 G/DL (ref 6–8.4)
PROT UR QL STRIP: ABNORMAL
PROTHROMBIN TIME: 11.6 SEC (ref 9–12.5)
RBC # BLD AUTO: 4.5 M/UL (ref 4–5.4)
RBC #/AREA URNS AUTO: >100 /HPF (ref 0–4)
SODIUM SERPL-SCNC: 140 MMOL/L (ref 136–145)
SODIUM SERPL-SCNC: 144 MMOL/L (ref 136–145)
SP GR UR STRIP: 1.02 (ref 1–1.03)
SQUAMOUS #/AREA URNS AUTO: 1 /HPF
URN SPEC COLLECT METH UR: ABNORMAL
WBC # BLD AUTO: 10.77 K/UL (ref 3.9–12.7)
WBC #/AREA URNS AUTO: 3 /HPF (ref 0–5)

## 2020-03-25 PROCEDURE — 99233 SBSQ HOSP IP/OBS HIGH 50: CPT | Mod: ,,, | Performed by: INTERNAL MEDICINE

## 2020-03-25 PROCEDURE — 63600175 PHARM REV CODE 636 W HCPCS: Performed by: STUDENT IN AN ORGANIZED HEALTH CARE EDUCATION/TRAINING PROGRAM

## 2020-03-25 PROCEDURE — 80048 BASIC METABOLIC PNL TOTAL CA: CPT

## 2020-03-25 PROCEDURE — 99233 PR SUBSEQUENT HOSPITAL CARE,LEVL III: ICD-10-PCS | Mod: ,,, | Performed by: INTERNAL MEDICINE

## 2020-03-25 PROCEDURE — 81001 URINALYSIS AUTO W/SCOPE: CPT

## 2020-03-25 PROCEDURE — 86140 C-REACTIVE PROTEIN: CPT

## 2020-03-25 PROCEDURE — 85025 COMPLETE CBC W/AUTO DIFF WBC: CPT

## 2020-03-25 PROCEDURE — 25000003 PHARM REV CODE 250: Performed by: INTERNAL MEDICINE

## 2020-03-25 PROCEDURE — 25000003 PHARM REV CODE 250: Performed by: STUDENT IN AN ORGANIZED HEALTH CARE EDUCATION/TRAINING PROGRAM

## 2020-03-25 PROCEDURE — 63600175 PHARM REV CODE 636 W HCPCS: Performed by: INTERNAL MEDICINE

## 2020-03-25 PROCEDURE — 63600175 PHARM REV CODE 636 W HCPCS: Performed by: NURSE PRACTITIONER

## 2020-03-25 PROCEDURE — 20000000 HC ICU ROOM

## 2020-03-25 PROCEDURE — 80053 COMPREHEN METABOLIC PANEL: CPT

## 2020-03-25 PROCEDURE — 94761 N-INVAS EAR/PLS OXIMETRY MLT: CPT

## 2020-03-25 PROCEDURE — 85610 PROTHROMBIN TIME: CPT

## 2020-03-25 PROCEDURE — 83735 ASSAY OF MAGNESIUM: CPT

## 2020-03-25 PROCEDURE — 85730 THROMBOPLASTIN TIME PARTIAL: CPT

## 2020-03-25 PROCEDURE — 94799 UNLISTED PULMONARY SVC/PX: CPT

## 2020-03-25 PROCEDURE — 83605 ASSAY OF LACTIC ACID: CPT

## 2020-03-25 PROCEDURE — 99900035 HC TECH TIME PER 15 MIN (STAT)

## 2020-03-25 PROCEDURE — 84100 ASSAY OF PHOSPHORUS: CPT

## 2020-03-25 PROCEDURE — 94660 CPAP INITIATION&MGMT: CPT

## 2020-03-25 PROCEDURE — 27000221 HC OXYGEN, UP TO 24 HOURS

## 2020-03-25 PROCEDURE — 83690 ASSAY OF LIPASE: CPT

## 2020-03-25 RX ORDER — POTASSIUM CHLORIDE 7.45 MG/ML
10 INJECTION INTRAVENOUS
Status: COMPLETED | OUTPATIENT
Start: 2020-03-25 | End: 2020-03-25

## 2020-03-25 RX ORDER — HYDRALAZINE HYDROCHLORIDE 20 MG/ML
20 INJECTION INTRAMUSCULAR; INTRAVENOUS ONCE
Status: COMPLETED | OUTPATIENT
Start: 2020-03-25 | End: 2020-03-25

## 2020-03-25 RX ORDER — POTASSIUM CHLORIDE 14.9 MG/ML
20 INJECTION INTRAVENOUS ONCE
Status: DISCONTINUED | OUTPATIENT
Start: 2020-03-25 | End: 2020-03-25

## 2020-03-25 RX ORDER — FUROSEMIDE 10 MG/ML
40 INJECTION INTRAMUSCULAR; INTRAVENOUS
Status: DISCONTINUED | OUTPATIENT
Start: 2020-03-25 | End: 2020-03-26

## 2020-03-25 RX ORDER — INSULIN ASPART 100 [IU]/ML
1-10 INJECTION, SOLUTION INTRAVENOUS; SUBCUTANEOUS
Status: DISCONTINUED | OUTPATIENT
Start: 2020-03-25 | End: 2020-03-30 | Stop reason: HOSPADM

## 2020-03-25 RX ORDER — MIDAZOLAM HYDROCHLORIDE 5 MG/ML
2 INJECTION INTRAMUSCULAR; INTRAVENOUS
Status: DISCONTINUED | OUTPATIENT
Start: 2020-03-25 | End: 2020-03-25

## 2020-03-25 RX ORDER — LORAZEPAM 2 MG/ML
INJECTION INTRAMUSCULAR
Status: COMPLETED
Start: 2020-03-25 | End: 2020-03-25

## 2020-03-25 RX ORDER — IBUPROFEN 200 MG
16 TABLET ORAL
Status: DISCONTINUED | OUTPATIENT
Start: 2020-03-25 | End: 2020-03-30 | Stop reason: HOSPADM

## 2020-03-25 RX ORDER — MAGNESIUM SULFATE HEPTAHYDRATE 40 MG/ML
2 INJECTION, SOLUTION INTRAVENOUS ONCE
Status: COMPLETED | OUTPATIENT
Start: 2020-03-25 | End: 2020-03-25

## 2020-03-25 RX ORDER — POTASSIUM CHLORIDE 29.8 MG/ML
40 INJECTION INTRAVENOUS ONCE
Status: COMPLETED | OUTPATIENT
Start: 2020-03-25 | End: 2020-03-25

## 2020-03-25 RX ORDER — IBUPROFEN 200 MG
24 TABLET ORAL
Status: DISCONTINUED | OUTPATIENT
Start: 2020-03-25 | End: 2020-03-30 | Stop reason: HOSPADM

## 2020-03-25 RX ORDER — INSULIN ASPART 100 [IU]/ML
5 INJECTION, SOLUTION INTRAVENOUS; SUBCUTANEOUS
Status: DISCONTINUED | OUTPATIENT
Start: 2020-03-25 | End: 2020-03-30 | Stop reason: HOSPADM

## 2020-03-25 RX ORDER — GLUCAGON 1 MG
1 KIT INJECTION
Status: DISCONTINUED | OUTPATIENT
Start: 2020-03-25 | End: 2020-03-30 | Stop reason: HOSPADM

## 2020-03-25 RX ORDER — POTASSIUM CHLORIDE 20 MEQ/15ML
40 SOLUTION ORAL ONCE
Status: COMPLETED | OUTPATIENT
Start: 2020-03-25 | End: 2020-03-25

## 2020-03-25 RX ADMIN — INSULIN ASPART 2 UNITS: 100 INJECTION, SOLUTION INTRAVENOUS; SUBCUTANEOUS at 12:03

## 2020-03-25 RX ADMIN — FUROSEMIDE 40 MG: 10 INJECTION, SOLUTION INTRAVENOUS at 09:03

## 2020-03-25 RX ADMIN — INSULIN ASPART 4 UNITS: 100 INJECTION, SOLUTION INTRAVENOUS; SUBCUTANEOUS at 05:03

## 2020-03-25 RX ADMIN — HYDRALAZINE HYDROCHLORIDE 10 MG: 20 INJECTION INTRAMUSCULAR; INTRAVENOUS at 05:03

## 2020-03-25 RX ADMIN — INSULIN ASPART 5 UNITS: 100 INJECTION, SOLUTION INTRAVENOUS; SUBCUTANEOUS at 05:03

## 2020-03-25 RX ADMIN — HYDRALAZINE HYDROCHLORIDE 100 MG: 50 TABLET ORAL at 09:03

## 2020-03-25 RX ADMIN — AMLODIPINE BESYLATE 10 MG: 10 TABLET ORAL at 11:03

## 2020-03-25 RX ADMIN — HYDRALAZINE HYDROCHLORIDE 10 MG: 20 INJECTION INTRAMUSCULAR; INTRAVENOUS at 10:03

## 2020-03-25 RX ADMIN — FUROSEMIDE 80 MG: 10 INJECTION, SOLUTION INTRAVENOUS at 10:03

## 2020-03-25 RX ADMIN — ISOSORBIDE DINITRATE 20 MG: 10 TABLET ORAL at 02:03

## 2020-03-25 RX ADMIN — POTASSIUM CHLORIDE 10 MEQ: 7.46 INJECTION, SOLUTION INTRAVENOUS at 02:03

## 2020-03-25 RX ADMIN — DEXMEDETOMIDINE HYDROCHLORIDE 1.4 MCG/KG/HR: 100 INJECTION, SOLUTION, CONCENTRATE INTRAVENOUS at 06:03

## 2020-03-25 RX ADMIN — CARVEDILOL 25 MG: 25 TABLET, FILM COATED ORAL at 11:03

## 2020-03-25 RX ADMIN — POTASSIUM CHLORIDE 40 MEQ: 20 SOLUTION ORAL at 04:03

## 2020-03-25 RX ADMIN — POTASSIUM CHLORIDE 10 MEQ: 7.46 INJECTION, SOLUTION INTRAVENOUS at 01:03

## 2020-03-25 RX ADMIN — POTASSIUM CHLORIDE 10 MEQ: 7.46 INJECTION, SOLUTION INTRAVENOUS at 06:03

## 2020-03-25 RX ADMIN — INSULIN ASPART 4 UNITS: 100 INJECTION, SOLUTION INTRAVENOUS; SUBCUTANEOUS at 10:03

## 2020-03-25 RX ADMIN — CARVEDILOL 25 MG: 25 TABLET, FILM COATED ORAL at 09:03

## 2020-03-25 RX ADMIN — HYDRALAZINE HYDROCHLORIDE 20 MG: 20 INJECTION INTRAMUSCULAR; INTRAVENOUS at 07:03

## 2020-03-25 RX ADMIN — DEXMEDETOMIDINE HYDROCHLORIDE 1.4 MCG/KG/HR: 100 INJECTION, SOLUTION, CONCENTRATE INTRAVENOUS at 01:03

## 2020-03-25 RX ADMIN — DEXMEDETOMIDINE HYDROCHLORIDE 1.2 MCG/KG/HR: 100 INJECTION, SOLUTION, CONCENTRATE INTRAVENOUS at 09:03

## 2020-03-25 RX ADMIN — INSULIN ASPART 2 UNITS: 100 INJECTION, SOLUTION INTRAVENOUS; SUBCUTANEOUS at 10:03

## 2020-03-25 RX ADMIN — ENOXAPARIN SODIUM 40 MG: 100 INJECTION SUBCUTANEOUS at 09:03

## 2020-03-25 RX ADMIN — ENOXAPARIN SODIUM 40 MG: 100 INJECTION SUBCUTANEOUS at 10:03

## 2020-03-25 RX ADMIN — HYDRALAZINE HYDROCHLORIDE 100 MG: 50 TABLET ORAL at 02:03

## 2020-03-25 RX ADMIN — MAGNESIUM SULFATE HEPTAHYDRATE 2 G: 40 INJECTION, SOLUTION INTRAVENOUS at 10:03

## 2020-03-25 RX ADMIN — ISOSORBIDE DINITRATE 20 MG: 10 TABLET ORAL at 09:03

## 2020-03-25 RX ADMIN — INSULIN DETEMIR 20 UNITS: 100 INJECTION, SOLUTION SUBCUTANEOUS at 10:03

## 2020-03-25 RX ADMIN — POTASSIUM CHLORIDE 40 MEQ: 400 INJECTION, SOLUTION INTRAVENOUS at 04:03

## 2020-03-25 RX ADMIN — HYDRALAZINE HYDROCHLORIDE 10 MG: 20 INJECTION INTRAMUSCULAR; INTRAVENOUS at 04:03

## 2020-03-25 RX ADMIN — INSULIN DETEMIR 40 UNITS: 100 INJECTION, SOLUTION SUBCUTANEOUS at 10:03

## 2020-03-25 RX ADMIN — METHYLPREDNISOLONE SODIUM SUCCINATE 20 MG: 40 INJECTION, POWDER, FOR SOLUTION INTRAMUSCULAR; INTRAVENOUS at 10:03

## 2020-03-25 NOTE — NURSING
Patient passed bedside swallow study, however she is unable to stay off bipap long enough to take pills without desatting to 85-87%. Therefore unable to give PM antihypertensives. CCT notified, PRN hydralazine ordered

## 2020-03-25 NOTE — ASSESSMENT & PLAN NOTE
ARDS 2/2 positive COVID 19 (tested at [facility])   On transfer: , ferritin 368, D-Dimer >33. CRP down trending (353 at admit, 22.4 on 3/24)   - Flu neg    - Plaquenil, azithromycin and ceftriaxone complete    - Started on remdezivir; daily labs to monitor include CBC, CMP, PT/INR, aPTT, UA ordered    - Extubated to BiPAP and well tolerated; transitioned to comfort flow on 3/25   - Precedex for agitation; will wean off as tolerated    - Continue methylprednisolone 20mg taper QD   - Diuresis with 40 Q6H lasix; stop tomorrow   - Goal is stepdown to floor on comfort flow

## 2020-03-25 NOTE — SUBJECTIVE & OBJECTIVE
Interval History/Significant Events: No acute events overnight. Remained on BiPAP 15/8 55% was switched to Comfort Flows due to increasing agitation despite SAO2 > 95%, now resting comfortably     ROS limited due to COVID isolation status  Review of Systems   Constitutional: Negative for fever.   HENT: Negative for facial swelling.    Respiratory: Positive for shortness of breath. Negative for choking.    Gastrointestinal: Negative for constipation and vomiting.   Genitourinary: Negative for difficulty urinating.   Neurological: Negative for seizures, syncope and facial asymmetry.   Psychiatric/Behavioral: Positive for agitation.     Objective:     Vital Signs (Most Recent):  Temp: 98.6 °F (37 °C) (03/25/20 0701)  Pulse: (!) 118 (03/25/20 0800)  Resp: (!) 36 (03/25/20 0800)  BP: 93/62 (03/24/20 1145)  SpO2: 97 % (03/25/20 0800) Vital Signs (24h Range):  Temp:  [98.2 °F (36.8 °C)-98.6 °F (37 °C)] 98.6 °F (37 °C)  Pulse:  [] 118  Resp:  [17-37] 36  SpO2:  [89 %-100 %] 97 %  BP: (93)/(62) 93/62  Arterial Line BP: (119-199)/(5-101) 195/64   Weight: (!) 156.8 kg (345 lb 10.9 oz)  Body mass index is 48.21 kg/m².      Intake/Output Summary (Last 24 hours) at 3/25/2020 1133  Last data filed at 3/25/2020 0800  Gross per 24 hour   Intake 200 ml   Output 4950 ml   Net -4750 ml       Physical Exam Limited due to COVID isolation status  Patient sitting upright on comfort flow, breathing comfortably  While on BIPAP: Tearful, agitated, tachycardic (110s-130s), attempting to remove mask, but responding to commands, GCS 15    Lines/Drains/Airways     Central Venous Catheter Line            Percutaneous Central Line Insertion/Assessment - Triple Lumen  03/17/20 1100 right internal jugular 8 days          Drain                 Urethral Catheter 03/22/20 1700 2 days          Arterial Line                 Arterial Line 03/18/20 1344 Right Radial 6 days          Peripheral Intravenous Line                 Peripheral IV - Single  Lumen 03/17/20  Anterior;Distal;Right Forearm 8 days         Peripheral IV - Single Lumen 03/22/20 1130 22 G Left Hand 3 days         Peripheral IV - Single Lumen 03/22/20 1236 22 G Left Forearm 2 days              Significant Labs:    CBC/Anemia Profile:  Recent Labs   Lab 03/24/20  0435 03/25/20  0413   WBC 5.48 10.77   HGB 10.6* 11.3*   HCT 35.9* 38.8    303   MCV 85 86   RDW 13.6 13.9        Chemistries:  Recent Labs   Lab 03/24/20  0435 03/25/20  0413    144   K 3.1* 2.8*   CL 99 96   CO2 32* 35*   BUN 19 20   CREATININE 0.7 0.7   CALCIUM 8.9 9.2   ALBUMIN 2.1* 2.7*   PROT 6.1 6.7   BILITOT 0.4 0.7   ALKPHOS 53* 63   ALT 14 31   AST 21 34   MG 1.6 1.7   PHOS 2.9 3.8     Lipase 230 yesterday now 360    Significant Imaging:  No new imaging to report

## 2020-03-25 NOTE — ASSESSMENT & PLAN NOTE
- 25mg BID, Q6h hydralazine 100mg scheduled on 3/21, amlodipine 10 mg qd, Isosorbide dinitrate 20 TID   - Hydralazine PRN 10mg IV SBP >180    - Cardene discontinued

## 2020-03-25 NOTE — ASSESSMENT & PLAN NOTE
- Likely due to diuresis with lasix    - Hypo-K at 2.8 this AM, s/p KCl supplementation (2 x 40 mEQ today), will continue to trend as patient on SQ insulin

## 2020-03-25 NOTE — ASSESSMENT & PLAN NOTE
- Lipase of 230 3/23 --> 360 3/25: concern for pancreatitis given patient had    - TGs 800s last week at 570s few days ago 2/2 propofol use (off for over 4d).    - Patient does not have convincing clinical signs of pancreatitis     - Continue to trend Lipase to ensure resolution. If lipase continues to rise or enters 1000s range   - Will consider CT A/P to evaluate for pancreatitis if further progression

## 2020-03-25 NOTE — SUBJECTIVE & OBJECTIVE
Interval History: requiring 15L comfort flow in recliner, extubated yesterday    Review of Systems   Unable to perform ROS: Acuity of condition     Objective:     Vital Signs (Most Recent):  Temp: 98.6 °F (37 °C) (03/25/20 1500)  Pulse: 98 (03/25/20 1617)  Resp: (!) 26 (03/25/20 1617)  BP: 93/62 (03/24/20 1145)  SpO2: (!) 91 % (03/25/20 1617) Vital Signs (24h Range):  Temp:  [98.2 °F (36.8 °C)-98.6 °F (37 °C)] 98.6 °F (37 °C)  Pulse:  [] 98  Resp:  [20-37] 26  SpO2:  [90 %-100 %] 91 %  Arterial Line BP: (100-199)/(49-82) 144/69     Weight: (!) 156.8 kg (345 lb 10.9 oz)  Body mass index is 48.21 kg/m².    Estimated Creatinine Clearance: 200.5 mL/min (based on SCr of 0.7 mg/dL).    Physical Exam   Constitutional: She appears well-developed and well-nourished.   Laying in recliner on 15L comfort flow   Nursing note and vitals reviewed.  This is a visual exam. Patient not examined because of CoVID19 isolation to limit exposure and use of PPE, discussed with RN      Significant Labs:   CBC:   Recent Labs   Lab 03/24/20  0435 03/25/20  0413   WBC 5.48 10.77   HGB 10.6* 11.3*   HCT 35.9* 38.8    303     CMP:   Recent Labs   Lab 03/24/20  0435 03/25/20  0413    144   K 3.1* 2.8*   CL 99 96   CO2 32* 35*   * 166*   BUN 19 20   CREATININE 0.7 0.7   CALCIUM 8.9 9.2   PROT 6.1 6.7   ALBUMIN 2.1* 2.7*   BILITOT 0.4 0.7   ALKPHOS 53* 63   AST 21 34   ALT 14 31   ANIONGAP 11 13   EGFRNONAA >60.0 >60.0     Microbiology Results (last 7 days)     Procedure Component Value Units Date/Time    Blood culture [[id number]] Collected:  03/19/20 2300    Order Status:  Completed Specimen:  Blood from Peripheral, Forearm, Left Updated:  03/25/20 0612     Blood Culture, Routine No growth after 5 days.    Blood culture [[id number]] Collected:  03/19/20 2300    Order Status:  Completed Specimen:  Blood from Peripheral, Hand, Left Updated:  03/25/20 0612     Blood Culture, Routine No growth after 5 days.    Culture,  Respiratory with Gram Stain [[id number]] Collected:  03/20/20 0119    Order Status:  Completed Specimen:  Respiratory from Endotracheal Aspirate Updated:  03/23/20 1035     Respiratory Culture Normal respiratory prasanth      No S aureus or Pseudomonas isolated.     Gram Stain (Respiratory) <10 epithelial cells per low power field.     Gram Stain (Respiratory) Rare WBC's     Gram Stain (Respiratory) No organisms seen    Culture, Respiratory with Gram Stain [[id number]] Collected:  03/19/20 2201    Order Status:  Canceled Specimen:  Respiratory from Tracheal Aspirate         All pertinent labs within the past 24 hours have been reviewed.    Significant Imaging: I have reviewed all pertinent imaging results/findings within the past 24 hours.

## 2020-03-25 NOTE — PROGRESS NOTES
[facility]  Infectious Disease  Progress Note    Patient Name: Riri Gonsalves  MRN: [id number]  Admission Date: 3/17/2020  Length of Stay: 8 days  Attending Physician: Ankush Shaihk*  Primary Care Provider: Jocelynn Brooks MD    Isolation Status: Airborne and Contact and Droplet  Assessment/Plan:      * Acute respiratory failure with CoVID19 +  Pt is 29yoF w/ PMH DM2, HTN, and benign intracranial HTN transferred to [facility] on 3/17 for possible ECMOAs per chart review and info from OSH pt had rhinorrhea & dry cough for several days- went to urgent care- was discharged home on amoxicillin. A few days later she was continuing to have fevers as high as 103 & was having worsening SOB at rest- went to [facility]  3/13 with SOB, fevers, and cough.   Was on 2L oxygen. Labs on presentation were CRP 18.2, WBC 3.7, flu negative. Pt was admitted to medicine & started on empiric CAP coverage. Pt's condition continued to deteriorate- worsening leukopenia, lymphopenia, elevated LDH, & CRP, & increasing oxygen requirements. CXR showed worsening multifocal airspace disease. Pt in ARDS. Pt was transferred to MICU on 3/14. Pt decompensated & was intubated on 3/15 & on 3/16 results came back positive for Covid 19. Echo showed normal LV function (EF 60%). Pt was also started on methylprednisolone. 3/17 transferred to [facility]. ID consulted for COVID 19 management. Extubated on 3/24  Hydroxychloroquine started 3/18- course completed 3/22, off Azithro, CFTX for 3 days. Remdesivir started 3/23    On comfort flow 15L today  Recommendations  -- Continue COVID 19 isolation protocol, limit visitors  -- Continue 10 day course of Remdesivir. Started 3/23 with loading dose; continue 100mg IV daily for total 9 days      Case discussed with Dr Egan    Thank you for your consult. I will follow-up with patient. Please contact us if you have any additional questions.    Floyd Witt MD   ID Fellow  Infectious  Disease  [facility]    Subjective:     Principal Problem:Acute respiratory failure    HPI: History obtained from review of chart.  Pt is 28 y/o female w/ PMH DM2, HTN, and benign intracranial HTN who presented to [facility] on 3/13 complaining of shortness of breath, fevers, and cough X several days.  Prior to her presentation, she had been treated with amoxicillin with limited benefit.    She was admitted at [facility]. Pt's condition continued to deteriorate- worsening leukopenia, lymphopenia, elevated LDH, & CRP, & increasing oxygen requirements. CXR showed worsening multifocal airspace disease. Pt was transferred to MICU on 3/14. Pt decompensated &was intubated on 3/15 & on 3/16 results came back positive for Covid 19. Pt presents as transfer from [facility] ICU for possible ecmo.  Interval History: requiring 15L comfort flow in recliner, extubated yesterday    Review of Systems   Unable to perform ROS: Acuity of condition     Objective:     Vital Signs (Most Recent):  Temp: 98.6 °F (37 °C) (03/25/20 1500)  Pulse: 98 (03/25/20 1617)  Resp: (!) 26 (03/25/20 1617)  BP: 93/62 (03/24/20 1145)  SpO2: (!) 91 % (03/25/20 1617) Vital Signs (24h Range):  Temp:  [98.2 °F (36.8 °C)-98.6 °F (37 °C)] 98.6 °F (37 °C)  Pulse:  [] 98  Resp:  [20-37] 26  SpO2:  [90 %-100 %] 91 %  Arterial Line BP: (100-199)/(49-82) 144/69     Weight: (!) 156.8 kg (345 lb 10.9 oz)  Body mass index is 48.21 kg/m².    Estimated Creatinine Clearance: 200.5 mL/min (based on SCr of 0.7 mg/dL).    Physical Exam   Constitutional: She appears well-developed and well-nourished.   Laying in recliner on 15L comfort flow   Nursing note and vitals reviewed.  This is a visual exam. Patient not examined because of CoVID19 isolation to limit exposure and use of PPE, discussed with RN      Significant Labs:   CBC:   Recent Labs   Lab 03/24/20  0435 03/25/20  0413   WBC 5.48 10.77   HGB 10.6* 11.3*   HCT 35.9* 38.8    303     CMP:   Recent  Labs   Lab 03/24/20  0435 03/25/20  0413    144   K 3.1* 2.8*   CL 99 96   CO2 32* 35*   * 166*   BUN 19 20   CREATININE 0.7 0.7   CALCIUM 8.9 9.2   PROT 6.1 6.7   ALBUMIN 2.1* 2.7*   BILITOT 0.4 0.7   ALKPHOS 53* 63   AST 21 34   ALT 14 31   ANIONGAP 11 13   EGFRNONAA >60.0 >60.0     Microbiology Results (last 7 days)     Procedure Component Value Units Date/Time    Blood culture [[id number]] Collected:  03/19/20 2300    Order Status:  Completed Specimen:  Blood from Peripheral, Forearm, Left Updated:  03/25/20 0612     Blood Culture, Routine No growth after 5 days.    Blood culture [[id number]] Collected:  03/19/20 2300    Order Status:  Completed Specimen:  Blood from Peripheral, Hand, Left Updated:  03/25/20 0612     Blood Culture, Routine No growth after 5 days.    Culture, Respiratory with Gram Stain [[id number]] Collected:  03/20/20 0119    Order Status:  Completed Specimen:  Respiratory from Endotracheal Aspirate Updated:  03/23/20 1035     Respiratory Culture Normal respiratory prasanth      No S aureus or Pseudomonas isolated.     Gram Stain (Respiratory) <10 epithelial cells per low power field.     Gram Stain (Respiratory) Rare WBC's     Gram Stain (Respiratory) No organisms seen    Culture, Respiratory with Gram Stain [[id number]] Collected:  03/19/20 2201    Order Status:  Canceled Specimen:  Respiratory from Tracheal Aspirate         All pertinent labs within the past 24 hours have been reviewed.    Significant Imaging: I have reviewed all pertinent imaging results/findings within the past 24 hours.

## 2020-03-25 NOTE — ASSESSMENT & PLAN NOTE
Pt is 29yoF w/ PMH DM2, HTN, and benign intracranial HTN transferred to [facility] [place] 3/17 for possible ECMOAs per chart review and info from OSH pt had rhinorrhea & dry cough for several days- went to urgent care- was discharged home on amoxicillin. A few days later she was continuing to have fevers as high as 103 & was having worsening SOB at rest- went to [facility]  3/13 with SOB, fevers, and cough.   Was on 2L oxygen. Labs on presentation were CRP 18.2, WBC 3.7, flu negative. Pt was admitted to medicine & started on empiric CAP coverage. Pt's condition continued to deteriorate- worsening leukopenia, lymphopenia, elevated LDH, & CRP, & increasing oxygen requirements. CXR showed worsening multifocal airspace disease. Pt in ARDS. Pt was transferred to MICU on 3/14. Pt decompensated & was intubated on 3/15 & on 3/16 results came back positive for Covid 19. Echo showed normal LV function (EF 60%). Pt was also started on methylprednisolone. 3/17 transferred to [facility]. ID consulted for COVID 19 management. Extubated on 3/24  Hydroxychloroquine started 3/18- course completed 3/22, off Azithro, CFTX for 3 days. Remdesivir started 3/23    On comfort flow 15L today  Recommendations  -- Continue COVID 19 isolation protocol, limit visitors  -- Continue 10 day course of Remdesivir. Started 3/23 with loading dose; continue 100mg IV daily for total 9 days

## 2020-03-25 NOTE — PROGRESS NOTES
[facility]  Critical Care Medicine  Progress Note    Patient Name: Riri Gonsalves  MRN: [id number]  Admission Date: 3/17/2020  Hospital Length of Stay: 8 days  Code Status: Full Code  Attending Provider: Ankush Shaikh*  Primary Care Provider: Jocelynn Brooks MD   Principal Problem: Acute respiratory failure    Subjective:     HPI:  Pt is 29yoF w/ PMH DM2, HTN, and benign intracranial HTN who presented to [facility] on 3/13 complaining of shortness of breath, fevers, and cough. 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 A few days later she was continuing to have fevers as high as 103 & was having worsening SOB at rest- went to [facility] ED & was initially feeling better on 2L NC. Labs on presentation were CRP 18.2, WBC 3.7, flu negative. Pt was admitted to medicine & started on empiric CAP coverage. Pt's condition continued to deteriorate- worsening leukopenia, lymphopenia, elevated LDH, & CRP, & increasing oxygen requirements. CXR showed worsening multifocal airspace disease. Pt was transferred to MICU on 3/14. Pt decompensated & was intubated on 3/15 & on 3/16 results came back positive for Covid 19. Echo showed normal LV function (EF 60%). Pt was proned on 3/16. Pt was supinated prior to transfer. Pt presents as transfer from [facility] ICU for possible ecmo.     Hospital/ICU Course:  Patient was transferred intubated and sedated from [facility] ICU for possible ECMO given hypoxic respiratory failure due to ARDS 2/2 COVID-19 and was proned on 3/17. Cardine started for HTN and insulin drip for hyperglycemia (Hx uncontrolled T2DM). On 3/18 she was supinated with improvement of vent settings and oxygenation. Started on Remdesivir 200mg on 3/23, after receiving Plaquenil and azithromycin. Patient successfully  extubated to BiPAP 55% Fio2, 15/5  on 3/24 and transitioned to comfort flow on 3/25.         Interval History/Significant Events: No acute events overnight. Remained on BiPAP 15/8 55% was switched to Comfort Flow and tolerated well. Tolerating PO and will advance diet.     ROS limited due to COVID isolation status  Review of Systems   Constitutional: Negative for fever.   HENT: Negative for facial swelling.    Respiratory: Positive for shortness of breath. Negative for choking.    Gastrointestinal: Negative for constipation and vomiting.   Genitourinary: Negative for difficulty urinating.   Neurological: Negative for seizures, syncope and facial asymmetry.   Psychiatric/Behavioral: Positive for agitation.     Objective:     Vital Signs (Most Recent):  Temp: 98.6 °F (37 °C) (03/25/20 0701)  Pulse: (!) 118 (03/25/20 0800)  Resp: (!) 36 (03/25/20 0800)  BP: 93/62 (03/24/20 1145)  SpO2: 97 % (03/25/20 0800) Vital Signs (24h Range):  Temp:  [98.2 °F (36.8 °C)-98.6 °F (37 °C)] 98.6 °F (37 °C)  Pulse:  [] 118  Resp:  [17-37] 36  SpO2:  [89 %-100 %] 97 %  BP: (93)/(62) 93/62  Arterial Line BP: (119-199)/(5-101) 195/64   Weight: (!) 156.8 kg (345 lb 10.9 oz)  Body mass index is 48.21 kg/m².      Intake/Output Summary (Last 24 hours) at 3/25/2020 1133  Last data filed at 3/25/2020 0800  Gross per 24 hour   Intake 200 ml   Output 4950 ml   Net -4750 ml       Physical Exam Limited due to COVID isolation status  Patient sitting upright on comfort flow, breathing comfortably  While on BIPAP: Tearful, agitated, tachycardic (110s-130s), attempting to remove mask, but responding to commands, GCS 15    Lines/Drains/Airways     Central Venous Catheter Line            Percutaneous Central Line Insertion/Assessment - Triple Lumen  03/17/20 1100 right internal jugular 8 days          Drain                 Urethral Catheter 03/22/20 1700 2 days          Arterial Line                 Arterial Line 03/18/20 1344 Right Radial 6 days           Peripheral Intravenous Line                 Peripheral IV - Single Lumen 03/17/20  Anterior;Distal;Right Forearm 8 days         Peripheral IV - Single Lumen 03/22/20 1130 22 G Left Hand 3 days         Peripheral IV - Single Lumen 03/22/20 1236 22 G Left Forearm 2 days              Significant Labs:    CBC/Anemia Profile:  Recent Labs   Lab 03/24/20  0435 03/25/20  0413   WBC 5.48 10.77   HGB 10.6* 11.3*   HCT 35.9* 38.8    303   MCV 85 86   RDW 13.6 13.9        Chemistries:  Recent Labs   Lab 03/24/20  0435 03/25/20  0413    144   K 3.1* 2.8*   CL 99 96   CO2 32* 35*   BUN 19 20   CREATININE 0.7 0.7   CALCIUM 8.9 9.2   ALBUMIN 2.1* 2.7*   PROT 6.1 6.7   BILITOT 0.4 0.7   ALKPHOS 53* 63   ALT 14 31   AST 21 34   MG 1.6 1.7   PHOS 2.9 3.8     Lipase 230 yesterday now 360    Significant Imaging:  No new imaging to report      ABG  Recent Labs   Lab 03/24/20  0517   PH 7.500*   PO2 76*   PCO2 48.5*   HCO3 37.8*   BE 15     Assessment/Plan:     Pulmonary  * Acute respiratory failure with CoVID19 +  ARDS 2/2 positive COVID 19 (tested at [facility])   On transfer: , ferritin 368, D-Dimer >33. CRP down trending (353 at admit, 22.4 on 3/24)   - Flu neg    - Plaquenil, azithromycin and ceftriaxone complete    - Started on remdezivir; daily labs to monitor include CBC, CMP, PT/INR, aPTT, UA ordered    - Extubated to BiPAP and well tolerated; transitioned to comfort flow on 3/25   - Precedex for agitation; will wean off as tolerated    - Continue methylprednisolone 20mg taper QD   - Diuresis with 40 Q6H lasix; stop tomorrow   - Goal is stepdown to floor on comfort flow    Cardiac/Vascular  Essential hypertension   - 25mg BID, Q6h hydralazine 100mg scheduled on 3/21, amlodipine 10 mg qd, Isosorbide dinitrate 20 TID   - Hydralazine PRN 10mg IV SBP >180    - Cardene discontinued    Renal/  Hypokalemia   - Likely due to diuresis with lasix    - Hypo-K at 2.8 this AM, s/p KCl supplementation (2 x 40 mEQ  today), will continue to trend as patient on SQ insulin    Endocrine  Type 2 diabetes mellitus without complication, without long-term current use of insulin  Insulin dependent type 2 DM   - SQ 20U detemir BID   - SSI    - POCT Glucose     Other  Elevated lipase   - Lipase of 230 3/23 --> 360 3/25: concern for pancreatitis given patient had    - TGs 800s last week at 570s few days ago 2/2 propofol use (off for over 4d).    - Patient does not have convincing clinical signs of pancreatitis     - Continue to trend Lipase to ensure resolution. If lipase continues to rise or enters 1000s range   - Will consider CT A/P to evaluate for pancreatitis     Critical Care Daily Checklist:    A: Awake: RASS Goal/Actual Goal: RASS Goal: 0-->alert and calm  Actual: Cowan Agitation Sedation Scale (RASS): Restless   B: Spontaneous Breathing Trial Performed? Spon. Breathing Trial Initiated?: Initiated (03/24/20 0825)   C: SAT & SBT Coordinated?  N/A off vent                      D: Delirium: CAM-ICU Overall CAM-ICU: Positive   E: Early Mobility Performed? Yes   F: Feeding Goal: Goals: Meet % EEN, EPN by RD f/u date  Status: Nutrition Goal Status: progressing towards goal   Current Diet Order   Procedures    Diet full liquid      AS: Analgesia/Sedation Off sedation   T: Thromboembolic Prophylaxis Enoxaparin 40mg SQ BD   H: HOB > 300 Yes   U: Stress Ulcer Prophylaxis (if needed) No   G: Glucose Control -180, SC Insulin 20U BD   B: Bowel Function Stool Occurrence: 0   I: Indwelling Catheter (Lines & Saxena) Necessity 3 x P-IV, A-line, Saxena   D: De-escalation of Antimicrobials/Pharmacotherapies Off antibiotics, on Remdesivir    Plan for the day/ETD Step down to boone on comfort flows, stop diuretics, manage BP, keep BG at target above, continue Remdesivir    Code Status:  Family/Goals of Care: Full Code       Critical secondary to Patient has a condition that poses threat to life and bodily function: Severe Respiratory  Distress      Critical care was time spent personally by me on the following activities: development of treatment plan with patient or surrogate and bedside caregivers, discussions with consultants, evaluation of patient's response to treatment, examination of patient, ordering and performing treatments and interventions, ordering and review of laboratory studies, ordering and review of radiographic studies, pulse oximetry, re-evaluation of patient's condition. This critical care time did not overlap with that of any other provider or involve time for any procedures.     Sara Justice MD  Critical Care Medicine  [facility]

## 2020-03-26 LAB
ALBUMIN SERPL BCP-MCNC: 2.4 G/DL (ref 3.5–5.2)
ALP SERPL-CCNC: 55 U/L (ref 55–135)
ALT SERPL W/O P-5'-P-CCNC: 30 U/L (ref 10–44)
ANION GAP SERPL CALC-SCNC: 8 MMOL/L (ref 8–16)
APTT BLDCRRT: 27.4 SEC (ref 21–32)
AST SERPL-CCNC: 36 U/L (ref 10–40)
BACTERIA #/AREA URNS AUTO: ABNORMAL /HPF
BASOPHILS # BLD AUTO: 0.01 K/UL (ref 0–0.2)
BASOPHILS NFR BLD: 0.2 % (ref 0–1.9)
BILIRUB SERPL-MCNC: 0.7 MG/DL (ref 0.1–1)
BILIRUB UR QL STRIP: NEGATIVE
BUN SERPL-MCNC: 21 MG/DL (ref 6–20)
CALCIUM SERPL-MCNC: 8.6 MG/DL (ref 8.7–10.5)
CHLORIDE SERPL-SCNC: 96 MMOL/L (ref 95–110)
CLARITY UR REFRACT.AUTO: ABNORMAL
CO2 SERPL-SCNC: 34 MMOL/L (ref 23–29)
COLOR UR AUTO: YELLOW
CREAT SERPL-MCNC: 0.7 MG/DL (ref 0.5–1.4)
CRP SERPL-MCNC: 65.9 MG/L (ref 0–8.2)
DIFFERENTIAL METHOD: ABNORMAL
EOSINOPHIL # BLD AUTO: 0.1 K/UL (ref 0–0.5)
EOSINOPHIL NFR BLD: 1.5 % (ref 0–8)
ERYTHROCYTE [DISTWIDTH] IN BLOOD BY AUTOMATED COUNT: 14 % (ref 11.5–14.5)
EST. GFR  (AFRICAN AMERICAN): >60 ML/MIN/1.73 M^2
EST. GFR  (NON AFRICAN AMERICAN): >60 ML/MIN/1.73 M^2
GLUCOSE SERPL-MCNC: 183 MG/DL (ref 70–110)
GLUCOSE UR QL STRIP: NEGATIVE
HCT VFR BLD AUTO: 34.6 % (ref 37–48.5)
HGB BLD-MCNC: 9.9 G/DL (ref 12–16)
HGB UR QL STRIP: ABNORMAL
IMM GRANULOCYTES # BLD AUTO: 0.05 K/UL (ref 0–0.04)
IMM GRANULOCYTES NFR BLD AUTO: 0.8 % (ref 0–0.5)
INR PPP: 1.1 (ref 0.8–1.2)
KETONES UR QL STRIP: NEGATIVE
LACTATE SERPL-SCNC: 0.8 MMOL/L (ref 0.5–2.2)
LEUKOCYTE ESTERASE UR QL STRIP: NEGATIVE
LYMPHOCYTES # BLD AUTO: 1.8 K/UL (ref 1–4.8)
LYMPHOCYTES NFR BLD: 30.3 % (ref 18–48)
MAGNESIUM SERPL-MCNC: 2.2 MG/DL (ref 1.6–2.6)
MCH RBC QN AUTO: 24.9 PG (ref 27–31)
MCHC RBC AUTO-ENTMCNC: 28.6 G/DL (ref 32–36)
MCV RBC AUTO: 87 FL (ref 82–98)
MICROSCOPIC COMMENT: ABNORMAL
MONOCYTES # BLD AUTO: 0.5 K/UL (ref 0.3–1)
MONOCYTES NFR BLD: 7.6 % (ref 4–15)
NEUTROPHILS # BLD AUTO: 3.6 K/UL (ref 1.8–7.7)
NEUTROPHILS NFR BLD: 59.6 % (ref 38–73)
NITRITE UR QL STRIP: NEGATIVE
NRBC BLD-RTO: 0 /100 WBC
PH UR STRIP: 5 [PH] (ref 5–8)
PHOSPHATE SERPL-MCNC: 4.4 MG/DL (ref 2.7–4.5)
PLATELET # BLD AUTO: 212 K/UL (ref 150–350)
PMV BLD AUTO: 11.2 FL (ref 9.2–12.9)
POCT GLUCOSE: 138 MG/DL (ref 70–110)
POCT GLUCOSE: 183 MG/DL (ref 70–110)
POCT GLUCOSE: 193 MG/DL (ref 70–110)
POTASSIUM SERPL-SCNC: 3.3 MMOL/L (ref 3.5–5.1)
PROT SERPL-MCNC: 6 G/DL (ref 6–8.4)
PROT UR QL STRIP: NEGATIVE
PROTHROMBIN TIME: 11.6 SEC (ref 9–12.5)
RBC # BLD AUTO: 3.97 M/UL (ref 4–5.4)
RBC #/AREA URNS AUTO: 37 /HPF (ref 0–4)
SODIUM SERPL-SCNC: 138 MMOL/L (ref 136–145)
SP GR UR STRIP: 1.02 (ref 1–1.03)
URN SPEC COLLECT METH UR: ABNORMAL
WBC # BLD AUTO: 6.03 K/UL (ref 3.9–12.7)
WBC #/AREA URNS AUTO: 10 /HPF (ref 0–5)

## 2020-03-26 PROCEDURE — 94761 N-INVAS EAR/PLS OXIMETRY MLT: CPT

## 2020-03-26 PROCEDURE — 97535 SELF CARE MNGMENT TRAINING: CPT

## 2020-03-26 PROCEDURE — 99233 SBSQ HOSP IP/OBS HIGH 50: CPT | Mod: ,,, | Performed by: INTERNAL MEDICINE

## 2020-03-26 PROCEDURE — 99233 PR SUBSEQUENT HOSPITAL CARE,LEVL III: ICD-10-PCS | Mod: ,,, | Performed by: INTERNAL MEDICINE

## 2020-03-26 PROCEDURE — 25000003 PHARM REV CODE 250: Performed by: STUDENT IN AN ORGANIZED HEALTH CARE EDUCATION/TRAINING PROGRAM

## 2020-03-26 PROCEDURE — 85610 PROTHROMBIN TIME: CPT

## 2020-03-26 PROCEDURE — 85025 COMPLETE CBC W/AUTO DIFF WBC: CPT

## 2020-03-26 PROCEDURE — 25000003 PHARM REV CODE 250: Performed by: INTERNAL MEDICINE

## 2020-03-26 PROCEDURE — 99900026 HC AIRWAY MAINTENANCE (STAT)

## 2020-03-26 PROCEDURE — 99900035 HC TECH TIME PER 15 MIN (STAT)

## 2020-03-26 PROCEDURE — 94660 CPAP INITIATION&MGMT: CPT

## 2020-03-26 PROCEDURE — 84100 ASSAY OF PHOSPHORUS: CPT

## 2020-03-26 PROCEDURE — 97162 PT EVAL MOD COMPLEX 30 MIN: CPT

## 2020-03-26 PROCEDURE — 63600175 PHARM REV CODE 636 W HCPCS: Performed by: STUDENT IN AN ORGANIZED HEALTH CARE EDUCATION/TRAINING PROGRAM

## 2020-03-26 PROCEDURE — 97165 OT EVAL LOW COMPLEX 30 MIN: CPT

## 2020-03-26 PROCEDURE — 63600175 PHARM REV CODE 636 W HCPCS: Performed by: INTERNAL MEDICINE

## 2020-03-26 PROCEDURE — 20000000 HC ICU ROOM

## 2020-03-26 PROCEDURE — 81001 URINALYSIS AUTO W/SCOPE: CPT

## 2020-03-26 PROCEDURE — 80053 COMPREHEN METABOLIC PANEL: CPT

## 2020-03-26 PROCEDURE — 83605 ASSAY OF LACTIC ACID: CPT

## 2020-03-26 PROCEDURE — 27100171 HC OXYGEN HIGH FLOW UP TO 24 HOURS

## 2020-03-26 PROCEDURE — 85730 THROMBOPLASTIN TIME PARTIAL: CPT

## 2020-03-26 PROCEDURE — 97530 THERAPEUTIC ACTIVITIES: CPT

## 2020-03-26 PROCEDURE — 97110 THERAPEUTIC EXERCISES: CPT

## 2020-03-26 PROCEDURE — 86140 C-REACTIVE PROTEIN: CPT

## 2020-03-26 PROCEDURE — 83735 ASSAY OF MAGNESIUM: CPT

## 2020-03-26 RX ORDER — AMLODIPINE BESYLATE 10 MG/1
10 TABLET ORAL DAILY
Status: DISCONTINUED | OUTPATIENT
Start: 2020-03-27 | End: 2020-03-30 | Stop reason: HOSPADM

## 2020-03-26 RX ORDER — AMOXICILLIN 250 MG
1 CAPSULE ORAL DAILY
Status: DISCONTINUED | OUTPATIENT
Start: 2020-03-27 | End: 2020-03-30 | Stop reason: HOSPADM

## 2020-03-26 RX ORDER — FUROSEMIDE 10 MG/ML
40 INJECTION INTRAMUSCULAR; INTRAVENOUS DAILY
Status: DISCONTINUED | OUTPATIENT
Start: 2020-03-27 | End: 2020-03-27

## 2020-03-26 RX ORDER — HYDRALAZINE HYDROCHLORIDE 50 MG/1
100 TABLET, FILM COATED ORAL EVERY 8 HOURS
Status: DISCONTINUED | OUTPATIENT
Start: 2020-03-26 | End: 2020-03-30 | Stop reason: HOSPADM

## 2020-03-26 RX ORDER — ISOSORBIDE DINITRATE 10 MG/1
20 TABLET ORAL 3 TIMES DAILY
Status: DISCONTINUED | OUTPATIENT
Start: 2020-03-26 | End: 2020-03-30 | Stop reason: HOSPADM

## 2020-03-26 RX ORDER — ENOXAPARIN SODIUM 100 MG/ML
40 INJECTION SUBCUTANEOUS EVERY 12 HOURS
Status: DISCONTINUED | OUTPATIENT
Start: 2020-03-26 | End: 2020-03-30 | Stop reason: HOSPADM

## 2020-03-26 RX ORDER — CARVEDILOL 25 MG/1
25 TABLET ORAL 2 TIMES DAILY
Status: DISCONTINUED | OUTPATIENT
Start: 2020-03-26 | End: 2020-03-30 | Stop reason: HOSPADM

## 2020-03-26 RX ADMIN — FUROSEMIDE 40 MG: 10 INJECTION, SOLUTION INTRAVENOUS at 10:03

## 2020-03-26 RX ADMIN — ISOSORBIDE DINITRATE 20 MG: 10 TABLET ORAL at 09:03

## 2020-03-26 RX ADMIN — DEXMEDETOMIDINE HYDROCHLORIDE 1.2 MCG/KG/HR: 100 INJECTION, SOLUTION, CONCENTRATE INTRAVENOUS at 12:03

## 2020-03-26 RX ADMIN — HYDRALAZINE HYDROCHLORIDE 100 MG: 50 TABLET ORAL at 05:03

## 2020-03-26 RX ADMIN — ISOSORBIDE DINITRATE 20 MG: 10 TABLET ORAL at 05:03

## 2020-03-26 RX ADMIN — POTASSIUM BICARBONATE 50 MEQ: 978 TABLET, EFFERVESCENT ORAL at 09:03

## 2020-03-26 RX ADMIN — ENOXAPARIN SODIUM 40 MG: 100 INJECTION SUBCUTANEOUS at 09:03

## 2020-03-26 RX ADMIN — AMLODIPINE BESYLATE 10 MG: 10 TABLET ORAL at 10:03

## 2020-03-26 RX ADMIN — DOCUSATE SODIUM - SENNOSIDES 1 TABLET: 50; 8.6 TABLET, FILM COATED ORAL at 10:03

## 2020-03-26 RX ADMIN — ENOXAPARIN SODIUM 40 MG: 100 INJECTION SUBCUTANEOUS at 11:03

## 2020-03-26 RX ADMIN — CARVEDILOL 25 MG: 25 TABLET, FILM COATED ORAL at 09:03

## 2020-03-26 RX ADMIN — INSULIN ASPART 2 UNITS: 100 INJECTION, SOLUTION INTRAVENOUS; SUBCUTANEOUS at 08:03

## 2020-03-26 RX ADMIN — INSULIN DETEMIR 20 UNITS: 100 INJECTION, SOLUTION SUBCUTANEOUS at 09:03

## 2020-03-26 RX ADMIN — INSULIN ASPART 5 UNITS: 100 INJECTION, SOLUTION INTRAVENOUS; SUBCUTANEOUS at 01:03

## 2020-03-26 RX ADMIN — INSULIN DETEMIR 20 UNITS: 100 INJECTION, SOLUTION SUBCUTANEOUS at 10:03

## 2020-03-26 RX ADMIN — CARVEDILOL 25 MG: 25 TABLET, FILM COATED ORAL at 10:03

## 2020-03-26 RX ADMIN — METHYLPREDNISOLONE SODIUM SUCCINATE 20 MG: 40 INJECTION, POWDER, FOR SOLUTION INTRAMUSCULAR; INTRAVENOUS at 10:03

## 2020-03-26 RX ADMIN — ISOSORBIDE DINITRATE 20 MG: 10 TABLET ORAL at 01:03

## 2020-03-26 RX ADMIN — INSULIN ASPART 5 UNITS: 100 INJECTION, SOLUTION INTRAVENOUS; SUBCUTANEOUS at 08:03

## 2020-03-26 RX ADMIN — HYDRALAZINE HYDROCHLORIDE 100 MG: 50 TABLET, FILM COATED ORAL at 09:03

## 2020-03-26 RX ADMIN — HYDRALAZINE HYDROCHLORIDE 100 MG: 50 TABLET, FILM COATED ORAL at 01:03

## 2020-03-26 RX ADMIN — INSULIN ASPART 2 UNITS: 100 INJECTION, SOLUTION INTRAVENOUS; SUBCUTANEOUS at 01:03

## 2020-03-26 RX ADMIN — DEXMEDETOMIDINE HYDROCHLORIDE 1 MCG/KG/HR: 100 INJECTION, SOLUTION, CONCENTRATE INTRAVENOUS at 04:03

## 2020-03-26 NOTE — PROGRESS NOTES
[facility]  Critical Care Medicine  Progress Note    Patient Name: Riri Gonsalves  MRN: [id number]  Admission Date: 3/17/2020  Hospital Length of Stay: 9 days  Code Status: Full Code  Attending Provider: Ankush Shaikh*  Primary Care Provider: Jocelynn Brooks MD   Principal Problem: Acute respiratory failure    Subjective:     HPI:  Pt is 29yoF w/ PMH DM2, HTN, and benign intracranial HTN who presented to [facility] on 3/13 complaining of shortness of breath, fevers, and cough. 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 A few days later she was continuing to have fevers as high as 103 & was having worsening SOB at rest- went to [facility] ED & was initially feeling better on 2L NC. Labs on presentation were CRP 18.2, WBC 3.7, flu negative. Pt was admitted to medicine & started on empiric CAP coverage. Pt's condition continued to deteriorate- worsening leukopenia, lymphopenia, elevated LDH, & CRP, & increasing oxygen requirements. CXR showed worsening multifocal airspace disease. Pt was transferred to MICU on 3/14. Pt decompensated & was intubated on 3/15 & on 3/16 results came back positive for Covid 19. Echo showed normal LV function (EF 60%). Pt was proned on 3/16. Pt was supinated prior to transfer. Pt presents as transfer from [facility] ICU for possible ecmo.     Hospital/ICU Course:  Patient was transferred intubated and sedated from [facility] ICU for possible ECMO given hypoxic respiratory failure due to ARDS 2/2 COVID-19 and was proned on 3/17. Cardine started for HTN and insulin drip for hyperglycemia (Hx uncontrolled T2DM). On 3/18 she was supinated with improvement of vent settings and oxygenation. Started on Remdesivir 200mg on 3/23, after receiving Plaquenil and azithromycin. Patient successfully  extubated to BiPAP 55% Fio2, 15/5  on 3/24 and transitioned to comfort flow on 3/25.         Interval History/Significant Events:     NAEON. Weaning oxygen. Patient continues to be extremely weak. 15L and 35% fiO2. Able to respond through the window commands.     Review of Systems   Unable to perform ROS: Severe respiratory distress     Objective:     Vital Signs (Most Recent):  Temp: 98.2 °F (36.8 °C) (03/25/20 2100)  Pulse: 70 (03/26/20 0615)  Resp: 18 (03/26/20 0615)  BP: 93/62 (03/24/20 1145)  SpO2: (!) 94 % (03/26/20 0615) Vital Signs (24h Range):  Temp:  [98.2 °F (36.8 °C)-98.6 °F (37 °C)] 98.2 °F (36.8 °C)  Pulse:  [] 70  Resp:  [14-36] 18  SpO2:  [90 %-100 %] 94 %  Arterial Line BP: (100-195)/(49-88) 149/82   Weight: (!) 156.8 kg (345 lb 10.9 oz)  Body mass index is 48.21 kg/m².      Intake/Output Summary (Last 24 hours) at 3/26/2020 0752  Last data filed at 3/26/2020 0635  Gross per 24 hour   Intake 2050.58 ml   Output 2755 ml   Net -704.42 ml       Physical Exam   Constitutional: She is oriented to person, place, and time. She appears well-developed and well-nourished.   HENT:   Head: Normocephalic and atraumatic.   Eyes: Pupils are equal, round, and reactive to light. EOM are normal.   Neck: Normal range of motion. Neck supple.   Cardiovascular: Normal rate and regular rhythm.   Pulmonary/Chest: Breath sounds normal. She is in respiratory distress. She has no wheezes. She has no rales.   Abdominal: Soft. Bowel sounds are normal. She exhibits no distension. There is no tenderness.   Musculoskeletal: Normal range of motion. She exhibits no edema or deformity.   Neurological: She is alert and oriented to person, place, and time.   Skin: Skin is warm and dry. Capillary refill takes less than 2 seconds. No erythema.       Vents:  Vent Mode: Spont (03/24/20 0825)  Ventilator Initiated: Yes (03/17/20 1619)  Set Rate: 18 BPM (03/24/20 0712)  Vt Set: 450 mL (03/24/20 0712)  Pressure Support: 5 cmH20  (03/24/20 0825)  PEEP/CPAP: 5 cmH20 (03/24/20 0825)  Oxygen Concentration (%): 35 (03/26/20 0615)  Peak Airway Pressure: 10 cmH2O (03/24/20 0825)  Plateau Pressure: 26 cmH20 (03/23/20 1956)  Total Ve: 3.82 mL (03/24/20 0825)  F/VT Ratio<105 (RSBI): (!) 57.35 (03/24/20 0825)  Lines/Drains/Airways     Central Venous Catheter Line            Percutaneous Central Line Insertion/Assessment - Triple Lumen  03/17/20 1100 right internal jugular 8 days          Drain                 Urethral Catheter 03/22/20 1700 3 days          Arterial Line                 Arterial Line 03/18/20 1344 Right Radial 7 days          Peripheral Intravenous Line                 Peripheral IV - Single Lumen 03/22/20 1130 22 G Left Hand 3 days         Peripheral IV - Single Lumen 03/22/20 1236 22 G Left Forearm 3 days              Significant Labs:    CBC/Anemia Profile:  Recent Labs   Lab 03/25/20 0413 03/26/20 0517   WBC 10.77 6.03   HGB 11.3* 9.9*   HCT 38.8 34.6*    212   MCV 86 87   RDW 13.9 14.0        Chemistries:  Recent Labs   Lab 03/25/20 0413 03/25/20  2252    140   K 2.8* 3.8   CL 96 98   CO2 35* 33*   BUN 20 20   CREATININE 0.7 0.7   CALCIUM 9.2 8.6*   ALBUMIN 2.7*  --    PROT 6.7  --    BILITOT 0.7  --    ALKPHOS 63  --    ALT 31  --    AST 34  --    MG 1.7  --    PHOS 3.8  --      BMP:   Recent Labs   Lab 03/25/20  0413 03/25/20  2252   * 205*    140   K 2.8* 3.8   CL 96 98   CO2 35* 33*   BUN 20 20   CREATININE 0.7 0.7   CALCIUM 9.2 8.6*   MG 1.7  --            ABG  Recent Labs   Lab 03/24/20 0517   PH 7.500*   PO2 76*   PCO2 48.5*   HCO3 37.8*   BE 15     Assessment/Plan:     Pulmonary  * Acute respiratory failure with CoVID19 +  ARDS 2/2 positive COVID 19 (tested at [facility])   On transfer: , ferritin 368, D-Dimer >33. CRP down trending (353 at admit, 22.4 on 3/24)   - Flu neg    - Plaquenil, azithromycin and ceftriaxone complete    - Started on remdezivir; daily labs to monitor include CBC,  CMP, PT/INR, aPTT, UA ordered    - Extubated to BiPAP and well tolerated; transitioned to comfort flow on 3/25   - Precedex for agitation; will wean off as tolerated    - Continue methylprednisolone 20mg taper QD   - Goal is stepdown to floor on comfort flow    - Lasix 40mg BID.    - Continue to wean oxygen.     Cardiac/Vascular  Essential hypertension   - 25mg BID, Q6h hydralazine 100mg scheduled on 3/21, amlodipine 10 mg qd, Isosorbide dinitrate 20 TID   - Hydralazine PRN 10mg IV SBP >180    - Cardene discontinued    Renal/  Hypokalemia   - Likely due to diuresis with lasix    - Hypo-K at 2.8 this AM, s/p KCl supplementation (2 x 40 mEQ today), will continue to trend as patient on SQ insulin    Endocrine  Type 2 diabetes mellitus without complication, without long-term current use of insulin  Insulin dependent type 2 DM   - SQ 20U detemir BID, Aspart TID.    - SSI    - POCT Glucose     Other  Elevated lipase   - Lipase of 230 3/23 --> 360 3/25: concern for pancreatitis given patient had    - TGs 800s last week at 570s few days ago 2/2 propofol use (off for over 4d).    - Patient does not have convincing clinical signs of pancreatitis     - Continue to trend Lipase to ensure resolution. If lipase continues to rise or enters 1000s range   - Will consider CT A/P to evaluate for pancreatitis if further progression       Critical Care Daily Checklist:     A: Awake: RASS Goal/Actual Goal: RASS Goal: -4-->deep sedation  Actual: Cowan Agitation Sedation Scale (RASS): Alert and calm   B: Spontaneous Breathing Trial Performed? SBT passed today, succesfully extubated to BiPAP 15/5 55% FiO2   C: SAT & SBT Coordinated?  Yes, as above                      D: Delirium: CAM-ICU Overall CAM-ICU: Positive   E: Early Mobility Performed? No   F: Feeding Goal: Goals: Meet % EEN, EPN by RD f/u date  Status: Nutrition Goal Status: progressing towards goal   Current Diet Order   No orders of the defined types were placed in  this encounter.       AS: Analgesia/Sedation Off fentanyl/propofol/nimbex, on Precedex 1 mcg/kg/hr   T: Thromboembolic Prophylaxis Enoxaparin 40 SQ BID   H: HOB > 300 Yes   U: Stress Ulcer Prophylaxis (if needed) D/C'd, patient extubated   G: Glucose Control BG goal 140-180, Off insulin drip, now SQ insulin 40U BD   B: Bowel Function Stool Occurrence: 0   I: Indwelling Catheter (Lines & Saxena) Necessity Peripheral IV x 4, Trialysis R-IJ, A-line, NG/OG tube, Saxena   D: De-escalation of Antimicrobials/Pharmacotherapies Off antibiotics, remdesivir active, plaquenil finished     Plan for the day/ETD Successfully extubated to BiPAP (will watch overnight), wean Cardene, wean sedation, BG goal 140-180 with SQ Insulin 20U BID     Code Status:  Family/Goals of Care: Full Code  Will call family today regarding extubation       Critical secondary to Patient has a condition that poses threat to life and bodily function: Pulmonary Embolism and Severe Respiratory Distress       Critical care was time spent personally by me on the following activities: development of treatment plan with patient or surrogate and bedside caregivers, discussions with consultants, evaluation of patient's response to treatment, examination of patient, ordering and performing treatments and interventions, ordering and review of laboratory studies, ordering and review of radiographic studies, pulse oximetry, re-evaluation of patient's condition. This critical care time did not overlap with that of any other provider or involve time for any procedures.     Chano Asher MD  Critical Care Medicine  [facility]

## 2020-03-26 NOTE — ASSESSMENT & PLAN NOTE
Pt is 29yoF w/ PMH DM2, HTN, and benign intracranial HTN [place] [facility] [place] for possible ECMOAs per chart review and info from OSH pt had rhinorrhea & dry cough for several days- went to urgent care- was discharged home on amoxicillin. A few days later she was continuing to have fevers as high as 103 & was having worsening SOB at rest- went to [facility]  3/13 with SOB, fevers, and cough.   Was on 2L oxygen. Labs on presentation were CRP 18.2, WBC 3.7, flu negative. Pt was admitted to medicine & started on empiric CAP coverage. Pt's condition continued to deteriorate- worsening leukopenia, lymphopenia, elevated LDH, & CRP, & increasing oxygen requirements. CXR showed worsening multifocal airspace disease. Pt in ARDS. Pt was transferred to MICU on 3/14. Pt decompensated & was intubated on 3/15 & on 3/16 results came back positive for Covid 19. Echo showed normal LV function (EF 60%). Pt was also started on methylprednisolone. 3/17 transferred to [facility]. ID consulted for COVID 19 management. Extubated on 3/24  Hydroxychloroquine started 3/18- course completed 3/22, off Azithro, CFTX for 3 days. Remdesivir started 3/23    On comfort flow 15L today  Recommendations  -- Continue COVID 19 isolation protocol, limit visitors  -- Continue 10 day course of Remdesivir. Started 3/23 with loading dose; continue 100mg IV daily; day 4/10 today  ID will sign off

## 2020-03-26 NOTE — PT/OT/SLP EVAL
"Occupational Therapy  Co- Evaluation and Treatment     Name: Riri Gonsalves  MRN: [id number]  Admitting Diagnosis:  Acute respiratory failure    Pt initially admitted to [facility] for dry cough. Pt was t/f to [facility] for possible ECMO. Pt intubated and found to be COVID (+). Pt extubated 3/24/20 to comfort flow.    Recommendations:     Discharge Recommendations: nursing facility, skilled      Assessment:     Riri Gonsalves is a 29 y.o. female with a medical diagnosis of Acute respiratory failure. Performance deficits affecting function: weakness, gait instability, decreased upper extremity function, decreased lower extremity function, impaired balance, impaired endurance, impaired self care skills, impaired functional mobilty.  Pt tolerated session fairly well with good effort and performance, but demo significant impaired endurance for functional activity. Pt is not safe for d/c home at this time and may benefit from post acute therapy pending functional gains.     Rehab Prognosis: Good; patient would benefit from acute skilled OT services to address these deficits and reach maximum level of function.       Plan:     Patient to be seen 5 x/week to address the above listed problems via self-care/home management, therapeutic activities, therapeutic exercises  · Plan of Care Expires:    · Plan of Care Reviewed with: patient    Subjective     "I feel like I can't breathe" pt states during activity.     Occupational Profile:  Pt lives alone in 2 story house with bed/bath on second floor. 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     Pain/Comfort:  · Pain Rating 1: 0/10    Patients cultural, spiritual, Mandaeism conflicts given the current situation: no    Objective:     Communicated with: nsrebecca prior to session.  Pt found supine in bed with comfort flow in place.     General Precautions: Standard, fall, (COVID-19 (+))       Occupational Performance:    Bed Mobility:    · MAX " A supine<>sit     Functional Mobility/Transfers:  · Sit>stand with MAX A   · Stand pivot bed<>chair with MAX A and second person for safety and line keyanna't.     Activities of Daily Living:  SEt-up right hand self feed  G/H seated EOB with CGA for balance and set-up for oral care  UE dressing: TOTAL A  LE dressing: TOTAL A   Toileting: TOTAL A for hygiene in sidelying; unable to stand long enough for adequate hygiene.     Cognitive/Visual Perceptual:  Pt awake, alert and follows basic commands. Pt smiling appropriately     Physical Exam:  Pt is right hand dominant and demo Fair . Pt demo 2/5 UE strength and intact light touch sensation.     AMPAC 6 Click ADL:  AMPAC Total Score: 10    Treatment & Education:  Pt tolerated sitting EOB with Poor+ sitting balance for basic self care skills. Pt completed 1 stand from bed and 3 stands from chair all with MAX A .  Pt tolerated OOB to chair x 2.5 hours this date.   Education provided re: OT POC and safety with functional mobility/ADL skills. Education provided re: OOB activity importance for improving endurance but to limit duration OOB each time to avoid over fatigue.    Education:    Patient left supine with all lines intact, call button in reach and nsg notified    GOALS:   Multidisciplinary Problems     Occupational Therapy Goals        Problem: Occupational Therapy Goal    Goal Priority Disciplines Outcome Interventions   Occupational Therapy Goal     OT, PT/OT Ongoing, Progressing    Description:  Goals to be met by: 7 days 4/3/2020     Patient will increase functional independence with ADLs by performing:    Pt to complete UE dressing with MIN A   Pt to complete LE dressing with MIN A   Pt to complete toileting with MIN A   Pt to t/f to BSC with MIN A   Pt to tolerated OOB to chair x 3-4 hours daily to increase endurance for functional activity.   Pt to demo independence with UE HEP to increase functional strength                      History:     Past Medical  History:   Diagnosis Date    Diabetes mellitus     Hypertension     IIH (idiopathic intracranial hypertension)     Seizure     remote Hx of seizure disorder        Past Surgical History:   Procedure Laterality Date    URETHRA SURGERY         Time Tracking:     OT Date of Treatment: 03/26/20  OT Start Time: 0937  OT Stop Time: 1003  OT Total Time (min): 26 min  OT returned in PM 0457-0242.    Billable Minutes:Evaluation 14  Self Care/Home Management 12  Therapeutic Exercise 10    BRAXTON Guallpa  3/26/2020

## 2020-03-26 NOTE — SUBJECTIVE & OBJECTIVE
Interval History/Significant Events:     NAEON. Weaning oxygen. Patient continues to be extremely weak. 15L and 35% fiO2. Able to respond through the window commands.     Review of Systems   Unable to perform ROS: Severe respiratory distress     Objective:     Vital Signs (Most Recent):  Temp: 98.2 °F (36.8 °C) (03/25/20 2100)  Pulse: 70 (03/26/20 0615)  Resp: 18 (03/26/20 0615)  BP: 93/62 (03/24/20 1145)  SpO2: (!) 94 % (03/26/20 0615) Vital Signs (24h Range):  Temp:  [98.2 °F (36.8 °C)-98.6 °F (37 °C)] 98.2 °F (36.8 °C)  Pulse:  [] 70  Resp:  [14-36] 18  SpO2:  [90 %-100 %] 94 %  Arterial Line BP: (100-195)/(49-88) 149/82   Weight: (!) 156.8 kg (345 lb 10.9 oz)  Body mass index is 48.21 kg/m².      Intake/Output Summary (Last 24 hours) at 3/26/2020 0752  Last data filed at 3/26/2020 0635  Gross per 24 hour   Intake 2050.58 ml   Output 2755 ml   Net -704.42 ml       Physical Exam   Constitutional: She is oriented to person, place, and time. She appears well-developed and well-nourished.   HENT:   Head: Normocephalic and atraumatic.   Eyes: Pupils are equal, round, and reactive to light. EOM are normal.   Neck: Normal range of motion. Neck supple.   Cardiovascular: Normal rate and regular rhythm.   Pulmonary/Chest: Breath sounds normal. She is in respiratory distress. She has no wheezes. She has no rales.   Abdominal: Soft. Bowel sounds are normal. She exhibits no distension. There is no tenderness.   Musculoskeletal: Normal range of motion. She exhibits no edema or deformity.   Neurological: She is alert and oriented to person, place, and time.   Skin: Skin is warm and dry. Capillary refill takes less than 2 seconds. No erythema.       Vents:  Vent Mode: Spont (03/24/20 0825)  Ventilator Initiated: Yes (03/17/20 1619)  Set Rate: 18 BPM (03/24/20 0712)  Vt Set: 450 mL (03/24/20 0712)  Pressure Support: 5 cmH20 (03/24/20 0825)  PEEP/CPAP: 5 cmH20 (03/24/20 0825)  Oxygen Concentration (%): 35 (03/26/20  0615)  Peak Airway Pressure: 10 cmH2O (03/24/20 0825)  Plateau Pressure: 26 cmH20 (03/23/20 1956)  Total Ve: 3.82 mL (03/24/20 0825)  F/VT Ratio<105 (RSBI): (!) 57.35 (03/24/20 0825)  Lines/Drains/Airways     Central Venous Catheter Line            Percutaneous Central Line Insertion/Assessment - Triple Lumen  03/17/20 1100 right internal jugular 8 days          Drain                 Urethral Catheter 03/22/20 1700 3 days          Arterial Line                 Arterial Line 03/18/20 1344 Right Radial 7 days          Peripheral Intravenous Line                 Peripheral IV - Single Lumen 03/22/20 1130 22 G Left Hand 3 days         Peripheral IV - Single Lumen 03/22/20 1236 22 G Left Forearm 3 days              Significant Labs:    CBC/Anemia Profile:  Recent Labs   Lab 03/25/20 0413 03/26/20  0517   WBC 10.77 6.03   HGB 11.3* 9.9*   HCT 38.8 34.6*    212   MCV 86 87   RDW 13.9 14.0        Chemistries:  Recent Labs   Lab 03/25/20 0413 03/25/20  2252    140   K 2.8* 3.8   CL 96 98   CO2 35* 33*   BUN 20 20   CREATININE 0.7 0.7   CALCIUM 9.2 8.6*   ALBUMIN 2.7*  --    PROT 6.7  --    BILITOT 0.7  --    ALKPHOS 63  --    ALT 31  --    AST 34  --    MG 1.7  --    PHOS 3.8  --      BMP:   Recent Labs   Lab 03/25/20 0413 03/25/20  2252   * 205*    140   K 2.8* 3.8   CL 96 98   CO2 35* 33*   BUN 20 20   CREATININE 0.7 0.7   CALCIUM 9.2 8.6*   MG 1.7  --

## 2020-03-26 NOTE — PLAN OF CARE
Problem: Physical Therapy Goal  Goal: Physical Therapy Goal  Description  Goals to be met by: 20    Patient will increase functional independence with mobility by performin. Supine to sit with MInimal Assistance -not met  2. Sit to stand transfer with Minimal Assistance -not met  3. Gait  x 100 feet with Contact Guard Assistance using AD if needed. -not met  4. Ascend/descend 8 stair with left Handrails Contact Guard Assistance - not met  5. Lower extremity exercise program x15 reps  with assistance as needed -not met     Outcome: Ongoing, Progressing   Evaluation completed and goals appropriate. Sharlene Jhaveri, PT  3/26/2020

## 2020-03-26 NOTE — PROGRESS NOTES
[facility]  Infectious Disease  Progress Note    Patient Name: Riri Gonsalves  MRN: [id number]  Admission Date: 3/17/2020  Length of Stay: 9 days  Attending Physician: Ankush Shaikh*  Primary Care Provider: Jocelynn Brooks MD    Isolation Status: Airborne and Contact and Droplet  Assessment/Plan:      * Acute respiratory failure with CoVID19 +  Pt is 29yoF w/ PMH DM2, HTN, and benign intracranial HTN transferred to [facility] on 3/17 for possible ECMOAs per chart review and info from OSH pt had rhinorrhea & dry cough for several days- went to urgent care- was discharged home on amoxicillin. A few days later she was continuing to have fevers as high as 103 & was having worsening SOB at rest- went to [facility]  3/13 with SOB, fevers, and cough.   Was on 2L oxygen. Labs on presentation were CRP 18.2, WBC 3.7, flu negative. Pt was admitted to medicine & started on empiric CAP coverage. Pt's condition continued to deteriorate- worsening leukopenia, lymphopenia, elevated LDH, & CRP, & increasing oxygen requirements. CXR showed worsening multifocal airspace disease. Pt in ARDS. Pt was transferred to MICU on 3/14. Pt decompensated & was intubated on 3/15 & on 3/16 results came back positive for Covid 19. Echo showed normal LV function (EF 60%). Pt was also started on methylprednisolone. 3/17 transferred to [facility]. ID consulted for COVID 19 management. Extubated on 3/24  Hydroxychloroquine started 3/18- course completed 3/22, off Azithro, CFTX for 3 days. Remdesivir started 3/23    On comfort flow 15L today  Recommendations  -- Continue COVID 19 isolation protocol, limit visitors  -- Continue 10 day course of Remdesivir. Started 3/23 with loading dose; continue 100mg IV daily; day 4/10 today  ID will sign off      Case discussed wit Dr Egan    Thank you for your consult. I will sign off. Please contact us if you have any additional questions.    Floyd Witt MD   ID Fellow  Infectious  Disease  [facility]    Subjective:     Principal Problem:Acute respiratory failure    HPI: History obtained from review of chart.  Pt is 30 y/o female w/ PMH DM2, HTN, and benign intracranial HTN who presented to [facility] on 3/13 complaining of shortness of breath, fevers, and cough X several days.  Prior to her presentation, she had been treated with amoxicillin with limited benefit.    She was admitted at [facility]. Pt's condition continued to deteriorate- worsening leukopenia, lymphopenia, elevated LDH, & CRP, & increasing oxygen requirements. CXR showed worsening multifocal airspace disease. Pt was transferred to MICU on 3/14. Pt decompensated &was intubated on 3/15 & on 3/16 results came back positive for Covid 19. Pt presents as transfer from [facility] ICU for possible ecmo.  Interval History: on a recliner was on 35%-15L comfort flow    Review of Systems   Unable to perform ROS: Acuity of condition     Objective:     Vital Signs (Most Recent):  Temp: 99.2 °F (37.3 °C) (03/26/20 1500)  Pulse: 91 (03/26/20 1700)  Resp: (!) 32 (03/26/20 1700)  BP: 132/64 (03/26/20 1700)  SpO2: (!) 93 % (03/26/20 1700) Vital Signs (24h Range):  Temp:  [96.2 °F (35.7 °C)-99.2 °F (37.3 °C)] 99.2 °F (37.3 °C)  Pulse:  [65-99] 91  Resp:  [14-39] 32  SpO2:  [82 %-100 %] 93 %  BP: (109-132)/(63-73) 132/64  Arterial Line BP: (106-166)/(57-88) 166/88     Weight: (!) 156.8 kg (345 lb 10.9 oz)  Body mass index is 48.21 kg/m².    Estimated Creatinine Clearance: 200.5 mL/min (based on SCr of 0.7 mg/dL).    Physical Exam   Nursing note and vitals reviewed.  Patient not examined because of CoVID19 isolation to limit exposure and use of PPE, discussed with RN      Significant Labs:   CBC:   Recent Labs   Lab 03/25/20  0413 03/26/20  0517   WBC 10.77 6.03   HGB 11.3* 9.9*   HCT 38.8 34.6*    212     CMP:   Recent Labs   Lab 03/25/20  0413 03/25/20  2252 03/26/20  0517    140 138   K 2.8* 3.8 3.3*   CL 96 98 96   CO2 35*  33* 34*   * 205* 183*   BUN 20 20 21*   CREATININE 0.7 0.7 0.7   CALCIUM 9.2 8.6* 8.6*   PROT 6.7  --  6.0   ALBUMIN 2.7*  --  2.4*   BILITOT 0.7  --  0.7   ALKPHOS 63  --  55   AST 34  --  36   ALT 31  --  30   ANIONGAP 13 9 8   EGFRNONAA >60.0 >60.0 >60.0     Microbiology Results (last 7 days)     Procedure Component Value Units Date/Time    Blood culture [[id number]] Collected:  03/19/20 2300    Order Status:  Completed Specimen:  Blood from Peripheral, Forearm, Left Updated:  03/25/20 0612     Blood Culture, Routine No growth after 5 days.    Blood culture [[id number]] Collected:  03/19/20 2300    Order Status:  Completed Specimen:  Blood from Peripheral, Hand, Left Updated:  03/25/20 0612     Blood Culture, Routine No growth after 5 days.    Culture, Respiratory with Gram Stain [[id number]] Collected:  03/20/20 0119    Order Status:  Completed Specimen:  Respiratory from Endotracheal Aspirate Updated:  03/23/20 1035     Respiratory Culture Normal respiratory prasanth      No S aureus or Pseudomonas isolated.     Gram Stain (Respiratory) <10 epithelial cells per low power field.     Gram Stain (Respiratory) Rare WBC's     Gram Stain (Respiratory) No organisms seen    Culture, Respiratory with Gram Stain [[id number]] Collected:  03/19/20 2201    Order Status:  Canceled Specimen:  Respiratory from Tracheal Aspirate         All pertinent labs within the past 24 hours have been reviewed.    Significant Imaging: I have reviewed all pertinent imaging results/findings within the past 24 hours.

## 2020-03-26 NOTE — ASSESSMENT & PLAN NOTE
ARDS 2/2 positive COVID 19 (tested at [facility])   On transfer: , ferritin 368, D-Dimer >33. CRP down trending (353 at admit, 22.4 on 3/24)   - Flu neg    - Plaquenil, azithromycin and ceftriaxone complete    - Started on remdezivir; daily labs to monitor include CBC, CMP, PT/INR, aPTT, UA ordered    - Extubated to BiPAP and well tolerated; transitioned to comfort flow on 3/25   - Precedex for agitation; will wean off as tolerated    - Continue methylprednisolone 20mg taper QD   - Goal is stepdown to floor on comfort flow    - Lasix 40mg BID.    - Continue to wean oxygen.

## 2020-03-26 NOTE — SUBJECTIVE & OBJECTIVE
Interval History: on a recliner was on 35%-15L comfort flow    Review of Systems   Unable to perform ROS: Acuity of condition     Objective:     Vital Signs (Most Recent):  Temp: 99.2 °F (37.3 °C) (03/26/20 1500)  Pulse: 91 (03/26/20 1700)  Resp: (!) 32 (03/26/20 1700)  BP: 132/64 (03/26/20 1700)  SpO2: (!) 93 % (03/26/20 1700) Vital Signs (24h Range):  Temp:  [96.2 °F (35.7 °C)-99.2 °F (37.3 °C)] 99.2 °F (37.3 °C)  Pulse:  [65-99] 91  Resp:  [14-39] 32  SpO2:  [82 %-100 %] 93 %  BP: (109-132)/(63-73) 132/64  Arterial Line BP: (106-166)/(57-88) 166/88     Weight: (!) 156.8 kg (345 lb 10.9 oz)  Body mass index is 48.21 kg/m².    Estimated Creatinine Clearance: 200.5 mL/min (based on SCr of 0.7 mg/dL).    Physical Exam   Nursing note and vitals reviewed.  Patient not examined because of CoVID19 isolation to limit exposure and use of PPE, discussed with RN      Significant Labs:   CBC:   Recent Labs   Lab 03/25/20  0413 03/26/20  0517   WBC 10.77 6.03   HGB 11.3* 9.9*   HCT 38.8 34.6*    212     CMP:   Recent Labs   Lab 03/25/20  0413 03/25/20  2252 03/26/20  0517    140 138   K 2.8* 3.8 3.3*   CL 96 98 96   CO2 35* 33* 34*   * 205* 183*   BUN 20 20 21*   CREATININE 0.7 0.7 0.7   CALCIUM 9.2 8.6* 8.6*   PROT 6.7  --  6.0   ALBUMIN 2.7*  --  2.4*   BILITOT 0.7  --  0.7   ALKPHOS 63  --  55   AST 34  --  36   ALT 31  --  30   ANIONGAP 13 9 8   EGFRNONAA >60.0 >60.0 >60.0     Microbiology Results (last 7 days)     Procedure Component Value Units Date/Time    Blood culture [[id number]] Collected:  03/19/20 2300    Order Status:  Completed Specimen:  Blood from Peripheral, Forearm, Left Updated:  03/25/20 0612     Blood Culture, Routine No growth after 5 days.    Blood culture [[id number]] Collected:  03/19/20 2300    Order Status:  Completed Specimen:  Blood from Peripheral, Hand, Left Updated:  03/25/20 0612     Blood Culture, Routine No growth after 5 days.    Culture, Respiratory with Gram Stain  [[id number]] Collected:  03/20/20 0119    Order Status:  Completed Specimen:  Respiratory from Endotracheal Aspirate Updated:  03/23/20 1035     Respiratory Culture Normal respiratory prasanth      No S aureus or Pseudomonas isolated.     Gram Stain (Respiratory) <10 epithelial cells per low power field.     Gram Stain (Respiratory) Rare WBC's     Gram Stain (Respiratory) No organisms seen    Culture, Respiratory with Gram Stain [[id number]] Collected:  03/19/20 2201    Order Status:  Canceled Specimen:  Respiratory from Tracheal Aspirate         All pertinent labs within the past 24 hours have been reviewed.    Significant Imaging: I have reviewed all pertinent imaging results/findings within the past 24 hours.

## 2020-03-26 NOTE — PT/OT/SLP EVAL
Physical Therapy Evaluation and treatment    Patient Name:  Riri Gonsalves   MRN:  [id number]    Recommendations:     Discharge Recommendations:  nursing facility, skilled   Discharge Equipment Recommendations: (will determine DME needs closer to discharge)   Barriers to discharge: Inaccessible home and Decreased caregiver support pt lives alone in 2 story with B&B upstairs.    Assessment:     Riri Gonsalves is a 29 y.o. female admitted with a medical diagnosis of Acute respiratory failure.  She presents with the following impairments/functional limitations:  weakness, gait instability, impaired endurance, impaired balance, decreased lower extremity function, impaired functional mobilty pt shannon treatment well and will benefit from skilled PT 5x/wk to progress physically. Pt may need SNF placement to maximize rehab potential.     Rehab Prognosis: Good; patient would benefit from acute skilled PT services to address these deficits and reach maximum level of function.    Recent Surgery: * No surgery found *      Plan:     During this hospitalization, patient to be seen 5 x/week to address the identified rehab impairments via gait training, therapeutic activities, therapeutic exercises and progress toward the following goals:    · Plan of Care Expires:  04/24/20    Subjective     Chief Complaint: pt had no complaints during treatment.   Patient/Family Comments/goals:  To get better and go home.   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 Pt lives in 2 story with B&B upstairs and 8 steps and L handrail to entrance.   Prior to admission, patients level of function was Independent .  Equipment used at home: none.  DME owned (not currently used): none.  Upon discharge, patient will have assistance from friend? .    Objective:     Communicated with  nurse prior to session.  Patient found supine with telemetry, arterial line, pulse ox (continuous), oxygen, richard catheter, central line(O2 comfort flow)  upon PT entry to room.    General Precautions: Standard, fall, airborne   Orthopedic Precautions:    Braces:       Exams:  · Cognitive Exam:  Patient is oriented to Person, Time and Situation  · RLE ROM: WFL  · RLE Strength: 3/5 for major muscle groups  · LLE ROM: WFL  · LLE Strength: 3/5 for major muscle groups    Functional Mobility:  · Bed Mobility:   Pt needed verbal cues for hand placement and sequencing for functional mobility.   · Rolling Left:  maximal assistance  · Supine to Sit: maximal assistance  · Sit to Supine: maximal assistance  ·   · Transfers:     · Sit to Stand:  maximal assistance with hand-held assist. Pt stood x 3 reps.  ·   · Bed to Chair: maximal assistance with  hand-held assist  using  Stand Pivot.pt sat in chair x 2 hours.       Therapeutic Activities and Exercises:   pt received verbal instructions in role of PT and POC. Pt verbalized understanding of such.     AM-PAC 6 CLICK MOBILITY  Total Score:10     Patient left supine with all lines intact and call button in reach.    GOALS:   Multidisciplinary Problems     Physical Therapy Goals        Problem: Physical Therapy Goal    Goal Priority Disciplines Outcome Goal Variances Interventions   Physical Therapy Goal     PT, PT/OT Ongoing, Progressing     Description:  Goals to be met by: 20    Patient will increase functional independence with mobility by performin. Supine to sit with MInimal Assistance -not met  2. Sit to stand transfer with Minimal Assistance -not met  3. Gait  x 100 feet with Contact Guard Assistance using AD if needed. -not met  4. Ascend/descend 8 stair with left Handrails Contact Guard Assistance - not met  5. Lower extremity exercise program x15 reps  with assistance as needed -not met                      History:     Past Medical History:   Diagnosis Date     Diabetes mellitus     Hypertension     IIH (idiopathic intracranial hypertension)     Seizure     remote Hx of seizure disorder        Past Surgical History:   Procedure Laterality Date    URETHRA SURGERY         Time Tracking:     PT Received On: 03/26/20  PT Start Time: 0937     PT Stop Time: 1003  PT Total Time (min): 26 min     2nd start time: 12:50  2nd end time: 1305  15 min (15 min + 26 min =41 min)     Billable Minutes: Evaluation 10 min and Therapeutic Activity 31 min      Sharlene Jhaveri, PT  03/26/2020

## 2020-03-26 NOTE — PLAN OF CARE
Goals and POC established this date  Problem: Occupational Therapy Goal  Goal: Occupational Therapy Goal  Description  Goals to be met by: 7 days 4/3/2020     Patient will increase functional independence with ADLs by performing:    Pt to complete UE dressing with MIN A   Pt to complete LE dressing with MIN A   Pt to complete toileting with MIN A   Pt to t/f to BSC with MIN A   Pt to tolerated OOB to chair x 3-4 hours daily to increase endurance for functional activity.   Pt to demo independence with UE HEP to increase functional strength     Outcome: Ongoing, Progressing

## 2020-03-27 LAB
ALBUMIN SERPL BCP-MCNC: 2.5 G/DL (ref 3.5–5.2)
ALP SERPL-CCNC: 57 U/L (ref 55–135)
ALT SERPL W/O P-5'-P-CCNC: 38 U/L (ref 10–44)
ANION GAP SERPL CALC-SCNC: 11 MMOL/L (ref 8–16)
APTT BLDCRRT: 27.2 SEC (ref 21–32)
AST SERPL-CCNC: 42 U/L (ref 10–40)
BASOPHILS # BLD AUTO: 0.03 K/UL (ref 0–0.2)
BASOPHILS NFR BLD: 0.4 % (ref 0–1.9)
BILIRUB SERPL-MCNC: 0.9 MG/DL (ref 0.1–1)
BUN SERPL-MCNC: 13 MG/DL (ref 6–20)
CALCIUM SERPL-MCNC: 8.7 MG/DL (ref 8.7–10.5)
CHLORIDE SERPL-SCNC: 97 MMOL/L (ref 95–110)
CO2 SERPL-SCNC: 29 MMOL/L (ref 23–29)
CREAT SERPL-MCNC: 0.7 MG/DL (ref 0.5–1.4)
CRP SERPL-MCNC: 58.3 MG/L (ref 0–8.2)
DIFFERENTIAL METHOD: ABNORMAL
EOSINOPHIL # BLD AUTO: 0 K/UL (ref 0–0.5)
EOSINOPHIL NFR BLD: 0.5 % (ref 0–8)
ERYTHROCYTE [DISTWIDTH] IN BLOOD BY AUTOMATED COUNT: 14.2 % (ref 11.5–14.5)
EST. GFR  (AFRICAN AMERICAN): >60 ML/MIN/1.73 M^2
EST. GFR  (NON AFRICAN AMERICAN): >60 ML/MIN/1.73 M^2
GLUCOSE SERPL-MCNC: 165 MG/DL (ref 70–110)
HCT VFR BLD AUTO: 35 % (ref 37–48.5)
HGB BLD-MCNC: 10.6 G/DL (ref 12–16)
IMM GRANULOCYTES # BLD AUTO: 0.06 K/UL (ref 0–0.04)
IMM GRANULOCYTES NFR BLD AUTO: 0.7 % (ref 0–0.5)
INR PPP: 1.1 (ref 0.8–1.2)
LACTATE SERPL-SCNC: 0.8 MMOL/L (ref 0.5–2.2)
LIPASE SERPL-CCNC: 291 U/L (ref 4–60)
LYMPHOCYTES # BLD AUTO: 1.8 K/UL (ref 1–4.8)
LYMPHOCYTES NFR BLD: 22 % (ref 18–48)
MAGNESIUM SERPL-MCNC: 2 MG/DL (ref 1.6–2.6)
MCH RBC QN AUTO: 26.3 PG (ref 27–31)
MCHC RBC AUTO-ENTMCNC: 30.3 G/DL (ref 32–36)
MCV RBC AUTO: 87 FL (ref 82–98)
MONOCYTES # BLD AUTO: 0.7 K/UL (ref 0.3–1)
MONOCYTES NFR BLD: 8.4 % (ref 4–15)
NEUTROPHILS # BLD AUTO: 5.5 K/UL (ref 1.8–7.7)
NEUTROPHILS NFR BLD: 68 % (ref 38–73)
NRBC BLD-RTO: 0 /100 WBC
PHOSPHATE SERPL-MCNC: 3.1 MG/DL (ref 2.7–4.5)
PLATELET # BLD AUTO: 274 K/UL (ref 150–350)
PMV BLD AUTO: 11.4 FL (ref 9.2–12.9)
POCT GLUCOSE: 187 MG/DL (ref 70–110)
POCT GLUCOSE: 264 MG/DL (ref 70–110)
POCT GLUCOSE: 264 MG/DL (ref 70–110)
POCT GLUCOSE: 294 MG/DL (ref 70–110)
POCT GLUCOSE: 330 MG/DL (ref 70–110)
POTASSIUM SERPL-SCNC: 3.3 MMOL/L (ref 3.5–5.1)
PROT SERPL-MCNC: 6.1 G/DL (ref 6–8.4)
PROTHROMBIN TIME: 11.6 SEC (ref 9–12.5)
RBC # BLD AUTO: 4.03 M/UL (ref 4–5.4)
SODIUM SERPL-SCNC: 137 MMOL/L (ref 136–145)
WBC # BLD AUTO: 8.14 K/UL (ref 3.9–12.7)

## 2020-03-27 PROCEDURE — 85730 THROMBOPLASTIN TIME PARTIAL: CPT

## 2020-03-27 PROCEDURE — 97535 SELF CARE MNGMENT TRAINING: CPT

## 2020-03-27 PROCEDURE — 99900035 HC TECH TIME PER 15 MIN (STAT)

## 2020-03-27 PROCEDURE — 85610 PROTHROMBIN TIME: CPT

## 2020-03-27 PROCEDURE — 63600175 PHARM REV CODE 636 W HCPCS: Performed by: STUDENT IN AN ORGANIZED HEALTH CARE EDUCATION/TRAINING PROGRAM

## 2020-03-27 PROCEDURE — 97110 THERAPEUTIC EXERCISES: CPT

## 2020-03-27 PROCEDURE — 27000190 HC CPAP FULL FACE MASK W/VALVE

## 2020-03-27 PROCEDURE — 27000221 HC OXYGEN, UP TO 24 HOURS

## 2020-03-27 PROCEDURE — 83735 ASSAY OF MAGNESIUM: CPT

## 2020-03-27 PROCEDURE — 83690 ASSAY OF LIPASE: CPT

## 2020-03-27 PROCEDURE — 85025 COMPLETE CBC W/AUTO DIFF WBC: CPT

## 2020-03-27 PROCEDURE — 83605 ASSAY OF LACTIC ACID: CPT

## 2020-03-27 PROCEDURE — 80053 COMPREHEN METABOLIC PANEL: CPT

## 2020-03-27 PROCEDURE — 99900026 HC AIRWAY MAINTENANCE (STAT)

## 2020-03-27 PROCEDURE — 97530 THERAPEUTIC ACTIVITIES: CPT

## 2020-03-27 PROCEDURE — 84100 ASSAY OF PHOSPHORUS: CPT

## 2020-03-27 PROCEDURE — 20600001 HC STEP DOWN PRIVATE ROOM

## 2020-03-27 PROCEDURE — 94761 N-INVAS EAR/PLS OXIMETRY MLT: CPT

## 2020-03-27 PROCEDURE — 25000003 PHARM REV CODE 250: Performed by: STUDENT IN AN ORGANIZED HEALTH CARE EDUCATION/TRAINING PROGRAM

## 2020-03-27 PROCEDURE — 36415 COLL VENOUS BLD VENIPUNCTURE: CPT

## 2020-03-27 PROCEDURE — 25000003 PHARM REV CODE 250: Performed by: INTERNAL MEDICINE

## 2020-03-27 PROCEDURE — 86140 C-REACTIVE PROTEIN: CPT

## 2020-03-27 RX ADMIN — INSULIN ASPART 2 UNITS: 100 INJECTION, SOLUTION INTRAVENOUS; SUBCUTANEOUS at 09:03

## 2020-03-27 RX ADMIN — DOCUSATE SODIUM - SENNOSIDES 1 TABLET: 50; 8.6 TABLET, FILM COATED ORAL at 08:03

## 2020-03-27 RX ADMIN — ISOSORBIDE DINITRATE 20 MG: 10 TABLET ORAL at 08:03

## 2020-03-27 RX ADMIN — INSULIN ASPART 5 UNITS: 100 INJECTION, SOLUTION INTRAVENOUS; SUBCUTANEOUS at 12:03

## 2020-03-27 RX ADMIN — INSULIN DETEMIR 20 UNITS: 100 INJECTION, SOLUTION SUBCUTANEOUS at 08:03

## 2020-03-27 RX ADMIN — HYDRALAZINE HYDROCHLORIDE 100 MG: 50 TABLET, FILM COATED ORAL at 08:03

## 2020-03-27 RX ADMIN — INSULIN ASPART 2 UNITS: 100 INJECTION, SOLUTION INTRAVENOUS; SUBCUTANEOUS at 08:03

## 2020-03-27 RX ADMIN — INSULIN ASPART 5 UNITS: 100 INJECTION, SOLUTION INTRAVENOUS; SUBCUTANEOUS at 08:03

## 2020-03-27 RX ADMIN — METHYLPREDNISOLONE SODIUM SUCCINATE 20 MG: 40 INJECTION, POWDER, FOR SOLUTION INTRAMUSCULAR; INTRAVENOUS at 08:03

## 2020-03-27 RX ADMIN — HYDRALAZINE HYDROCHLORIDE 100 MG: 50 TABLET, FILM COATED ORAL at 12:03

## 2020-03-27 RX ADMIN — ISOSORBIDE DINITRATE 20 MG: 10 TABLET ORAL at 12:03

## 2020-03-27 RX ADMIN — HYDRALAZINE HYDROCHLORIDE 100 MG: 50 TABLET, FILM COATED ORAL at 06:03

## 2020-03-27 RX ADMIN — AMLODIPINE BESYLATE 10 MG: 10 TABLET ORAL at 08:03

## 2020-03-27 RX ADMIN — ENOXAPARIN SODIUM 40 MG: 100 INJECTION SUBCUTANEOUS at 12:03

## 2020-03-27 RX ADMIN — ISOSORBIDE DINITRATE 20 MG: 10 TABLET ORAL at 06:03

## 2020-03-27 RX ADMIN — INSULIN ASPART 6 UNITS: 100 INJECTION, SOLUTION INTRAVENOUS; SUBCUTANEOUS at 06:03

## 2020-03-27 RX ADMIN — INSULIN ASPART 5 UNITS: 100 INJECTION, SOLUTION INTRAVENOUS; SUBCUTANEOUS at 06:03

## 2020-03-27 RX ADMIN — CARVEDILOL 25 MG: 25 TABLET, FILM COATED ORAL at 08:03

## 2020-03-27 RX ADMIN — POTASSIUM BICARBONATE 50 MEQ: 978 TABLET, EFFERVESCENT ORAL at 12:03

## 2020-03-27 RX ADMIN — INSULIN ASPART 8 UNITS: 100 INJECTION, SOLUTION INTRAVENOUS; SUBCUTANEOUS at 12:03

## 2020-03-27 RX ADMIN — ENOXAPARIN SODIUM 40 MG: 100 INJECTION SUBCUTANEOUS at 08:03

## 2020-03-27 RX ADMIN — CARVEDILOL 25 MG: 25 TABLET, FILM COATED ORAL at 09:03

## 2020-03-27 NOTE — SUBJECTIVE & OBJECTIVE
Interval History/Significant Events: Patient did well overnight only on Bipap for 1 hour but patient said she did not even need it. Now on comfort flow 15L at 40% fio2 with O2 sat 98%. Possibly step down to floor today.     Review of Systems   Constitutional: Negative for chills and fever.   HENT: Positive for sore throat. Negative for congestion.    Eyes: Negative for discharge and redness.   Respiratory: Positive for cough. Negative for shortness of breath and stridor.    Gastrointestinal: Negative for abdominal pain, nausea and vomiting.   Genitourinary: Negative for dysuria and hematuria.   Musculoskeletal: Negative for back pain and neck pain.   Skin: Negative for rash and wound.   Neurological: Negative for seizures and syncope.     Objective:     Vital Signs (Most Recent):  Temp: 98.1 °F (36.7 °C) (03/26/20 1900)  Pulse: 100 (03/27/20 1100)  Resp: 20 (03/27/20 1100)  BP: 131/84 (03/27/20 1000)  SpO2: (!) 94 % (03/27/20 1100) Vital Signs (24h Range):  Temp:  [98.1 °F (36.7 °C)-99.2 °F (37.3 °C)] 98.1 °F (36.7 °C)  Pulse:  [] 100  Resp:  [18-39] 20  SpO2:  [88 %-100 %] 94 %  BP: (109-144)/(59-84) 131/84  Arterial Line BP: (138-166)/(77-88) 166/88   Weight: (!) 156.8 kg (345 lb 10.9 oz)  Body mass index is 48.21 kg/m².      Intake/Output Summary (Last 24 hours) at 3/27/2020 1219  Last data filed at 3/27/2020 0830  Gross per 24 hour   Intake 25 ml   Output 1800 ml   Net -1775 ml       Physical Exam   Constitutional:   Resting in bed, in no acute respiratory distress. Smiling and waving.    HENT:   Head: Normocephalic and atraumatic.   Eyes: Pupils are equal, round, and reactive to light. EOM are normal.   Neck: Normal range of motion. Neck supple.   Cardiovascular: Normal rate.   Pulmonary/Chest:   No tachypnea, not using accessory muscles to help with breathing, O2 sat 98% on 15L with 40% fio2.    Musculoskeletal: She exhibits no edema.   Neurological: No cranial nerve deficit. She exhibits normal muscle  tone. Coordination normal.       Vents:  Vent Mode: Spont (03/24/20 0825)  Ventilator Initiated: Yes (03/17/20 1619)  Set Rate: 18 BPM (03/24/20 0712)  Vt Set: 450 mL (03/24/20 0712)  Pressure Support: 5 cmH20 (03/24/20 0825)  PEEP/CPAP: 5 cmH20 (03/24/20 0825)  Oxygen Concentration (%): 40 (03/27/20 1000)  Peak Airway Pressure: 10 cmH2O (03/24/20 0825)  Plateau Pressure: 26 cmH20 (03/23/20 1956)  Total Ve: 3.82 mL (03/24/20 0825)  F/VT Ratio<105 (RSBI): (!) 57.35 (03/24/20 0825)  Lines/Drains/Airways     Central Venous Catheter Line            Percutaneous Central Line Insertion/Assessment - Triple Lumen  03/17/20 1100 right internal jugular 10 days          Drain            Female External Urinary Catheter 03/27/20 0630 less than 1 day          Peripheral Intravenous Line                 Peripheral IV - Single Lumen 03/22/20 1236 22 G Left Forearm 4 days              Significant Labs:    CBC/Anemia Profile:  Recent Labs   Lab 03/26/20  0517 03/27/20  0626   WBC 6.03 8.14   HGB 9.9* 10.6*   HCT 34.6* 35.0*    274   MCV 87 87   RDW 14.0 14.2        Chemistries:  Recent Labs   Lab 03/25/20  2252 03/26/20  0517 03/27/20  0626    138 137   K 3.8 3.3* 3.3*   CL 98 96 97   CO2 33* 34* 29   BUN 20 21* 13   CREATININE 0.7 0.7 0.7   CALCIUM 8.6* 8.6* 8.7   ALBUMIN  --  2.4* 2.5*   PROT  --  6.0 6.1   BILITOT  --  0.7 0.9   ALKPHOS  --  55 57   ALT  --  30 38   AST  --  36 42*   MG  --  2.2 2.0   PHOS  --  4.4 3.1       Lipid Panel: No results for input(s): CHOL, HDL, LDLCALC, TRIG, CHOLHDL in the last 48 hours.    Significant Imaging:  I have reviewed all pertinent imaging results/findings within the past 24 hours.  I have reviewed and interpreted all pertinent imaging results/findings within the past 24 hours.

## 2020-03-27 NOTE — ASSESSMENT & PLAN NOTE
- Likely due to diuresis with lasix, lasix discontinued   - Hypo-K at 3.3, given PO potassium today   - Continue monitoring electrolytes with AM labs

## 2020-03-27 NOTE — ASSESSMENT & PLAN NOTE
- TGs 800s last week at 570s few days ago 2/2 propofol use (off for over 4d).    - Lipase trending down 3/27: 261   - Continue to trend Lipase   - No clinical signs of pancreatitis; will consider CT A/P to evaluate for pancreatitis if needed

## 2020-03-27 NOTE — ASSESSMENT & PLAN NOTE
- 25mg BID, Q6h hydralazine 100mg scheduled on 3/21, amlodipine 10 mg qd, Isosorbide dinitrate 20 TID   - Hydralazine PRN 10mg IV SBP >180

## 2020-03-27 NOTE — PROGRESS NOTES
[facility] Medicine ICU Acceptance Note    Date of Admit: 3/17/2020  Date of Transfer / Stepdown: 3/27/2020  Deepak, C/J, L, Onc (IV chemo w/in 1 month), Gyn/Onc, or other special case?: no  ICU team stepping patient down:MICU   ICU team member giving verbal handoff: Brenda Mukherjee MD  Accepting  team: [facility]    Brief History of Present Illness:      Riri Gonsalves is a 29yoF w/ PMH DM2, HTN, and benign intracranial HTN who presented to [facility] on 3/13 complaining of shortness of breath, fevers, and cough. 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 A few days later she was continuing to have fevers as high as 103 & was having worsening SOB at rest- went to [facility] ED & was initially feeling better on 2L NC. Labs on presentation were CRP 18.2, WBC 3.7, flu negative. Pt was admitted to medicine & started on empiric CAP coverage. Pt's condition continued to deteriorate- worsening leukopenia, lymphopenia, elevated LDH, & CRP, & increasing oxygen requirements. CXR showed worsening multifocal airspace disease. Pt was transferred to MICU on 3/14. Pt decompensated & was intubated on 3/15 & on 3/16 results came back positive for Covid 19. Echo showed normal LV function (EF 60%). Pt was proned on 3/16. Pt was supinated prior to transfer. Pt presents as transfer from [facility] ICU for possible ecmo.     Hospital/ICU Course:     Patient was transferred intubated and sedated from [facility] ICU for possible ECMO given hypoxic respiratory failure due to ARDS 2/2 COVID-19 and was proned on 3/17. Cardine started for HTN and insulin drip for hyperglycemia (Hx uncontrolled T2DM). On 3/18 she was supinated with improvement of vent settings and oxygenation. Started on Remdesivir 200mg on 3/23, after receiving Plaquenil and azithromycin. Patient  successfully extubated to BiPAP 55% Fio2, 15/5  on 3/24 and transitioned to comfort flow on 3/25 and has tolerated well.       Acute respiratory failure with CoVID19 +  ARDS 2/2 positive COVID 19 (tested at [facility])   On transfer: , ferritin 368, D-Dimer >33. CRP down trending (353 at admit, 22.4 on 3/24)          - Flu neg           - Plaquenil, azithromycin and ceftriaxone complete           - Started on remdezivir; daily labs to monitor include CBC, CMP, PT/INR, aPTT, UA ordered           - Extubated to BiPAP and well tolerated; transitioned to comfort flow on 3/25          - Methylprednisolone taper completed                     - discontinued Lasix           - Continue to wean oxygen and monitor respiratory status, transition to bubble flow           - Step down to floor today     Essential hypertension          - 25mg BID, Q6h hydralazine 100mg scheduled on 3/21, amlodipine 10 mg qd, Isosorbide dinitrate 20 TID          - Hydralazine PRN 10mg IV SBP >180      Hypokalemia          - Likely due to diuresis with lasix, lasix discontinued          - Hypo-K at 3.3, given PO potassium today          - Continue monitoring electrolytes with AM labs       Type 2 diabetes mellitus without complication, without long-term current use of insulin  Insulin dependent type 2 DM          - SQ 20U detemir BID, Aspart TID.           - SSI           - POCT Glucose     Elevated lipase          - TGs 800s last week at 570s few days ago 2/2 propofol use (off for over 4d).           - Lipase trending down 3/27: 261          - Continue to trend Lipase          - No clinical signs of pancreatitis; will consider CT A/P to evaluate for pancreatitis if needed       Consultants and Procedures:     Consultants:  Infectious Disease  Wound Care  Nutrition     Procedures:    None    Transfer Information:     Diet:  Diabetic     Physical Activity:  PT/OT recommend SNF    To Do / Pending Studies / Follow ups:    - wean oxygen   - continue  remdesivir x9 day course (end- 4/1)  - continue to titrate antihypertensives   - continue insulin regimen   - replete lytes prn  - trend lipase and triglycerides   - follow up with case management regarding SNF placement     Patient has been accepted by Hospital Medicine Team IMG, who will assume care of the patient upon arrival to the floor from the ICU. Please contact ICU team with any concerns prior to arrival. Please contact Hospital Medicine at [phone] or [phone] (please do NOT leave a voicemail) when patient arrives to the floor.    Aleksandr Dumont MD  Hospital Medicine Staff  Pager: [phone]; Spectra: [phone]

## 2020-03-27 NOTE — PLAN OF CARE
03/27/20 1436   Post-Acute Status   Post-Acute Authorization Placement   Post-Acute Placement Status Referrals Sent   Patient choice form signed by patient/caregiver List from System Post-Acute Care   Discharge Delays None known at this time   Discharge Plan   Discharge Plan A Skilled Nursing Facility   Discharge Plan B Home Health

## 2020-03-27 NOTE — PLAN OF CARE
Goals and POC remain appropriate.   Problem: Occupational Therapy Goal  Goal: Occupational Therapy Goal  Description  Goals to be met by: 7 days 4/3/2020     Patient will increase functional independence with ADLs by performing:    Pt to complete UE dressing with MIN A   Pt to complete LE dressing with MIN A   Pt to complete toileting with MIN A   Pt to t/f to BSC with MIN A   Pt to tolerated OOB to chair x 3-4 hours daily to increase endurance for functional activity.   Pt to demo independence with UE HEP to increase functional strength     Outcome: Ongoing, Progressing

## 2020-03-27 NOTE — ASSESSMENT & PLAN NOTE
ARDS 2/2 positive COVID 19 (tested at [facility])   On transfer: , ferritin 368, D-Dimer >33. CRP down trending (353 at admit, 22.4 on 3/24)   - Flu neg    - Plaquenil, azithromycin and ceftriaxone complete    - Started on remdezivir; daily labs to monitor include CBC, CMP, PT/INR, aPTT, UA ordered    - Extubated to BiPAP and well tolerated; transitioned to comfort flow on 3/25   - Methylprednisolone taper completed     - Lasix 40mg daily    - Continue to wean oxygen and monitor respiratory status, transition to bubble flow    - Step down to floor today

## 2020-03-27 NOTE — PLAN OF CARE
Problem: Physical Therapy Goal  Goal: Physical Therapy Goal  Description  Goals to be met by: 20    Patient will increase functional independence with mobility by performin. Supine to sit with MInimal Assistance -not met  2. Sit to stand transfer with Minimal Assistance -not met  3. Gait  x 100 feet with Contact Guard Assistance using AD if needed. -not met  4. Ascend/descend 8 stair with left Handrails Contact Guard Assistance - not met  5. Lower extremity exercise program x15 reps  with assistance as needed -not met     Outcome: Ongoing, Progressing   Goals remain appropriate. Sharlene Jhaveri, PT  3/27/2020

## 2020-03-27 NOTE — PROGRESS NOTES
"  [facility]  Adult Nutrition  Consult Note    SUMMARY     Recommendations    1. Continue current Diabetic diet. Continue to encourage adequate PO intake as tolerated.   2. RD to monitor & follow-up.    Goals: Meet % EEN, EPN by RD f/u date  Nutrition Goal Status: progressing towards goal  Communication of RD Recs: reviewed with RN    Reason for Assessment    Reason For Assessment: RD follow-up  Diagnosis: other (see comments)(Resp. fx)  Relevant Medical History: HTN, DM  Interdisciplinary Rounds: attended    General Information Comments: Pt extubated 3/24, diet advanced. Per RN documentation, pt tolerating diet w/ 25% PO intake. RD to attach diabetic diet information to pt's discharge paperwork (A1C 12.4). Remote access 2/2 to pt positive for COVID-19. No wt history found in chart review, unsure of PO intake PTA. Due to contact/airborne precautions, NFPE unable to be assessed at this time. Will monitor.   Nutrition Discharge Planning: Adequate PO intake    Nutrition/Diet History    Spiritual, Cultural Beliefs, Orthodox Practices, Values that Affect Care: no  Factors Affecting Nutritional Intake: decreased appetite    Anthropometrics    Temp: 98.1 °F (36.7 °C)  Height Method: (obtained from previous record)  Height: 5' 11" (180.3 cm)  Height (inches): 71 in  Weight Method: Bed Scale  Weight: (!) 156.8 kg (345 lb 10.9 oz)  Weight (lb): (!) 345.68 lb  Ideal Body Weight (IBW), Female: 155 lb  % Ideal Body Weight, Female (lb): 223.02 %  BMI (Calculated): 48.2  BMI Grade: greater than 40 - morbid obesity    Lab/Procedures/Meds    Pertinent Labs Reviewed: reviewed  Pertinent Labs Comments: A1C 12.4  Pertinent Medications Reviewed: reviewed  Pertinent Medications Comments: -    Estimated/Assessed Needs    Weight Used For Calorie Calculations: (!) 136.2 kg (300 lb 4.3 oz)(Admit wt)     Energy Calorie Requirements (kcal): 2193 kcal/d  Energy Need Method: Haworth-St Jeor(1.0 PAL)     Protein " Requirements: 136 g/d (1 g/kg)  Weight Used For Protein Calculations: (!) 136.2 kg (300 lb 4.3 oz)     Estimated Fluid Requirement Method: other (see comments)(Per MD or 1 mL/kcal)     CHO Requirement: 50% total kcals    Nutrition Prescription Ordered    Current Diet Order: 2000 kcal ADA  Current Nutrition Support Formula Ordered: Other (Comment)(Discontinued)    Evaluation of Received Nutrient/Fluid Intake    Comments: LBM: 3/15    Tolerance: tolerating    Nutrition Risk    Level of Risk/Frequency of Follow-up: (1x/week)     Assessment and Plan    Nutrition Problem  Inadequate energy intake     Related to (etiology):   Inability to consume sufficient energy     Signs and Symptoms (as evidenced by):   NPO     Interventions(treatment strategy):  Collaboration of nutrition care w/ other providers      Nutrition Diagnosis Status:   Resolved      Monitor and Evaluation    Food and Nutrient Intake: energy intake, food and beverage intake, enteral nutrition intake  Food and Nutrient Adminstration: diet order, enteral and parenteral nutrition administration  Physical Activity and Function: nutrition-related ADLs and IADLs  Anthropometric Measurements: weight, weight change  Biochemical Data, Medical Tests and Procedures: inflammatory profile, lipid profile, glucose/endocrine profile, gastrointestinal profile, electrolyte and renal panel  Nutrition-Focused Physical Findings: overall appearance     Nutrition Follow-Up    RD Follow-up?: Yes

## 2020-03-27 NOTE — PT/OT/SLP PROGRESS
Occupational Therapy   Treatment    Name: Riri Gonsalves  MRN: [id number]  Admitting Diagnosis:  Acute respiratory failure       Recommendations:     Discharge Recommendations: nursing facility, skilled      Assessment:     Riri Gonsalves is a 29 y.o. female with a medical diagnosis of Acute respiratory failure.Performance deficits affecting function are weakness, impaired functional mobilty, gait instability, impaired self care skills, impaired endurance. Pt tolerated session well with good effort and performance. Pt remains appropriate for post acute therapy prior to return home to increase functional independence.     Rehab Prognosis:  Good; patient would benefit from acute skilled OT services to address these deficits and reach maximum level of function.       Plan:     Patient to be seen 5 x/week to address the above listed problems via self-care/home management, therapeutic exercises, therapeutic activities  · Plan of Care Expires:    · Plan of Care Reviewed with: patient    Subjective     Pain/Comfort:  · Pain Rating 1: 0/10    Objective:     Communicated with: nsg prior to session. \Pt found supine in bed with comfort flow in place upon arrival. Upon return to room, pt on 6 LPM oxygen. Saturations stable at 93%.     General Precautions: Standard, fall, droplet, airborne(COVID-19+)   Occupational Performance:     Bed Mobility:    Supine>sit with MOD A     Functional Mobility/Transfers:  · Sit>stand with MIN A from bed  · Sit>stand from chair with MAX A after OOB x 2 hours  · Stand pivot with MOD A bed<<>chair.  · Second person present for safety and unpredictable knee buckling.     Activities of Daily Living:  · Feeding: set-up   · G/H: initiated task in stand with MIN A for balance, but completed task in sitting due to impaired functional balance and endurance.  · LE dressing: TOTAL A  · Toileting: TOTAL A       AMPAC 6 Click ADL: 10    Treatment & Education:  Pt completed sitting EOB x 10 min with Fair to  Fair+ sitting balance. Pt engaged with self care in both sitting and standing. Pt OOB to chair x 2 hours this date. Pt returned to bed with increased fatigue noted via increased assist for standing.  Education provided and pt completed UE AROM exs to be performed 3 x daily as part of HEP. Nsg education re: techniques for assising with UE HEP to increase functional ROM and strength.  Education provided re: OOB to chair approx 2-3 hour each day this weekend with nsg staff.  Education provided re: OT POC and safety with functional mobility/ADL skills.     Patient left supine with all lines intact, call button in reach and nsg presentEducation:      GOALS:   Multidisciplinary Problems     Occupational Therapy Goals        Problem: Occupational Therapy Goal    Goal Priority Disciplines Outcome Interventions   Occupational Therapy Goal     OT, PT/OT Ongoing, Progressing    Description:  Goals to be met by: 7 days 4/3/2020     Patient will increase functional independence with ADLs by performing:    Pt to complete UE dressing with MIN A   Pt to complete LE dressing with MIN A   Pt to complete toileting with MIN A   Pt to t/f to BSC with MIN A   Pt to tolerated OOB to chair x 3-4 hours daily to increase endurance for functional activity.   Pt to demo independence with UE HEP to increase functional strength                      Time Tracking:     OT Date of Treatment: 03/27/20  OT Start Time: 1010  OT Stop Time: 1036  OT Total Time (min): 26 min  Additional time 12:05 -12:20  Billable Minutes:Self Care/Home Management 16  Therapeutic Exercise 15   Additional charge Therapeutic Activity     BRAXTON Guallpa  3/27/2020

## 2020-03-27 NOTE — RESIDENT HANDOFF
Handoff     Primary Team: Networked reference to record PCT  Room Number: 6065/6065 A     Patient Name: Riri Gonsalves MRN: [id number]     Date of Birth: [id number] Allergies: Zofran [ondansetron hcl (pf)]     Age: 29 y.o. Admit Date: 3/17/2020     Sex: female  BMI: Body mass index is 48.21 kg/m².     Code Status: Full Code        Illness Level (current clinical status): Watcher - Yes - patient COVID positive, on the ventilator for extended period of time, watch for decompensation    Reason for Admission: Acute respiratory failure    Brief HPI (pertinent PMH and diagnosis or differential diagnosis): Pt is 29yoF w/ PMH DM2, HTN, and benign intracranial HTN who presented to [facility] on 3/13 complaining of shortness of breath, fevers, and cough. 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 A few days later she was continuing to have fevers as high as 103 & was having worsening SOB at rest- went to [facility] ED & was initially feeling better on 2L NC. Labs on presentation were CRP 18.2, WBC 3.7, flu negative. Pt was admitted to medicine & started on empiric CAP coverage. Pt's condition continued to deteriorate- worsening leukopenia, lymphopenia, elevated LDH, & CRP, & increasing oxygen requirements. CXR showed worsening multifocal airspace disease. Pt was transferred to MICU on 3/14. Pt decompensated & was intubated on 3/15 & on 3/16 results came back positive for Covid 19. Echo showed normal LV function (EF 60%). Pt was proned on 3/16. Pt was supinated prior to transfer. Pt presents as transfer from [facility] ICU for possible ecmo.     Hospital Course (updated, brief assessment by system or problem, significant events): Patient was transferred intubated and sedated from [facility] ICU for possible ECMO given hypoxic respiratory failure due to ARDS 2/2  COVID-19 and was proned on 3/17. Cardine started for HTN and insulin drip for hyperglycemia (Hx uncontrolled T2DM). On 3/18 she was supinated with improvement of vent settings and oxygenation. Started on Remdesivir 200mg on 3/23, after receiving Plaquenil and azithromycin. Patient successfully extubated to BiPAP 55% Fio2, 15/5  on 3/24 and transitioned to comfort flow on 3/25 and has tolerated well. Patient did well overnight only on Bipap for 1 hour but patient said she did not even need it. Now on comfort flow 15L at 40% fio2 with O2 sat 98%. Possibly step down to floor today.     Tasks (specific, using if-then statements):   Continue oxygen based on patients needs  If desats, please re-consult us sooner rather than later (lower threshold for evaluation and re-intubation)  If stable, discharge home without issue    Estimated Discharge Date: From ICU 3/27    Discharge Disposition: Home or Self Care    Mentored By: Critical Care Team 1

## 2020-03-27 NOTE — PLAN OF CARE
Patient medically stable for stepdown at this time.     CM remains available for any further patient/family needs or concerns.        03/27/20 1452   Discharge Reassessment   Assessment Type Discharge Planning Reassessment   Do you have any problems affording any of your prescribed medications? No   Discharge Plan A Skilled Nursing Facility   Discharge Plan B Home Health   DME Needed Upon Discharge  other (see comments)  (TBD)   Anticipated Discharge Disposition SNF   Can the patient/caregiver answer the patient profile reliably? Yes, cognitively intact       Joaquín Justice RN MSN  Critical Care-   Ext. [phone]

## 2020-03-28 LAB
ALBUMIN SERPL BCP-MCNC: 2.7 G/DL (ref 3.5–5.2)
ALP SERPL-CCNC: 60 U/L (ref 55–135)
ALT SERPL W/O P-5'-P-CCNC: 33 U/L (ref 10–44)
ANION GAP SERPL CALC-SCNC: 9 MMOL/L (ref 8–16)
APTT BLDCRRT: 25.9 SEC (ref 21–32)
AST SERPL-CCNC: 26 U/L (ref 10–40)
BASOPHILS # BLD AUTO: 0.03 K/UL (ref 0–0.2)
BASOPHILS NFR BLD: 0.3 % (ref 0–1.9)
BILIRUB SERPL-MCNC: 0.6 MG/DL (ref 0.1–1)
BUN SERPL-MCNC: 11 MG/DL (ref 6–20)
CALCIUM SERPL-MCNC: 8.7 MG/DL (ref 8.7–10.5)
CHLORIDE SERPL-SCNC: 98 MMOL/L (ref 95–110)
CO2 SERPL-SCNC: 31 MMOL/L (ref 23–29)
CREAT SERPL-MCNC: 0.8 MG/DL (ref 0.5–1.4)
CRP SERPL-MCNC: 64 MG/L (ref 0–8.2)
DIFFERENTIAL METHOD: ABNORMAL
EOSINOPHIL # BLD AUTO: 0 K/UL (ref 0–0.5)
EOSINOPHIL NFR BLD: 0.5 % (ref 0–8)
ERYTHROCYTE [DISTWIDTH] IN BLOOD BY AUTOMATED COUNT: 14.1 % (ref 11.5–14.5)
EST. GFR  (AFRICAN AMERICAN): >60 ML/MIN/1.73 M^2
EST. GFR  (NON AFRICAN AMERICAN): >60 ML/MIN/1.73 M^2
GLUCOSE SERPL-MCNC: 206 MG/DL (ref 70–110)
HCT VFR BLD AUTO: 35.2 % (ref 37–48.5)
HGB BLD-MCNC: 10.4 G/DL (ref 12–16)
IMM GRANULOCYTES # BLD AUTO: 0.08 K/UL (ref 0–0.04)
IMM GRANULOCYTES NFR BLD AUTO: 0.9 % (ref 0–0.5)
INR PPP: 1.1 (ref 0.8–1.2)
LACTATE SERPL-SCNC: 1 MMOL/L (ref 0.5–2.2)
LYMPHOCYTES # BLD AUTO: 2.4 K/UL (ref 1–4.8)
LYMPHOCYTES NFR BLD: 27.2 % (ref 18–48)
MAGNESIUM SERPL-MCNC: 2.1 MG/DL (ref 1.6–2.6)
MCH RBC QN AUTO: 25.7 PG (ref 27–31)
MCHC RBC AUTO-ENTMCNC: 29.5 G/DL (ref 32–36)
MCV RBC AUTO: 87 FL (ref 82–98)
MONOCYTES # BLD AUTO: 0.8 K/UL (ref 0.3–1)
MONOCYTES NFR BLD: 9 % (ref 4–15)
NEUTROPHILS # BLD AUTO: 5.4 K/UL (ref 1.8–7.7)
NEUTROPHILS NFR BLD: 62.1 % (ref 38–73)
NRBC BLD-RTO: 0 /100 WBC
PHOSPHATE SERPL-MCNC: 3.4 MG/DL (ref 2.7–4.5)
PLATELET # BLD AUTO: 299 K/UL (ref 150–350)
PMV BLD AUTO: 11.6 FL (ref 9.2–12.9)
POCT GLUCOSE: 237 MG/DL (ref 70–110)
POCT GLUCOSE: 238 MG/DL (ref 70–110)
POCT GLUCOSE: 301 MG/DL (ref 70–110)
POCT GLUCOSE: 312 MG/DL (ref 70–110)
POTASSIUM SERPL-SCNC: 3.3 MMOL/L (ref 3.5–5.1)
PROT SERPL-MCNC: 6.5 G/DL (ref 6–8.4)
PROTHROMBIN TIME: 11 SEC (ref 9–12.5)
RBC # BLD AUTO: 4.05 M/UL (ref 4–5.4)
SODIUM SERPL-SCNC: 138 MMOL/L (ref 136–145)
WBC # BLD AUTO: 8.65 K/UL (ref 3.9–12.7)

## 2020-03-28 PROCEDURE — 27000221 HC OXYGEN, UP TO 24 HOURS

## 2020-03-28 PROCEDURE — 83735 ASSAY OF MAGNESIUM: CPT

## 2020-03-28 PROCEDURE — 86140 C-REACTIVE PROTEIN: CPT

## 2020-03-28 PROCEDURE — 63600175 PHARM REV CODE 636 W HCPCS: Performed by: STUDENT IN AN ORGANIZED HEALTH CARE EDUCATION/TRAINING PROGRAM

## 2020-03-28 PROCEDURE — 20600001 HC STEP DOWN PRIVATE ROOM

## 2020-03-28 PROCEDURE — 85730 THROMBOPLASTIN TIME PARTIAL: CPT

## 2020-03-28 PROCEDURE — 25000003 PHARM REV CODE 250: Performed by: INTERNAL MEDICINE

## 2020-03-28 PROCEDURE — 85025 COMPLETE CBC W/AUTO DIFF WBC: CPT

## 2020-03-28 PROCEDURE — 83605 ASSAY OF LACTIC ACID: CPT

## 2020-03-28 PROCEDURE — C9399 UNCLASSIFIED DRUGS OR BIOLOG: HCPCS | Performed by: STUDENT IN AN ORGANIZED HEALTH CARE EDUCATION/TRAINING PROGRAM

## 2020-03-28 PROCEDURE — 99233 SBSQ HOSP IP/OBS HIGH 50: CPT | Mod: ,,, | Performed by: INTERNAL MEDICINE

## 2020-03-28 PROCEDURE — 84100 ASSAY OF PHOSPHORUS: CPT

## 2020-03-28 PROCEDURE — 80053 COMPREHEN METABOLIC PANEL: CPT

## 2020-03-28 PROCEDURE — 36415 COLL VENOUS BLD VENIPUNCTURE: CPT

## 2020-03-28 PROCEDURE — 85610 PROTHROMBIN TIME: CPT

## 2020-03-28 PROCEDURE — 99233 PR SUBSEQUENT HOSPITAL CARE,LEVL III: ICD-10-PCS | Mod: ,,, | Performed by: INTERNAL MEDICINE

## 2020-03-28 PROCEDURE — 99900035 HC TECH TIME PER 15 MIN (STAT)

## 2020-03-28 RX ADMIN — AMLODIPINE BESYLATE 10 MG: 10 TABLET ORAL at 08:03

## 2020-03-28 RX ADMIN — INSULIN DETEMIR 20 UNITS: 100 INJECTION, SOLUTION SUBCUTANEOUS at 08:03

## 2020-03-28 RX ADMIN — ISOSORBIDE DINITRATE 20 MG: 10 TABLET ORAL at 04:03

## 2020-03-28 RX ADMIN — INSULIN ASPART 8 UNITS: 100 INJECTION, SOLUTION INTRAVENOUS; SUBCUTANEOUS at 12:03

## 2020-03-28 RX ADMIN — CARVEDILOL 25 MG: 25 TABLET, FILM COATED ORAL at 09:03

## 2020-03-28 RX ADMIN — HYDRALAZINE HYDROCHLORIDE 100 MG: 50 TABLET, FILM COATED ORAL at 09:03

## 2020-03-28 RX ADMIN — INSULIN ASPART 5 UNITS: 100 INJECTION, SOLUTION INTRAVENOUS; SUBCUTANEOUS at 04:03

## 2020-03-28 RX ADMIN — ENOXAPARIN SODIUM 40 MG: 100 INJECTION SUBCUTANEOUS at 08:03

## 2020-03-28 RX ADMIN — ISOSORBIDE DINITRATE 20 MG: 10 TABLET ORAL at 09:03

## 2020-03-28 RX ADMIN — INSULIN ASPART 5 UNITS: 100 INJECTION, SOLUTION INTRAVENOUS; SUBCUTANEOUS at 12:03

## 2020-03-28 RX ADMIN — ENOXAPARIN SODIUM 40 MG: 100 INJECTION SUBCUTANEOUS at 09:03

## 2020-03-28 RX ADMIN — HYDRALAZINE HYDROCHLORIDE 100 MG: 50 TABLET, FILM COATED ORAL at 04:03

## 2020-03-28 RX ADMIN — CARVEDILOL 25 MG: 25 TABLET, FILM COATED ORAL at 08:03

## 2020-03-28 RX ADMIN — INSULIN DETEMIR 20 UNITS: 100 INJECTION, SOLUTION SUBCUTANEOUS at 09:03

## 2020-03-28 RX ADMIN — INSULIN ASPART 5 UNITS: 100 INJECTION, SOLUTION INTRAVENOUS; SUBCUTANEOUS at 08:03

## 2020-03-28 RX ADMIN — ISOSORBIDE DINITRATE 20 MG: 10 TABLET ORAL at 02:03

## 2020-03-28 RX ADMIN — INSULIN ASPART 4 UNITS: 100 INJECTION, SOLUTION INTRAVENOUS; SUBCUTANEOUS at 04:03

## 2020-03-28 RX ADMIN — HYDRALAZINE HYDROCHLORIDE 100 MG: 50 TABLET, FILM COATED ORAL at 02:03

## 2020-03-28 NOTE — PROGRESS NOTES
Hospital Medicine  Progress Note  [facility]      Patient Name: Riri Gonsalves  MRN:  [id number]  [facility] Medicine Team: [facility] Aleksandr Dumont MD  Date of Admission:  3/17/2020     Length of Stay:  LOS: 11 days       Principal Problem:  Acute respiratory failure      HPI:    Riri Gonsalves is a 29yoF w/ PMH DM2, HTN, and benign intracranial HTN who presented to [facility] on 3/13 complaining of shortness of breath, fevers, and cough. 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 A few days later she was continuing to have fevers as high as 103 & was having worsening SOB at rest- went to [facility] ED & was initially feeling better on 2L NC. Labs on presentation were CRP 18.2, WBC 3.7, flu negative. Pt was admitted to medicine & started on empiric CAP coverage. Pt's condition continued to deteriorate- worsening leukopenia, lymphopenia, elevated LDH, & CRP, & increasing oxygen requirements. CXR showed worsening multifocal airspace disease. Pt was transferred to MICU on 3/14. Pt decompensated & was intubated on 3/15 & on 3/16 results came back positive for Covid 19. Echo showed normal LV function (EF 60%). Pt was proned on 3/16. Pt was supinated prior to transfer. Pt presents as transfer from [facility] ICU for possible ecmo.        Hospital Course:  Patient admitted for evaluation of possible COVID-19.  Patient was transferred intubated and sedated from [facility] ICU for possible ECMO given hypoxic respiratory failure due to ARDS 2/2 COVID-19 and was proned on 3/17. Cardine started for HTN and insulin drip for hyperglycemia (Hx uncontrolled T2DM). On 3/18 she was supinated with improvement of vent settings and oxygenation. Started on Remdesivir 200mg on 3/23, after receiving Plaquenil and azithromycin. Patient  successfully extubated to BiPAP 55% Fio2, 15/5  on 3/24 and transitioned to comfort flow on 3/25 and has tolerated well.     Interval History:     Patient lying in bed, no acute distress. She     Review of Systems:  Respiratory:  Cardiovascular:  GI:    Inpatient Medications:    Current Facility-Administered Medications:     acetaminophen oral solution 999.0148 mg, 999.0148 mg, Oral, Q6H PRN, Brenda Mukherjee MD, 999.0148 mg at 03/19/20 1001    amLODIPine tablet 10 mg, 10 mg, Oral, Daily, Ankush Shaikh MD, 10 mg at 03/28/20 0832    carvediloL tablet 25 mg, 25 mg, Oral, BID, Ankush Shaikh MD, 25 mg at 03/28/20 0832    dextrose 10% (D10W) Bolus, 12.5 g, Intravenous, PRN, Sara Justice MD    dextrose 10% (D10W) Bolus, 25 g, Intravenous, PRN, Sara Justice MD    enoxaparin injection 40 mg, 40 mg, Subcutaneous, Q12H, Behram Khan, MD, 40 mg at 03/28/20 0831    glucagon (human recombinant) injection 1 mg, 1 mg, Intramuscular, PRN, Sara Justice MD    glucose chewable tablet 16 g, 16 g, Oral, PRN, Sara Justice MD    glucose chewable tablet 24 g, 24 g, Oral, PRN, Sara Justice MD    hydrALAZINE injection 10 mg, 10 mg, Intravenous, Q4H PRN, Teodoro Comer MD, 10 mg at 03/25/20 1046    hydrALAZINE tablet 100 mg, 100 mg, Oral, Q8H, Ankush Shaikh MD, 100 mg at 03/28/20 1434    insulin aspart U-100 pen 1-10 Units, 1-10 Units, Subcutaneous, QID (AC + HS) PRN, Sara Justice MD, 4 Units at 03/28/20 1658    insulin aspart U-100 pen 5 Units, 5 Units, Subcutaneous, TIDWM, Sara Justice MD, 5 Units at 03/28/20 1657    insulin detemir U-100 pen 20 Units, 20 Units, Subcutaneous, BID, Sara Justice MD, 20 Units at 03/28/20 0835    [COMPLETED] INV remdesivir 200 mg in INV sodium chloride 0.9 % 250 mL infusion, 200 mg, Intravenous, Q24H, Last Rate: 500 mL/hr at 03/23/20 2019, 200 mg at 03/23/20 2019 **FOLLOWED BY** INV remdesivir 100 mg in  "INV sodium chloride 0.9 % 250 mL infusion, 100 mg, Intravenous, Daily, Arnoldo Hernandez MD, Last Rate: 500 mL/hr at 03/28/20 1805, 100 mg at 03/28/20 1805    isosorbide dinitrate tablet 20 mg, 20 mg, Oral, TID, Ankush Shaikh MD, 20 mg at 03/28/20 1434    senna-docusate 8.6-50 mg per tablet 1 tablet, 1 tablet, Oral, Daily, Ankush Shaikh MD, 1 tablet at 03/27/20 0838    sodium chloride 0.9% flush 10 mL, 10 mL, Intravenous, PRN, Hector Amanda MD      Physical Exam:    No intake or output data in the 24 hours ending 03/28/20 1847  Wt Readings from Last 3 Encounters:   03/27/20 (!) 156.8 kg (345 lb 10.9 oz)   03/17/20 (!) 150 kg (330 lb 11 oz)   10/14/18 (!) 158.3 kg (349 lb)       BP (!) 132/58 (BP Location: Left arm, Patient Position: Sitting)   Pulse 102   Temp 98.9 °F (37.2 °C) (Oral)   Resp 18   Ht 5' 11" (1.803 m)   Wt (!) 156.8 kg (345 lb 10.9 oz)   SpO2 (!) 92%   Breastfeeding? No   BMI 48.21 kg/m²     GEN: NAD, conversant  Resp: coarse bilateral breath sounds, no wheezes or rales, normal work of breathing   CV: RRR, no m/r/g, no edema  GI: soft, NTND  Skin: no rash    Laboratory:  No results found for: [id number]    Recent Labs   Lab 03/26/20  0517 03/27/20  0626 03/28/20  0500   WBC 6.03 8.14 8.65   LYMPH 30.3  1.8 22.0  1.8 27.2  2.4   HGB 9.9* 10.6* 10.4*   HCT 34.6* 35.0* 35.2*    274 299     Recent Labs   Lab 03/24/20  0435 03/25/20  0413  03/26/20  0517 03/27/20  0626 03/28/20  0500    144   < > 138 137 138   K 3.1* 2.8*   < > 3.3* 3.3* 3.3*   CL 99 96   < > 96 97 98   CO2 32* 35*   < > 34* 29 31*   BUN 19 20   < > 21* 13 11   CREATININE 0.7 0.7   < > 0.7 0.7 0.8   * 166*   < > 183* 165* 206*   CALCIUM 8.9 9.2   < > 8.6* 8.7 8.7   MG 1.6 1.7  --  2.2 2.0 2.1   PHOS 2.9 3.8  --  4.4 3.1 3.4   LIPASE 210* 359*  --   --  291*  --     < > = values in this interval not displayed.     Recent Labs   Lab 03/26/20  0517 03/27/20  0626 " 03/28/20  0500   ALKPHOS 55 57 60   ALT 30 38 33   AST 36 42* 26   ALBUMIN 2.4* 2.5* 2.7*   PROT 6.0 6.1 6.5   BILITOT 0.7 0.9 0.6   INR 1.1 1.1 1.1        Recent Labs     03/26/20  0517 03/27/20  0626 03/28/20  0500   CRP 65.9* 58.3* 64.0*       All labs within the last 24 hours were reviewed.     Microbiology:  Microbiology Results (last 7 days)     Procedure Component Value Units Date/Time    Blood culture [[id number]] Collected:  03/19/20 2300    Order Status:  Completed Specimen:  Blood from Peripheral, Forearm, Left Updated:  03/25/20 0612     Blood Culture, Routine No growth after 5 days.    Blood culture [[id number]] Collected:  03/19/20 2300    Order Status:  Completed Specimen:  Blood from Peripheral, Hand, Left Updated:  03/25/20 0612     Blood Culture, Routine No growth after 5 days.    Culture, Respiratory with Gram Stain [[id number]] Collected:  03/20/20 0119    Order Status:  Completed Specimen:  Respiratory from Endotracheal Aspirate Updated:  03/23/20 1035     Respiratory Culture Normal respiratory prasanth      No S aureus or Pseudomonas isolated.     Gram Stain (Respiratory) <10 epithelial cells per low power field.     Gram Stain (Respiratory) Rare WBC's     Gram Stain (Respiratory) No organisms seen            Imaging      No results found for this or any previous visit.    X-Ray Abdomen AP 1 View  Narrative: EXAMINATION:  XR ABDOMEN AP 1 VIEW    CLINICAL HISTORY:  OG placment;    TECHNIQUE:  AP View(s) of the abdomen was performed.    COMPARISON:  None    FINDINGS:  OG tube tip and proximal port are below the GE junction.    Left flank and inferior true pelvis and bony pelvis are excluded from the examination.  Diaphragm is excluded from the examination.  Within the limits of the study I detect no bowel distension, intramural gas, intra portal gas or free peritoneal gas.  Impression: Please see above.    Electronically signed by: Shahrzad Wang MD  Date:    03/17/2020  Time:    18:13  X-Ray Chest AP  Portable  Narrative: EXAMINATION:  XR CHEST AP PORTABLE    CLINICAL HISTORY:  ET tube placement;    TECHNIQUE:  Single frontal view of the chest was performed.    COMPARISON:  10/14/2018    FINDINGS:  Endotracheal tube terminates in the mid trachea.  Nasogastric tube terminates just beneath the EG junction.  Right IJ catheter terminates in the SVC.  There is interval development of multifocal bilateral consolidation throughout both lungs.  No pleural effusion or pneumothorax.  Impression: 1. Interval development of multifocal bilateral consolidation.  2. Support devices as above.    Electronically signed by: Sunny Weber MD  Date:    03/17/2020  Time:    17:00      All imaging within the last 24 hours was reviewed.     Assessment and Plan:    Active Hospital Problems    Diagnosis  POA    *Acute respiratory failure with CoVID19 + [J96.00]  Yes    Hypokalemia [E87.6]  Unknown    Elevated lipase [R74.8]  Unknown    Type 2 diabetes mellitus without complication, without long-term current use of insulin [E11.9]  Yes     -180 goal remains, BGs ranging from 200s to 190s    Plan: D/C insulin drip, continue SQ insulin 40U BD, watch K      Essential hypertension [I10]  Yes      Resolved Hospital Problems   No resolved problems to display.     Scheduled Meds:   amLODIPine  10 mg Oral Daily    carvediloL  25 mg Oral BID    enoxaparin  40 mg Subcutaneous Q12H    hydrALAZINE  100 mg Oral Q8H    insulin aspart U-100  5 Units Subcutaneous TIDWM    insulin detemir U-100  20 Units Subcutaneous BID    INV remdesivir infusion  100 mg Intravenous Daily    isosorbide dinitrate  20 mg Oral TID    senna-docusate 8.6-50 mg  1 tablet Oral Daily     Continuous Infusions:  PRN Meds:.acetaminophen, Dextrose 10% Bolus, Dextrose 10% Bolus, glucagon (human recombinant), glucose, glucose, hydrALAZINE, insulin aspart U-100, sodium chloride 0.9%    PLAN:    Covid-19 Virus Infection  - COVID-19 testing at [facility] positive for  COVID 19  - Infection Control notified    - Isolation:   - Airborne and Droplet Precautions  - N95 masks must be fit tested, wear eye protection  - 20 second hand hygiene   - Limit visitors per hospital policy   - Consolidating lab draws, nursing care, and interventions    - Diagnostics: (rising CRP, persistent lymphopenia, hyponatremia, hyperferritinemia, elevated troponin, elevated d-dimer, age, and comorbidities are significant predictors of poor clinical outcome)   - CBC:   trend Q48hrs  - CMP:        trend Q48hrs  - Procalcitonin:  - D-dimer:  trend Q48hrs  - Ferritin:  repeat prior to discharge  - CRP:        trend Q48hrs  - LDH:  - BNP:  - Troponin:    - ECG:   - rapid Flu:   - RIP only if BMT/solid transplant:   - Legionella antigen:   - Blood culture x2:   - Sputum culture:   - CXR:   - UA and culture:      - Management:   - Bundle care as able to minimize in/out of room   - Supplemental O2 to maintain SpO2 >92%,   if requiring 6L NC or higher, place on nonrebreather and discuss case with MICU   - Telemetry & continuous Pulse Ox   - albuterol INHALER PRN 4puff Q6hr approximates a nebulizer (avoid nebulization of secretions)   - apap PRN fever   - Avoiding NIPPV to prevent aerosolization   (including home CPAP/BiPAP unless on a case-by-case basis and only in negative pressure room)   - Cautious use of NSAIDS for fever per WHO recommendations (3/16/2020)   - No new ACEi/ARB start or discontinuation of chronic med unless hypotensive (Esler et al. Journal of Hypertension 2020, 38:000-000)   - Careful use of steroids in the absence of other indications   - unless septic shock due to increased viral replication   - Fluid sparing resuscitation   - Empiric antibiotics per likely source & patient allergies    - CAP: x 5 day course  Ceftriaxone 1g IV Q24hrs            Azithromycin 500mg IV day #1, then 250mg PO daily x4 days                 If MRSA risk factors, add Vancomycin IV (PharmD consult)   - If patient meets  criteria per Hospital Protocol    - start statin (if CPK WNL)    - start HCQ 400mg PO BID x1 day, then 400mg PO daily x 4 days (check G6PD, ECG, and start Qshift POCT glucose)    Acute respiratory failure with CoVID19 +  ARDS 2/2 positive COVID 19 (tested at [facility])   On transfer: , ferritin 368, D-Dimer >33. CRP down trending (353 at admit, 22.4 on 3/24)          - Flu neg           - Plaquenil, azithromycin and ceftriaxone complete           - Started on remdezivir; daily labs to monitor include CBC, CMP, PT/INR, aPTT, UA ordered           - Extubated to BiPAP and well tolerated; transitioned to comfort flow on 3/25          - Methylprednisolone taper completed                     - discontinued Lasix           - Continue to wean oxygen and monitor respiratory status, transition to bubble flow           - Step down to floor today      Essential hypertension          - 25mg BID, Q6h hydralazine 100mg scheduled on 3/21, amlodipine 10 mg qd, Isosorbide dinitrate 20 TID          - Hydralazine PRN 10mg IV SBP >180      Hypokalemia          - Likely due to diuresis with lasix, lasix discontinued          - Hypo-K at 3.3, given PO potassium today          - Continue monitoring electrolytes with AM labs       Type 2 diabetes mellitus without complication, without long-term current use of insulin  Insulin dependent type 2 DM          - SQ 20U detemir BID, Aspart TID.           - SSI           - POCT Glucose      Elevated lipase          - TGs 800s last week at 570s few days ago 2/2 propofol use (off for over 4d).           - Lipase trending down 3/27: 261          - Continue to trend Lipase          - No clinical signs of pancreatitis; will consider CT A/P to evaluate for pancreatitis if needed       Goals of care, counseling/discussion  - Reviewed the typical clinical course of COVID19 with patient, including the potential for acute decompensation requiring intubation and mechanical ventilation  - Discussed again  as part of routine daily evaluation, patient/POA maintains code status of FULL CODE    VTE High Risk Prophylaxis: enoxaparin 40mg sq QHS     Patient's chronic/stable medical conditions noted in the problem list above will be managed with the patient's home medications as tolerated.       Subsequent Hospital Care  Level 3 [id number] Total visit time was 35 minutes or greater with greater than 50% of time spent in counseling and coordination of care.       Aleksandr Dumont MD  Hospital Medicine Staff  Pager: [phone]; Spectra: [phone]

## 2020-03-28 NOTE — PROVIDER TRANSFER
[facility] Medicine ICU Acceptance Note    Date of Admit: 3/17/2020  Date of Transfer / Stepdown: 3/28/2020  Deepak, C/J, L, Onc (IV chemo w/in 1 month), Gyn/Onc, or other special case?: no  ICU team stepping patient down:MICU   ICU team member giving verbal handoff: Brenda Mukherjee MD  Accepting  team: [facility]    Brief History of Present Illness:      Riri Gonsalves is a 29yoF w/ PMH DM2, HTN, and benign intracranial HTN who presented to [facility] on 3/13 complaining of shortness of breath, fevers, and cough. 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 A few days later she was continuing to have fevers as high as 103 & was having worsening SOB at rest- went to [facility] ED & was initially feeling better on 2L NC. Labs on presentation were CRP 18.2, WBC 3.7, flu negative. Pt was admitted to medicine & started on empiric CAP coverage. Pt's condition continued to deteriorate- worsening leukopenia, lymphopenia, elevated LDH, & CRP, & increasing oxygen requirements. CXR showed worsening multifocal airspace disease. Pt was transferred to MICU on 3/14. Pt decompensated & was intubated on 3/15 & on 3/16 results came back positive for Covid 19. Echo showed normal LV function (EF 60%). Pt was proned on 3/16. Pt was supinated prior to transfer. Pt presents as transfer from [facility] ICU for possible ecmo.     Hospital/ICU Course:     Patient was transferred intubated and sedated from [facility] ICU for possible ECMO given hypoxic respiratory failure due to ARDS 2/2 COVID-19 and was proned on 3/17. Cardine started for HTN and insulin drip for hyperglycemia (Hx uncontrolled T2DM). On 3/18 she was supinated with improvement of vent settings and oxygenation. Started on Remdesivir 200mg on 3/23, after receiving Plaquenil and azithromycin. Patient  successfully extubated to BiPAP 55% Fio2, 15/5  on 3/24 and transitioned to comfort flow on 3/25 and has tolerated well.       Acute respiratory failure with CoVID19 +  ARDS 2/2 positive COVID 19 (tested at [facility])   On transfer: , ferritin 368, D-Dimer >33. CRP down trending (353 at admit, 22.4 on 3/24)          - Flu neg           - Plaquenil, azithromycin and ceftriaxone complete           - Started on remdezivir; daily labs to monitor include CBC, CMP, PT/INR, aPTT, UA ordered           - Extubated to BiPAP and well tolerated; transitioned to comfort flow on 3/25          - Methylprednisolone taper completed                     - discontinued Lasix           - Continue to wean oxygen and monitor respiratory status, transition to bubble flow           - Step down to floor today     Essential hypertension          - 25mg BID, Q6h hydralazine 100mg scheduled on 3/21, amlodipine 10 mg qd, Isosorbide dinitrate 20 TID          - Hydralazine PRN 10mg IV SBP >180      Hypokalemia          - Likely due to diuresis with lasix, lasix discontinued          - Hypo-K at 3.3, given PO potassium today          - Continue monitoring electrolytes with AM labs       Type 2 diabetes mellitus without complication, without long-term current use of insulin  Insulin dependent type 2 DM          - SQ 20U detemir BID, Aspart TID.           - SSI           - POCT Glucose     Elevated lipase          - TGs 800s last week at 570s few days ago 2/2 propofol use (off for over 4d).           - Lipase trending down 3/27: 261          - Continue to trend Lipase          - No clinical signs of pancreatitis; will consider CT A/P to evaluate for pancreatitis if needed       Consultants and Procedures:     Consultants:  Infectious Disease  Wound Care  Nutrition     Procedures:    None    Transfer Information:     Diet:  Diabetic     Physical Activity:  PT/OT recommend SNF    To Do / Pending Studies / Follow ups:    - wean oxygen   - continue  remdesivir x9 day course (end- 4/1)  - continue to titrate antihypertensives   - continue insulin regimen   - replete lytes prn  - trend lipase and triglycerides   - follow up with case management regarding SNF placement     Patient has been accepted by Hospital Medicine Team IMG, who will assume care of the patient upon arrival to the floor from the ICU. Please contact ICU team with any concerns prior to arrival. Please contact Hospital Medicine at [phone] or [phone] (please do NOT leave a voicemail) when patient arrives to the floor.    Aleksandr Dumont MD  Hospital Medicine Staff  Pager: [phone]; Spectra: [phone]

## 2020-03-28 NOTE — CARE UPDATE
Rapid Response Nurse Chart Check     Chart check completed, abnormal VS noted. Please call [phone] for further concerns or assistance.

## 2020-03-29 LAB
ALBUMIN SERPL BCP-MCNC: 2.3 G/DL (ref 3.5–5.2)
ALP SERPL-CCNC: 66 U/L (ref 55–135)
ALT SERPL W/O P-5'-P-CCNC: 27 U/L (ref 10–44)
ANION GAP SERPL CALC-SCNC: 7 MMOL/L (ref 8–16)
APTT BLDCRRT: 24.4 SEC (ref 21–32)
AST SERPL-CCNC: 22 U/L (ref 10–40)
BASOPHILS # BLD AUTO: 0.04 K/UL (ref 0–0.2)
BASOPHILS NFR BLD: 0.6 % (ref 0–1.9)
BILIRUB SERPL-MCNC: 0.4 MG/DL (ref 0.1–1)
BUN SERPL-MCNC: 12 MG/DL (ref 6–20)
CALCIUM SERPL-MCNC: 8.8 MG/DL (ref 8.7–10.5)
CHLORIDE SERPL-SCNC: 103 MMOL/L (ref 95–110)
CO2 SERPL-SCNC: 28 MMOL/L (ref 23–29)
CREAT SERPL-MCNC: 0.8 MG/DL (ref 0.5–1.4)
CRP SERPL-MCNC: 47.4 MG/L (ref 0–8.2)
DIFFERENTIAL METHOD: ABNORMAL
EOSINOPHIL # BLD AUTO: 0.1 K/UL (ref 0–0.5)
EOSINOPHIL NFR BLD: 1.7 % (ref 0–8)
ERYTHROCYTE [DISTWIDTH] IN BLOOD BY AUTOMATED COUNT: 14.4 % (ref 11.5–14.5)
EST. GFR  (AFRICAN AMERICAN): >60 ML/MIN/1.73 M^2
EST. GFR  (NON AFRICAN AMERICAN): >60 ML/MIN/1.73 M^2
GLUCOSE SERPL-MCNC: 296 MG/DL (ref 70–110)
HCT VFR BLD AUTO: 32 % (ref 37–48.5)
HGB BLD-MCNC: 9.3 G/DL (ref 12–16)
IMM GRANULOCYTES # BLD AUTO: 0.1 K/UL (ref 0–0.04)
IMM GRANULOCYTES NFR BLD AUTO: 1.5 % (ref 0–0.5)
INR PPP: 1 (ref 0.8–1.2)
LACTATE SERPL-SCNC: 0.9 MMOL/L (ref 0.5–2.2)
LYMPHOCYTES # BLD AUTO: 2.1 K/UL (ref 1–4.8)
LYMPHOCYTES NFR BLD: 31.6 % (ref 18–48)
MAGNESIUM SERPL-MCNC: 1.9 MG/DL (ref 1.6–2.6)
MCH RBC QN AUTO: 25.6 PG (ref 27–31)
MCHC RBC AUTO-ENTMCNC: 29.1 G/DL (ref 32–36)
MCV RBC AUTO: 88 FL (ref 82–98)
MONOCYTES # BLD AUTO: 0.7 K/UL (ref 0.3–1)
MONOCYTES NFR BLD: 10 % (ref 4–15)
NEUTROPHILS # BLD AUTO: 3.6 K/UL (ref 1.8–7.7)
NEUTROPHILS NFR BLD: 54.6 % (ref 38–73)
NRBC BLD-RTO: 0 /100 WBC
PHOSPHATE SERPL-MCNC: 4.2 MG/DL (ref 2.7–4.5)
PLATELET # BLD AUTO: 286 K/UL (ref 150–350)
PMV BLD AUTO: 11.5 FL (ref 9.2–12.9)
POCT GLUCOSE: 237 MG/DL (ref 70–110)
POCT GLUCOSE: 261 MG/DL (ref 70–110)
POCT GLUCOSE: 263 MG/DL (ref 70–110)
POCT GLUCOSE: 282 MG/DL (ref 70–110)
POCT GLUCOSE: 305 MG/DL (ref 70–110)
POTASSIUM SERPL-SCNC: 3.6 MMOL/L (ref 3.5–5.1)
PROT SERPL-MCNC: 5.7 G/DL (ref 6–8.4)
PROTHROMBIN TIME: 10.5 SEC (ref 9–12.5)
RBC # BLD AUTO: 3.63 M/UL (ref 4–5.4)
SODIUM SERPL-SCNC: 138 MMOL/L (ref 136–145)
WBC # BLD AUTO: 6.52 K/UL (ref 3.9–12.7)

## 2020-03-29 PROCEDURE — 85025 COMPLETE CBC W/AUTO DIFF WBC: CPT

## 2020-03-29 PROCEDURE — 84100 ASSAY OF PHOSPHORUS: CPT

## 2020-03-29 PROCEDURE — 83735 ASSAY OF MAGNESIUM: CPT

## 2020-03-29 PROCEDURE — 85610 PROTHROMBIN TIME: CPT

## 2020-03-29 PROCEDURE — 83605 ASSAY OF LACTIC ACID: CPT

## 2020-03-29 PROCEDURE — 20600001 HC STEP DOWN PRIVATE ROOM

## 2020-03-29 PROCEDURE — 63600175 PHARM REV CODE 636 W HCPCS: Performed by: STUDENT IN AN ORGANIZED HEALTH CARE EDUCATION/TRAINING PROGRAM

## 2020-03-29 PROCEDURE — 99232 SBSQ HOSP IP/OBS MODERATE 35: CPT | Mod: ,,, | Performed by: INTERNAL MEDICINE

## 2020-03-29 PROCEDURE — 94761 N-INVAS EAR/PLS OXIMETRY MLT: CPT

## 2020-03-29 PROCEDURE — 86140 C-REACTIVE PROTEIN: CPT

## 2020-03-29 PROCEDURE — 36415 COLL VENOUS BLD VENIPUNCTURE: CPT

## 2020-03-29 PROCEDURE — 25000003 PHARM REV CODE 250: Performed by: INTERNAL MEDICINE

## 2020-03-29 PROCEDURE — 80053 COMPREHEN METABOLIC PANEL: CPT

## 2020-03-29 PROCEDURE — 99232 PR SUBSEQUENT HOSPITAL CARE,LEVL II: ICD-10-PCS | Mod: ,,, | Performed by: INTERNAL MEDICINE

## 2020-03-29 PROCEDURE — 85730 THROMBOPLASTIN TIME PARTIAL: CPT

## 2020-03-29 RX ADMIN — CARVEDILOL 25 MG: 25 TABLET, FILM COATED ORAL at 08:03

## 2020-03-29 RX ADMIN — ENOXAPARIN SODIUM 40 MG: 100 INJECTION SUBCUTANEOUS at 08:03

## 2020-03-29 RX ADMIN — INSULIN ASPART 4 UNITS: 100 INJECTION, SOLUTION INTRAVENOUS; SUBCUTANEOUS at 08:03

## 2020-03-29 RX ADMIN — ISOSORBIDE DINITRATE 20 MG: 10 TABLET ORAL at 08:03

## 2020-03-29 RX ADMIN — INSULIN ASPART 5 UNITS: 100 INJECTION, SOLUTION INTRAVENOUS; SUBCUTANEOUS at 08:03

## 2020-03-29 RX ADMIN — ISOSORBIDE DINITRATE 20 MG: 10 TABLET ORAL at 05:03

## 2020-03-29 RX ADMIN — INSULIN ASPART 6 UNITS: 100 INJECTION, SOLUTION INTRAVENOUS; SUBCUTANEOUS at 08:03

## 2020-03-29 RX ADMIN — ISOSORBIDE DINITRATE 20 MG: 10 TABLET ORAL at 12:03

## 2020-03-29 RX ADMIN — INSULIN ASPART 5 UNITS: 100 INJECTION, SOLUTION INTRAVENOUS; SUBCUTANEOUS at 06:03

## 2020-03-29 RX ADMIN — INSULIN DETEMIR 20 UNITS: 100 INJECTION, SOLUTION SUBCUTANEOUS at 08:03

## 2020-03-29 RX ADMIN — HYDRALAZINE HYDROCHLORIDE 100 MG: 50 TABLET, FILM COATED ORAL at 08:03

## 2020-03-29 RX ADMIN — INSULIN ASPART 4 UNITS: 100 INJECTION, SOLUTION INTRAVENOUS; SUBCUTANEOUS at 06:03

## 2020-03-29 RX ADMIN — HYDRALAZINE HYDROCHLORIDE 100 MG: 50 TABLET, FILM COATED ORAL at 05:03

## 2020-03-29 RX ADMIN — AMLODIPINE BESYLATE 10 MG: 10 TABLET ORAL at 08:03

## 2020-03-29 RX ADMIN — INSULIN ASPART 5 UNITS: 100 INJECTION, SOLUTION INTRAVENOUS; SUBCUTANEOUS at 12:03

## 2020-03-29 RX ADMIN — INSULIN ASPART 6 UNITS: 100 INJECTION, SOLUTION INTRAVENOUS; SUBCUTANEOUS at 12:03

## 2020-03-29 RX ADMIN — HYDRALAZINE HYDROCHLORIDE 100 MG: 50 TABLET, FILM COATED ORAL at 12:03

## 2020-03-29 NOTE — PLAN OF CARE
Problem: Adult Inpatient Plan of Care  Goal: Plan of Care Review  Outcome: Ongoing, Progressing  Flowsheets (Taken 3/28/2020 1922)  Plan of Care Reviewed With: patient  Pt remains free from falls/injury. No acute events during shift. VSS, NAD. Bed in lowest position, wheels locked, side rails up times 2, call light w/in reach, bed alarm on. Room clear of obstacles. Pt BG monitored AC/HS as per order. Pt covered with insulin aspart per low sliding scale. No S&S of hypo/hyperglycemia noticed. Pt remains afibrile during shift. Pt weaned down to 4 L NC with good O2 sats. WCTM.

## 2020-03-29 NOTE — PLAN OF CARE
Problem: Adult Inpatient Plan of Care  Goal: Plan of Care Review  Outcome: Ongoing, Progressing  Flowsheets (Taken 3/29/2020 1723)  Plan of Care Reviewed With: patient     Pt remains free from falls/injury. No acute events during shift. VSS, NAD. Bed in lowest position, wheels locked, side rails up times 2, call light w/in reach, bed alarm on. Room clear of obstacles. Pt up in chair and ambulates to bathroom throughout the shift. Pt BG monitored AC/HS as per order. No S&S of hypo/hyperglycemia noticed. Pt remains afibrile during shift. Pt weaned down to 2L NC with good O2 sats. WCTM.

## 2020-03-29 NOTE — PROGRESS NOTES
Hospital Medicine  Progress Note  [facility]      Patient Name: Riri Gonsalves  MRN:  [id number]  [facility] Medicine Team: [facility] HOSP MED G Aleksandr Dumont MD  Date of Admission:  3/17/2020     Length of Stay:  LOS: 12 days       Principal Problem:  Acute respiratory failure      HPI:    Riri Gonsalves is a 29yoF w/ PMH DM2, HTN, and benign intracranial HTN who presented to [facility] on 3/13 complaining of shortness of breath, fevers, and cough. 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 According to notes from OSH pt had rhinorrhea & dry cough for several days- went to urgent care where she was told she had flu-like symptoms & was discharged home on amoxicillin. A few days later she was continuing to have fevers as high as 103 & was having worsening SOB at rest- went to [facility] ED & was initially feeling better on 2L NC. Labs on presentation were CRP 18.2, WBC 3.7, flu negative. Pt was admitted to medicine & started on empiric CAP coverage. Pt's condition continued to deteriorate- worsening leukopenia, lymphopenia, elevated LDH, & CRP, & increasing oxygen requirements. CXR showed worsening multifocal airspace disease. Pt was transferred to MICU on 3/14. Pt decompensated & was intubated on 3/15 & on 3/16 results came back positive for Covid 19. Echo showed normal LV function (EF 60%). Pt was proned on 3/16. Pt was supinated prior to transfer. Pt presents as transfer from [facility] ICU for possible ecmo.    Hospital Course:  Patient admitted for evaluation of possible COVID-19.    Interval History:     Overnight: no acute events   Patient sitting in chair, mild respiratory distress. Reports continued improvement shortness of breath and cough. Denies fever, chills, or diarrhea. Good appetite and ambulating to bathroom.     Review of Systems:  Constitutional: Positive for fever, chills, fatigue, poor appetite   HENT: Positive for sore throat, negative for trouble swallowing.    Eyes: Negative for  photophobia, visual disturbance.   Respiratory: Positive for cough, shortness of breath  Cardiovascular: Negative for chest pain, palpitations, leg swelling.   Gastrointestinal: Negative for abdominal pain, constipation, nausea, vomiting.   Endocrine: Negative for cold intolerance, heat intolerance.   Genitourinary: Negative for dysuria, frequency.   Musculoskeletal: Negative for arthralgias, myalgias.   Skin: Negative for rash  Neurological: Negative for dizziness, syncope, light-headedness.   Psychiatric/Behavioral: Negative for confusion, hallucinations, anxiety      Inpatient Medications:    Current Facility-Administered Medications:     acetaminophen oral solution 999.0148 mg, 999.0148 mg, Oral, Q6H PRN, Brenda Mukherjee MD, 999.0148 mg at 03/19/20 1001    amLODIPine tablet 10 mg, 10 mg, Oral, Daily, Ankush Shaikh MD, 10 mg at 03/29/20 0858    carvediloL tablet 25 mg, 25 mg, Oral, BID, Ankush Shaikh MD, 25 mg at 03/29/20 0858    dextrose 10% (D10W) Bolus, 12.5 g, Intravenous, PRN, Sara Justice MD    dextrose 10% (D10W) Bolus, 25 g, Intravenous, PRN, Sara Justice MD    enoxaparin injection 40 mg, 40 mg, Subcutaneous, Q12H, Behram Khan, MD, 40 mg at 03/29/20 0856    glucagon (human recombinant) injection 1 mg, 1 mg, Intramuscular, PRN, Sara Justice MD    glucose chewable tablet 16 g, 16 g, Oral, PRN, Sara Justice MD    glucose chewable tablet 24 g, 24 g, Oral, PRN, Sara Justice MD    hydrALAZINE injection 10 mg, 10 mg, Intravenous, Q4H PRN, Teodoro Comer MD, 10 mg at 03/25/20 1046    hydrALAZINE tablet 100 mg, 100 mg, Oral, Q8H, Ankush Shaikh MD, 100 mg at 03/29/20 0543    insulin aspart U-100 pen 1-10 Units, 1-10 Units, Subcutaneous, QID (AC + HS) PRN, Sara Justice MD, 6 Units at 03/29/20 0854    insulin aspart U-100 pen 5 Units, 5 Units, Subcutaneous, TIDWM, Sara Justice MD, 5 Units at 03/29/20 0854    insulin  "detemir U-100 pen 20 Units, 20 Units, Subcutaneous, BID, Sara Justice MD, 20 Units at 03/29/20 0855    [COMPLETED] INV remdesivir 200 mg in INV sodium chloride 0.9 % 250 mL infusion, 200 mg, Intravenous, Q24H, Last Rate: 500 mL/hr at 03/23/20 2019, 200 mg at 03/23/20 2019 **FOLLOWED BY** INV remdesivir 100 mg in INV sodium chloride 0.9 % 250 mL infusion, 100 mg, Intravenous, Daily, Arnoldo Hernandez MD, Last Rate: 500 mL/hr at 03/28/20 1805, 100 mg at 03/28/20 1805    isosorbide dinitrate tablet 20 mg, 20 mg, Oral, TID, Ankush Shaikh MD, 20 mg at 03/29/20 0543    senna-docusate 8.6-50 mg per tablet 1 tablet, 1 tablet, Oral, Daily, Ankush Shaikh MD, 1 tablet at 03/27/20 0838    sodium chloride 0.9% flush 10 mL, 10 mL, Intravenous, PRN, Hector Amanda MD      Physical Exam:    No intake or output data in the 24 hours ending 03/29/20 1157  Wt Readings from Last 3 Encounters:   03/27/20 (!) 156.8 kg (345 lb 10.9 oz)   03/17/20 (!) 150 kg (330 lb 11 oz)   10/14/18 (!) 158.3 kg (349 lb)       /72 (BP Location: Right arm, Patient Position: Lying)   Pulse 93   Temp 99.5 °F (37.5 °C) (Oral)   Resp 18   Ht 5' 11" (1.803 m)   Wt (!) 156.8 kg (345 lb 10.9 oz)   SpO2 96%   Breastfeeding? No   BMI 48.21 kg/m²     GEN: NAD, conversant  HEENT: NC/AT  Cardio: deferred due to risk of COVID exposure and to conserve PPE  Resp: deferred due to risk of COVID exposure and to conserve PPE  Skin: no rash  Neuro: Moving all extremities spontaneously   Psych: Normal mood and affect       Laboratory:  No results found for: [id number]    Recent Labs   Lab 03/27/20  0626 03/28/20  0500 03/29/20  0300   WBC 8.14 8.65 6.52   LYMPH 22.0  1.8 27.2  2.4 31.6  2.1   HGB 10.6* 10.4* 9.3*   HCT 35.0* 35.2* 32.0*    299 286     Recent Labs   Lab 03/24/20  0435 03/25/20  0413  03/27/20  0626 03/28/20  0500 03/29/20  0300    144   < > 137 138 138   K 3.1* 2.8*   < > 3.3* 3.3* 3.6 "   CL 99 96   < > 97 98 103   CO2 32* 35*   < > 29 31* 28   BUN 19 20   < > 13 11 12   CREATININE 0.7 0.7   < > 0.7 0.8 0.8   * 166*   < > 165* 206* 296*   CALCIUM 8.9 9.2   < > 8.7 8.7 8.8   MG 1.6 1.7   < > 2.0 2.1 1.9   PHOS 2.9 3.8   < > 3.1 3.4 4.2   LIPASE 210* 359*  --  291*  --   --     < > = values in this interval not displayed.     Recent Labs   Lab 03/27/20 0626 03/28/20  0500 03/29/20  0300   ALKPHOS 57 60 66   ALT 38 33 27   AST 42* 26 22   ALBUMIN 2.5* 2.7* 2.3*   PROT 6.1 6.5 5.7*   BILITOT 0.9 0.6 0.4   INR 1.1 1.1 1.0        Recent Labs     03/27/20 0626 03/28/20  0500 03/29/20  0300   CRP 58.3* 64.0* 47.4*       All labs within the last 24 hours were reviewed.     Microbiology:  Microbiology Results (last 7 days)     Procedure Component Value Units Date/Time    Blood culture [[id number]] Collected:  03/19/20 2300    Order Status:  Completed Specimen:  Blood from Peripheral, Forearm, Left Updated:  03/25/20 0612     Blood Culture, Routine No growth after 5 days.    Blood culture [[id number]] Collected:  03/19/20 2300    Order Status:  Completed Specimen:  Blood from Peripheral, Hand, Left Updated:  03/25/20 0612     Blood Culture, Routine No growth after 5 days.    Culture, Respiratory with Gram Stain [[id number]] Collected:  03/20/20 0119    Order Status:  Completed Specimen:  Respiratory from Endotracheal Aspirate Updated:  03/23/20 1035     Respiratory Culture Normal respiratory prasanth      No S aureus or Pseudomonas isolated.     Gram Stain (Respiratory) <10 epithelial cells per low power field.     Gram Stain (Respiratory) Rare WBC's     Gram Stain (Respiratory) No organisms seen            Imaging      No results found for this or any previous visit.    X-Ray Abdomen AP 1 View  Narrative: EXAMINATION:  XR ABDOMEN AP 1 VIEW    CLINICAL HISTORY:  OG placment;    TECHNIQUE:  AP View(s) of the abdomen was performed.    COMPARISON:  None    FINDINGS:  OG tube tip and proximal port are below  the GE junction.    Left flank and inferior true pelvis and bony pelvis are excluded from the examination.  Diaphragm is excluded from the examination.  Within the limits of the study I detect no bowel distension, intramural gas, intra portal gas or free peritoneal gas.  Impression: Please see above.    Electronically signed by: Shahrzad Wang MD  Date:    03/17/2020  Time:    18:13  X-Ray Chest AP Portable  Narrative: EXAMINATION:  XR CHEST AP PORTABLE    CLINICAL HISTORY:  ET tube placement;    TECHNIQUE:  Single frontal view of the chest was performed.    COMPARISON:  10/14/2018    FINDINGS:  Endotracheal tube terminates in the mid trachea.  Nasogastric tube terminates just beneath the EG junction.  Right IJ catheter terminates in the SVC.  There is interval development of multifocal bilateral consolidation throughout both lungs.  No pleural effusion or pneumothorax.  Impression: 1. Interval development of multifocal bilateral consolidation.  2. Support devices as above.    Electronically signed by: Sunny Weber MD  Date:    03/17/2020  Time:    17:00      All imaging within the last 24 hours was reviewed.     Assessment and Plan:    Active Hospital Problems    Diagnosis  POA    *Acute respiratory failure with CoVID19 + [J96.00]  Yes    Hypokalemia [E87.6]  Unknown    Elevated lipase [R74.8]  Unknown    Type 2 diabetes mellitus without complication, without long-term current use of insulin [E11.9]  Yes     -180 goal remains, BGs ranging from 200s to 190s    Plan: D/C insulin drip, continue SQ insulin 40U BD, watch K      Essential hypertension [I10]  Yes      Resolved Hospital Problems   No resolved problems to display.     Scheduled Meds:   amLODIPine  10 mg Oral Daily    carvediloL  25 mg Oral BID    enoxaparin  40 mg Subcutaneous Q12H    hydrALAZINE  100 mg Oral Q8H    insulin aspart U-100  5 Units Subcutaneous TIDWM    insulin detemir U-100  20 Units Subcutaneous BID    INV remdesivir  infusion  100 mg Intravenous Daily    isosorbide dinitrate  20 mg Oral TID    senna-docusate 8.6-50 mg  1 tablet Oral Daily     Continuous Infusions:  PRN Meds:.acetaminophen, Dextrose 10% Bolus, Dextrose 10% Bolus, glucagon (human recombinant), glucose, glucose, hydrALAZINE, insulin aspart U-100, sodium chloride 0.9%    PLAN:    Covid-19 Virus Infection  - COVID-19 testing: 3/16/20 at [facility]- positive   - Infection Control notified    - Isolation:   - Airborne and Droplet Precautions  - Surgical mask on patient   - N95 masks must be fit tested, wear eye protection  - 20 second hand hygiene   - Limit visitors per hospital policy   - Consolidate lab draws, nursing care, and interventions    - Diagnostics: (Lymphopenia, hyponatremia, hyperferritinemia, elevated troponin, elevated d-dimer, age, and comorbidities are significant predictors of poor clinical outcome)   - CBC: wnl  trend Q48hrs  - CMP: wnl       trend Q48hrs  - Procalcitonin: N/A  - D-dimer:  >33  repeat prior to discharge  - Ferritin:  368   repeat prior to discharge   - CRP: 47.4       trend Q48hrs  - LDH: 630  repeat prior to discharge  - BNP:   - Troponin: 0.043    - ECG: Sinus tachycardia    - rapid Flu: negative    - RIP only if BMT/solid transplant:   - Legionella antigen: N/A   - Blood culture x2: NG   - Sputum culture: Normal prasanth   - CXR: multifocal bilateral consolidation   - UA and culture: negative    - CPK: 260    - Management:   Bundle care as able to maintain isolation & minimize in/out of room   - Supplemental O2 to maintain SpO2 92%-96%   if requiring 6L NC or higher, place on nonrebreather and discuss case with MICU   - Telemetry & continuous pulse oximetry    - If wheezing   - albuterol inhaler 2-4 puff Q6hr PRN    - ipratropium daily    - acetaminophen 650mg PO Q6hr PRN fever   - loperamide PRN for viral diarrhea  - Empiric antibiotics per likely source & patient allergies    - CAP: x 5 day course (d/c early if low concern for  bacterial co-infection)  Completed Ceftriaxone 1g IV Q24hrs            Completed Azithromycin 500mg IV day #1, then 250mg PO daily x4 days     If azithromycin is not available, start doxycycline                 If MRSA risk factors, add Vancomycin IV (PharmD consult)   - Investigational Treatment Protocol: (if patient meets criteria)   [email or web address]     - statin: atorvastatin 40mg po daily (if CPK WNL)    - completed HCQ 400mg PO BID x1 day, then 400mg PO daily x 4 days   (check glucose 6 phosphate dehydrogenase (NOT G6PD Quant), ECG, and start Qshift POCT glucose)    Safety notes:   - Avoid NIPPV (CPAP/BiPAP) to prevent aerosolization, use on a case-by-case basis if in neg pressure room   - Cautious use of NSAIDS for fever per WHO recommendations (3/16/2020)   - No new ACEi/ARB start or discontinuation of chronic med unless hypotensive (Esler et al. Journal of Hypertension 2020, 38:000-000)   - Careful use of steroids in the absence of other indications (shock, ARDS)   - Fluid sparing resuscitation, avoid maintenance fluids     Acute respiratory failure   - ARDS 2/2 positive COVID 19 (tested at [facility])   - On transfer: , ferritin 368, D-Dimer >33. CRP down trending (353 at admit, 22.4 on 3/24  - Completed Plaquenil, azithromycin and ceftriaxone  - Continue remdezivir (end date- 4/1); daily labs to monitor include CBC, CMP, PT/INR, aPTT, UA ordered  - s/p Extubation to BiPAP and well tolerated; transitioned to nasal cannula  - Methylprednisolone taper completed          - discontinued Lasix  - weaned from 4 L to 2L NC. Continue to wean oxygen and monitor respiratory status     Essential hypertension  - continue hydralazine, coreg, amlodipine and isosorbide dinitrate     Hypokalemia  - Likely due to diuresis with lasix, lasix discontinue  - replete lytes prn      Type 2 diabetes mellitus without complication,  without long-term current use of insulin  - Insulin dependent type 2 DM  - HA1c: 12.4  - SQ 22U detemir BID, Aspart TID.   - SSI   - POCT Glucose ACHS      Elevated lipase- improving   - TGs 800s last week at 570s few days ago 2/2 propofol use (off for over 4d)  - No clinical signs of pancreatitis     Debility  - PT/OT rec SNF but patient prefers     Advance Care Planning  Goals of care, counseling/discussion: FULL code   Advance Care Planning       VTE High Risk Prophylaxis: enoxaparin 40mg sq QHS     Patient's chronic/stable medical conditions noted in the problem list above will be managed with the patient's home medications as tolerated.       Subsequent Inpatient Hospital Care    Level 2 55708 Total visit time was 25 minutes or greater with greater than 50% of time spent in counseling and coordination of care.         Aleksandr Dumont MD  Hospital Medicine Staff  Pager: [phone]; Spectra: [phone]

## 2020-03-29 NOTE — PLAN OF CARE
Full assessment as documented.  Pt remains comfortable on 4L at rest and with ambulation to BR. Pt without c/o this shift. Remains afebrile.    Will continue current level of monitoring and plan of care.  Will notify primary team of any changes in pt status.

## 2020-03-30 VITALS
TEMPERATURE: 99 F | WEIGHT: 293 LBS | BODY MASS INDEX: 41.02 KG/M2 | RESPIRATION RATE: 18 BRPM | SYSTOLIC BLOOD PRESSURE: 139 MMHG | DIASTOLIC BLOOD PRESSURE: 80 MMHG | HEART RATE: 97 BPM | OXYGEN SATURATION: 94 % | HEIGHT: 71 IN

## 2020-03-30 PROBLEM — E87.6 HYPOKALEMIA: Status: RESOLVED | Noted: 2020-03-25 | Resolved: 2020-03-30

## 2020-03-30 PROBLEM — J96.00 ACUTE RESPIRATORY FAILURE: Status: RESOLVED | Noted: 2020-03-17 | Resolved: 2020-03-30

## 2020-03-30 PROBLEM — R74.8 ELEVATED LIPASE: Status: RESOLVED | Noted: 2020-03-24 | Resolved: 2020-03-30

## 2020-03-30 LAB
ALBUMIN SERPL BCP-MCNC: 2.7 G/DL (ref 3.5–5.2)
ALP SERPL-CCNC: 60 U/L (ref 55–135)
ALT SERPL W/O P-5'-P-CCNC: 25 U/L (ref 10–44)
ANION GAP SERPL CALC-SCNC: 7 MMOL/L (ref 8–16)
APTT BLDCRRT: 24 SEC (ref 21–32)
AST SERPL-CCNC: 17 U/L (ref 10–40)
BASOPHILS # BLD AUTO: 0.03 K/UL (ref 0–0.2)
BASOPHILS NFR BLD: 0.5 % (ref 0–1.9)
BILIRUB SERPL-MCNC: 0.4 MG/DL (ref 0.1–1)
BUN SERPL-MCNC: 7 MG/DL (ref 6–20)
CALCIUM SERPL-MCNC: 8.5 MG/DL (ref 8.7–10.5)
CHLORIDE SERPL-SCNC: 103 MMOL/L (ref 95–110)
CO2 SERPL-SCNC: 27 MMOL/L (ref 23–29)
CREAT SERPL-MCNC: 0.8 MG/DL (ref 0.5–1.4)
CRP SERPL-MCNC: 44.2 MG/L (ref 0–8.2)
DIFFERENTIAL METHOD: ABNORMAL
EOSINOPHIL # BLD AUTO: 0.1 K/UL (ref 0–0.5)
EOSINOPHIL NFR BLD: 1.3 % (ref 0–8)
ERYTHROCYTE [DISTWIDTH] IN BLOOD BY AUTOMATED COUNT: 14.5 % (ref 11.5–14.5)
EST. GFR  (AFRICAN AMERICAN): >60 ML/MIN/1.73 M^2
EST. GFR  (NON AFRICAN AMERICAN): >60 ML/MIN/1.73 M^2
FERRITIN SERPL-MCNC: 218 NG/ML (ref 20–300)
GLUCOSE SERPL-MCNC: 322 MG/DL (ref 70–110)
HCT VFR BLD AUTO: 32.2 % (ref 37–48.5)
HGB BLD-MCNC: 9.4 G/DL (ref 12–16)
IMM GRANULOCYTES # BLD AUTO: 0.11 K/UL (ref 0–0.04)
IMM GRANULOCYTES NFR BLD AUTO: 2 % (ref 0–0.5)
INR PPP: 1 (ref 0.8–1.2)
LACTATE SERPL-SCNC: 1 MMOL/L (ref 0.5–2.2)
LIPASE SERPL-CCNC: 370 U/L (ref 4–60)
LYMPHOCYTES # BLD AUTO: 1.5 K/UL (ref 1–4.8)
LYMPHOCYTES NFR BLD: 27.6 % (ref 18–48)
MAGNESIUM SERPL-MCNC: 1.9 MG/DL (ref 1.6–2.6)
MCH RBC QN AUTO: 25.3 PG (ref 27–31)
MCHC RBC AUTO-ENTMCNC: 29.2 G/DL (ref 32–36)
MCV RBC AUTO: 87 FL (ref 82–98)
MONOCYTES # BLD AUTO: 0.5 K/UL (ref 0.3–1)
MONOCYTES NFR BLD: 8.2 % (ref 4–15)
NEUTROPHILS # BLD AUTO: 3.3 K/UL (ref 1.8–7.7)
NEUTROPHILS NFR BLD: 60.4 % (ref 38–73)
NRBC BLD-RTO: 0 /100 WBC
PHOSPHATE SERPL-MCNC: 2.9 MG/DL (ref 2.7–4.5)
PLATELET # BLD AUTO: 343 K/UL (ref 150–350)
PMV BLD AUTO: 11.5 FL (ref 9.2–12.9)
POCT GLUCOSE: 273 MG/DL (ref 70–110)
POCT GLUCOSE: 317 MG/DL (ref 70–110)
POTASSIUM SERPL-SCNC: 3.7 MMOL/L (ref 3.5–5.1)
PROT SERPL-MCNC: 6.2 G/DL (ref 6–8.4)
PROTHROMBIN TIME: 10.4 SEC (ref 9–12.5)
RBC # BLD AUTO: 3.71 M/UL (ref 4–5.4)
SODIUM SERPL-SCNC: 137 MMOL/L (ref 136–145)
WBC # BLD AUTO: 5.5 K/UL (ref 3.9–12.7)

## 2020-03-30 PROCEDURE — 25000003 PHARM REV CODE 250: Performed by: INTERNAL MEDICINE

## 2020-03-30 PROCEDURE — 63600175 PHARM REV CODE 636 W HCPCS: Performed by: STUDENT IN AN ORGANIZED HEALTH CARE EDUCATION/TRAINING PROGRAM

## 2020-03-30 PROCEDURE — 82728 ASSAY OF FERRITIN: CPT

## 2020-03-30 PROCEDURE — 83605 ASSAY OF LACTIC ACID: CPT

## 2020-03-30 PROCEDURE — 36415 COLL VENOUS BLD VENIPUNCTURE: CPT

## 2020-03-30 PROCEDURE — 83735 ASSAY OF MAGNESIUM: CPT

## 2020-03-30 PROCEDURE — 97535 SELF CARE MNGMENT TRAINING: CPT

## 2020-03-30 PROCEDURE — 85610 PROTHROMBIN TIME: CPT

## 2020-03-30 PROCEDURE — 84100 ASSAY OF PHOSPHORUS: CPT

## 2020-03-30 PROCEDURE — 83690 ASSAY OF LIPASE: CPT

## 2020-03-30 PROCEDURE — 97116 GAIT TRAINING THERAPY: CPT | Mod: CQ

## 2020-03-30 PROCEDURE — 97110 THERAPEUTIC EXERCISES: CPT

## 2020-03-30 PROCEDURE — 97530 THERAPEUTIC ACTIVITIES: CPT | Mod: CQ

## 2020-03-30 PROCEDURE — 86140 C-REACTIVE PROTEIN: CPT

## 2020-03-30 PROCEDURE — 99239 HOSP IP/OBS DSCHRG MGMT >30: CPT | Mod: ,,, | Performed by: INTERNAL MEDICINE

## 2020-03-30 PROCEDURE — 97110 THERAPEUTIC EXERCISES: CPT | Mod: CQ

## 2020-03-30 PROCEDURE — 85025 COMPLETE CBC W/AUTO DIFF WBC: CPT

## 2020-03-30 PROCEDURE — 99239 PR HOSPITAL DISCHARGE DAY,>30 MIN: ICD-10-PCS | Mod: ,,, | Performed by: INTERNAL MEDICINE

## 2020-03-30 PROCEDURE — 80053 COMPREHEN METABOLIC PANEL: CPT

## 2020-03-30 PROCEDURE — 85730 THROMBOPLASTIN TIME PARTIAL: CPT

## 2020-03-30 RX ORDER — HYDRALAZINE HYDROCHLORIDE 100 MG/1
100 TABLET, FILM COATED ORAL EVERY 8 HOURS
Qty: 90 TABLET | Refills: 11 | Status: SHIPPED | OUTPATIENT
Start: 2020-03-30 | End: 2023-04-13

## 2020-03-30 RX ORDER — CARVEDILOL 25 MG/1
25 TABLET ORAL 2 TIMES DAILY
Qty: 60 TABLET | Refills: 11 | Status: SHIPPED | OUTPATIENT
Start: 2020-03-30 | End: 2020-03-30

## 2020-03-30 RX ORDER — CARVEDILOL 25 MG/1
25 TABLET ORAL 2 TIMES DAILY
Qty: 60 TABLET | Refills: 11 | Status: SHIPPED | OUTPATIENT
Start: 2020-03-30 | End: 2023-04-13

## 2020-03-30 RX ORDER — AMLODIPINE BESYLATE 10 MG/1
10 TABLET ORAL DAILY
Qty: 30 TABLET | Refills: 11 | Status: SHIPPED | OUTPATIENT
Start: 2020-03-31 | End: 2023-04-13

## 2020-03-30 RX ORDER — ISOSORBIDE DINITRATE 20 MG/1
20 TABLET ORAL 3 TIMES DAILY
Qty: 90 TABLET | Refills: 11 | Status: SHIPPED | OUTPATIENT
Start: 2020-03-30 | End: 2020-03-30

## 2020-03-30 RX ORDER — HYDRALAZINE HYDROCHLORIDE 100 MG/1
100 TABLET, FILM COATED ORAL EVERY 8 HOURS
Qty: 90 TABLET | Refills: 11 | Status: SHIPPED | OUTPATIENT
Start: 2020-03-30 | End: 2020-03-30

## 2020-03-30 RX ORDER — ISOSORBIDE DINITRATE 20 MG/1
20 TABLET ORAL 3 TIMES DAILY
Qty: 90 TABLET | Refills: 11 | Status: SHIPPED | OUTPATIENT
Start: 2020-03-30 | End: 2023-04-13

## 2020-03-30 RX ORDER — AMLODIPINE BESYLATE 10 MG/1
10 TABLET ORAL DAILY
Qty: 30 TABLET | Refills: 11 | Status: SHIPPED | OUTPATIENT
Start: 2020-03-31 | End: 2020-03-30

## 2020-03-30 RX ADMIN — ISOSORBIDE DINITRATE 20 MG: 10 TABLET ORAL at 12:03

## 2020-03-30 RX ADMIN — INSULIN ASPART 8 UNITS: 100 INJECTION, SOLUTION INTRAVENOUS; SUBCUTANEOUS at 11:03

## 2020-03-30 RX ADMIN — HYDRALAZINE HYDROCHLORIDE 100 MG: 50 TABLET, FILM COATED ORAL at 12:03

## 2020-03-30 RX ADMIN — CARVEDILOL 25 MG: 25 TABLET, FILM COATED ORAL at 08:03

## 2020-03-30 RX ADMIN — INSULIN ASPART 5 UNITS: 100 INJECTION, SOLUTION INTRAVENOUS; SUBCUTANEOUS at 11:03

## 2020-03-30 RX ADMIN — ISOSORBIDE DINITRATE 20 MG: 10 TABLET ORAL at 04:03

## 2020-03-30 RX ADMIN — AMLODIPINE BESYLATE 10 MG: 10 TABLET ORAL at 08:03

## 2020-03-30 RX ADMIN — HYDRALAZINE HYDROCHLORIDE 100 MG: 50 TABLET, FILM COATED ORAL at 04:03

## 2020-03-30 RX ADMIN — ENOXAPARIN SODIUM 40 MG: 100 INJECTION SUBCUTANEOUS at 08:03

## 2020-03-30 RX ADMIN — INSULIN ASPART 5 UNITS: 100 INJECTION, SOLUTION INTRAVENOUS; SUBCUTANEOUS at 07:03

## 2020-03-30 RX ADMIN — INSULIN ASPART 6 UNITS: 100 INJECTION, SOLUTION INTRAVENOUS; SUBCUTANEOUS at 07:03

## 2020-03-30 NOTE — NURSING
Pt education done. Pt verbalized understanding. Left IJ removed. Pt tolerated well. Awaiting MD and work/school notes before discharge.

## 2020-03-30 NOTE — PT/OT/SLP PROGRESS
Physical Therapy Treatment    Patient Name:  Riri Gonsalves   MRN:  [id number]    Recommendations:     Discharge Recommendations:  home health PT   Discharge Equipment none  Assessment:     Riri Gonsalves is a 29 y.o. female admitted with a medical diagnosis of Acute respiratory failure.  She presents with the following impairments/functional limitations:  weakness, impaired endurance, impaired self care skills, gait instability, impaired functional mobilty, impaired balance, decreased lower extremity function, decreased upper extremity function .Pt presents with improved overall functional mobility requiring decreased assistance and increased activity tolerance to perform functional mobility.Pt would continue to benefit from skilled PT to address overall functional mobility and goals. Goals remain appropriate      Rehab Prognosis: Good; patient would benefit from acute skilled PT services to address these deficits and reach maximum level of function.    Recent Surgery: * No surgery found *      Plan:     During this hospitalization, patient to be seen 5 x/week to address the identified rehab impairments via gait training, therapeutic activities, therapeutic exercises and progress toward the following goals:    · Plan of Care Expires:  04/24/20    Subjective     Patient/Family Comments/goals: I am leaving today  Pain/Comfort:  · Pain Rating 1: 0/10  · Pain Rating Post-Intervention 1: 0/10      Objective:     Communicated with RN prior to session.  Patient found up in chair with telemetry upon PT entry to room.     General Precautions: Standard, fall, aspiration airborne, contact, droplet  Orthopedic Precautions:N/A   Braces: N/A     Functional Mobility:  · Transfers:     · Sit to Stand:  stand by assistance with no AD  · Gait: pt amb 30 ft in room with no AD with SBA for safety      AM-PAC 6 CLICK MOBILITY  Turning over in bed (including adjusting bedclothes, sheets and blankets)?: 2  Sitting down on and standing up  from a chair with arms (e.g., wheelchair, bedside commode, etc.): 3  Moving from lying on back to sitting on the side of the bed?: 2  Moving to and from a bed to a chair (including a wheelchair)?: 2  Need to walk in hospital room?: 1  Climbing 3-5 steps with a railing?: 1  Basic Mobility Total Score: 11       Therapeutic Activities and Exercises:   educated patient on progress, safety,d/c,PT POC, on the effects of prolonged immobility and the importance of performing OOB activity and exercises to promote healing and reduce recovery time   Patient performed therex X 1 reps seated AP, LAQ, and standing Hip Flexion, Hip Abd/Add , heel raises, knee flexion with facilitation for correct performance and sequencing. Exercises performed to develop and maintain pt's strength, endurance and flexibility.   Updated white board with appropriate PT mobility information for medical team notification  Bedside table in front of patient and area set up for function, convenience, and safety. RN aware of patient's mobility needs and status. Questions/concerns addressed within PTA scope of practice; patient with no further questions. Time was provided for active listening, discussion of health disposition, and discussion of safe discharge. Pt?verbalized?agreement       Patient left up in chair with all lines intact, call button in reach and nsg notified..    GOALS:   Multidisciplinary Problems     Physical Therapy Goals        Problem: Physical Therapy Goal    Goal Priority Disciplines Outcome Goal Variances Interventions   Physical Therapy Goal     PT, PT/OT Ongoing, Progressing     Description:  Goals to be met by: 20    Patient will increase functional independence with mobility by performin. Supine to sit with MInimal Assistance -not met  2. Sit to stand transfer with Minimal Assistance -not met  3. Gait  x 100 feet with Contact Guard Assistance using AD if needed. -not met  4. Ascend/descend 8 stair with left Handrails  Contact Guard Assistance - not met  5. Lower extremity exercise program x15 reps  with assistance as needed -not met                       Time Tracking:     PT Received On: 03/30/20  PT Start Time: 1115     PT Stop Time: 1154  PT Total Time (min): 39 min     Billable Minutes: Gait Training 10, Therapeutic Activity 14 and Therapeutic Exercise 15    Treatment Type: Treatment  PT/PTA: PTA     PTA Visit Number: 1     Zeeshan Lopez, PTA  03/30/2020

## 2020-03-30 NOTE — PLAN OF CARE
[facility]    HOME HEALTH ORDERS  FACE TO FACE ENCOUNTER    Patient Name: Riri Gonsalves  YOB: 1990    PCP: Jocelynn Brooks MD   PCP Address: [address] [facility] [address]  PCP Phone Number: [phone]  PCP Fax: [phone]    Encounter Date: 03/30/2020    Admit to Home Health     Diagnoses:  Active Hospital Problems    Diagnosis  POA    Type 2 diabetes mellitus without complication, without long-term current use of insulin [E11.9]  Yes     -180 goal remains, BGs ranging from 200s to 190s    Plan: D/C insulin drip, continue SQ insulin 40U BD, watch K      Essential hypertension [I10]  Yes      Resolved Hospital Problems    Diagnosis Date Resolved POA    *Acute respiratory failure with CoVID19 + [J96.00] 03/30/2020 Yes    Hypokalemia [E87.6] 03/30/2020 Yes    Elevated lipase [R74.8] 03/30/2020 Yes       No future appointments.  Follow-up Information     Jocelynn Brooks MD. Go on 5/7/2020.    Specialty:  Internal Medicine  Why:  Appointment time: 8:45AM  Contact information:  [address] [facility] [place] [id number] [phone]                     I have seen and examined this patient face to face today. My clinical findings that support the need for the home health skilled services and home bound status are the following:  Weakness/numbness causing balance and gait disturbance due to Infection, Weakness/Debility and Anemia making it taxing to leave home.    Allergies:  Review of patient's allergies indicates:   Allergen Reactions    Zofran [ondansetron hcl (pf)] Other (See Comments)     Chest burns       Diet: diabetic diet: 2000 calorie    Activities: activity as tolerated    Nursing:   SN to complete comprehensive assessment including routine vital signs. Instruct on disease process and s/s of complications to report to MD. Review/verify medication list sent home with the patient at time of discharge  and instruct patient/caregiver as needed. Frequency may  be adjusted depending on start of care date.    Notify MD if SBP > 160 or < 90; DBP > 90 or < 50; HR > 120 or < 50; Temp > 101      CONSULTS:    Physical Therapy to evaluate and treat. Evaluate for home safety and equipment needs; Establish/upgrade home exercise program. Perform / instruct on therapeutic exercises, gait training, transfer training, and Range of Motion.  Occupational Therapy to evaluate and treat. Evaluate home environment for safety and equipment needs. Perform/Instruct on transfers, ADL training, ROM, and therapeutic exercises.    MISCELLANEOUS CARE:  Diabetic Care:   SN to perform and educate Diabetic management with blood glucose monitoring: and Fingerstick blood sugar a.m. and p.m.    WOUND CARE ORDERS  n/a      Medications: Review discharge medications with patient and family and provide education.      Current Discharge Medication List      START taking these medications    Details   amLODIPine (NORVASC) 10 MG tablet Take 1 tablet (10 mg total) by mouth once daily.  Qty: 30 tablet, Refills: 11      carvediloL (COREG) 25 MG tablet Take 1 tablet (25 mg total) by mouth 2 (two) times daily.  Qty: 60 tablet, Refills: 11      hydrALAZINE (APRESOLINE) 100 MG tablet Take 1 tablet (100 mg total) by mouth every 8 (eight) hours.  Qty: 90 tablet, Refills: 11      isosorbide dinitrate (ISORDIL) 20 MG tablet Take 1 tablet (20 mg total) by mouth 3 (three) times daily.  Qty: 90 tablet, Refills: 11         CONTINUE these medications which have NOT CHANGED    Details   blood sugar diagnostic (ACCU-CHEK MARI PLUS TEST STRP) Strp 1 strip by Misc.(Non-Drug; Combo Route) route 3 (three) times daily before meals.  Qty: 100 each, Refills: 6    Comments: ICD 10:  E11.9      dulaglutide (TRULICITY) 0.75 mg/0.5 mL PnIj Inject 0.75 mg into the skin every 7 days.      etonogestrel (NEXPLANON) 68 mg Impl by Subdermal route.      metformin (GLUCOPHAGE) 500 MG tablet Take 1 tablet (500 mg total) by mouth 2 (two) times  daily.  Qty: 180 tablet, Refills: 1      metoclopramide HCl (REGLAN) 10 MG tablet Take 1 tablet (10 mg total) by mouth every 6 (six) hours.  Qty: 30 tablet, Refills: 0      promethazine (PHENERGAN) 25 MG tablet Take 1 tablet (25 mg total) by mouth every 6 (six) hours as needed for Nausea.  Qty: 20 tablet, Refills: 1      senna-docusate 8.6-50 mg (PERICOLACE) 8.6-50 mg per tablet Take 2 tablets by mouth 2 (two) times daily.      topiramate (TOPAMAX) 50 MG tablet Take 1 tablet (50 mg total) by mouth 2 (two) times daily.  Qty: 60 tablet, Refills: 11    Associated Diagnoses: Pseudotumor cerebri         STOP taking these medications       acetaZOLAMIDE (DIAMOX) 500 mg CpSR Comments:   Reason for Stopping:         furosemide (LASIX) 40 MG tablet Comments:   Reason for Stopping:         HYDROcodone-acetaminophen (NORCO) 5-325 mg per tablet Comments:   Reason for Stopping:         HYDROcodone-acetaminophen (NORCO) 5-325 mg per tablet Comments:   Reason for Stopping:         lisinopril (PRINIVIL,ZESTRIL) 20 MG tablet Comments:   Reason for Stopping:         naproxen (NAPROSYN) 500 MG tablet Comments:   Reason for Stopping:         potassium chloride (KLOR-CON) 10 MEQ TbSR Comments:   Reason for Stopping:               I certify that this patient is confined to her home and needs intermittent skilled nursing care, physical therapy and occupational therapy.

## 2020-03-30 NOTE — PT/OT/SLP PROGRESS
Occupational Therapy   Treatment    Name: Riri Gonsalves  MRN: [id number]  Admitting Diagnosis:  Acute respiratory failure       Recommendations:     Discharge Recommendations: home health OT      Assessment:     Riri Gonsalves is a 29 y.o. female with a medical diagnosis of Acute respiratory failure.  She presents with good participation and motivation.  Pt with greatly improved overall mobility, endurance, & standing balance on this date. Performance deficits affecting function are weakness, impaired endurance, impaired self care skills, impaired functional mobilty, impaired balance, decreased upper extremity function, decreased lower extremity function.     Rehab Prognosis:  Good; patient would benefit from acute skilled OT services to address these deficits and reach maximum level of function.       Plan:     Patient to be seen 5 x/week to address the above listed problems via self-care/home management, therapeutic activities, therapeutic exercises  · Plan of Care Expires:    · Plan of Care Reviewed with: patient    Subjective     Pain/Comfort:  · Pain Rating 1: 0/10  · Pain Rating Post-Intervention 1: 0/10    Objective:     Communicated with: RN prior to session.  Patient found up in chair with telemetry upon OT entry to room. No family present.    General Precautions: Standard, aspiration, fall, airborne, contact, droplet    Occupational Performance:       Functional Mobility/Transfers:  · Patient completed Sit <> Stand Transfer with stand by assistance  with  no assistive device   · Patient completed Toilet Transfer Stand Pivot technique with stand by assistance with  no AD  · Functional Mobility: SBA with functional mobility in room during ADL's    Activities of Daily Living:  · Grooming: stand by assistance standing at sink to wash hands & brush teeth (pt with mild decreased balance noted requiring SBA for safety)  · Toileting: stand by assistance from standard commode      [facility] 6 Click ADL: 19    Treatment  & Education:  Pt performed AROM exercises with BUE in 3 planes x 10 reps each while seated in chair.  Provided education on therex for BUE & BLE to assist with increased endurance & ways to utilize common household items as weights (if no weights are available) at home during therex with BUE to assist with strengthening.  Provided education on techniques to increase strength of BLE to facilitate ease of transferring in & out of bathtub for standing showers.  Provided education on safety & need for SBA with functional mobility currently upon discharge home.  Pt reported that sister would provide this assistance & family was going to bring her a walker to use when sister is not home.  Pt verablized understanding of all information/education provided.  Pt had no further questions & when asked whether there were any concerns pt reported none.      Patient left up in chair with all lines intact, call button in reach and RN. notifiedEducation:      GOALS:   Multidisciplinary Problems     Occupational Therapy Goals        Problem: Occupational Therapy Goal    Goal Priority Disciplines Outcome Interventions   Occupational Therapy Goal     OT, PT/OT Ongoing, Progressing    Description:  Goals to be met by: 7 days 4/3/2020     Patient will increase functional independence with ADLs by performing:    Pt to complete UE dressing with MIN A - met  Pt to complete LE dressing with MIN A  - not met  Pt to complete toileting with MIN A - met  Pt to t/f to BSC with MIN A - met  Pt to tolerated OOB to chair x 3-4 hours daily to increase endurance for functional activity. - met  Pt to demo independence with UE HEP to increase functional strength - not met                       Time Tracking:     OT Date of Treatment: 03/30/20  OT Start Time: 0902  OT Stop Time: 0930  OT Total Time (min): 28 min    Billable Minutes:Self Care/Home Management 18  Therapeutic Exercise 10    BRAXTON Gorman  3/30/2020

## 2020-03-30 NOTE — PLAN OF CARE
03/30/2020      Riri Gonsalves  [address] [facility] [address]          Hospital Medicine Dept.  [facility] [address] [place] [id number] [phone] after hours  [phone] fax Riri Gonsalves has been hospitalized at the [facility] since 3/17/2020.  Patient was discharged on 3/30/2020. Please excuse the patient from school and her employment duties during her hospitalization.  Patient may return on 4/6/2020.  No restrictions.     Please contact me if you have any questions.                  __________________________  Aleksandr Dumont MD  03/30/2020

## 2020-03-30 NOTE — PLAN OF CARE
Problem: Physical Therapy Goal  Goal: Physical Therapy Goal  Description  Goals to be met by: 20    Patient will increase functional independence with mobility by performin. Supine to sit with MInimal Assistance -not met  2. Sit to stand transfer with Minimal Assistance -not met  3. Gait  x 100 feet with Contact Guard Assistance using AD if needed. -not met  4. Ascend/descend 8 stair with left Handrails Contact Guard Assistance - not met  5. Lower extremity exercise program x15 reps  with assistance as needed -not met      3/30/2020 1426 by Zeeshan Lopez PTA  Outcome: Ongoing, Progressing  3/30/2020 1426 by Zeeshan Lopez PTA  Reactivated   Pt progressing towards goals. continue with PT POC.Goals remain appropriate.  Zeeshan Lopez PTA

## 2020-03-30 NOTE — DISCHARGE SUMMARY
Discharge Summary  Hospital Medicine  [facility]      Attending Physician on Discharge: Aleksandr Dumont MD  [facility] Medicine Team: [facility] HOSP MED G  Date of Admission:  3/17/2020     Date of Discharge:      Active Hospital Problems    Diagnosis  POA    Type 2 diabetes mellitus without complication, without long-term current use of insulin [E11.9]  Yes     -180 goal remains, BGs ranging from 200s to 190s    Plan: D/C insulin drip, continue SQ insulin 40U BD, watch K      Essential hypertension [I10]  Yes      Resolved Hospital Problems    Diagnosis Date Resolved POA    *Acute respiratory failure with CoVID19 + [J96.00] 03/30/2020 Yes    Hypokalemia [E87.6] 03/30/2020 Yes    Elevated lipase [R74.8] 03/30/2020 Yes       Consults: Infectious Disease      History of Present Illness:  Riri Gonsalves is a 29yoF w/ PMH DM2, HTN, and benign intracranial HTN who presented to [facility] on 3/13 complaining of shortness of breath, fevers, and cough. 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 According to notes from OSH pt had rhinorrhea & dry cough for several days- went to urgent care where she was told she had flu-like symptoms & was discharged home on amoxicillin. A few days later she was continuing to have fevers as high as 103 & was having worsening SOB at rest- went to [facility] ED & was initially feeling better on 2L NC. Labs on presentation were CRP 18.2, WBC 3.7, flu negative. Pt was admitted to medicine & started on empiric CAP coverage. Pt's condition continued to deteriorate- worsening leukopenia, lymphopenia, elevated LDH, & CRP, & increasing oxygen requirements. CXR showed worsening multifocal airspace disease. Pt was transferred to MICU on 3/14. Pt decompensated & was intubated on 3/15 & on 3/16 results came back positive for Covid 19. Echo showed normal LV function (EF 60%). Pt was proned on 3/16. Pt was supinated prior to transfer. Pt presents as transfer from [facility] ICU  for possible ecmo.    Hospital Course:     Riri Gonsalves was admitted to [facility] Medicine for treatment of suspected COVID-19 viral infection and was treated with supportive care following a comprehensive physical, radiographic, and lab evaluation tailored to the current standard of care for COVID19. Please review the admission H&P and the studies listed below for details. She improved with supportive care and was found to be suitable for discharge home without oxygen.    Additional details of the hospitalization include:    Acute respiratory failure- resolved   - ARDS 2/2 positive COVID 19 (tested at [facility])   - On transfer: , ferritin 368, D-Dimer >33. CRP down trending (353 at admit, 22.4 on 3/24  - Completed Plaquenil, azithromycin and ceftriaxone  - Discontinued remdezivir prior to discharge on Day7/9; daily labs to monitor include CBC, CMP, PT/INR, aPTT, UA ordered  - s/p Extubation to BiPAP and well tolerated; transitioned to nasal cannula  - Methylprednisolone taper completed          - discontinued Lasix  - weaned from 4 L to room air     Essential hypertension  - continue hydralazine, coreg, amlodipine and isosorbide dinitrate     Hypokalemia  - Likely due to diuresis with lasix, lasix discontinue  - replete lytes prn      Type 2 diabetes mellitus without complication, without long-term current use of insulin  - Insulin dependent type 2 DM  - HA1c: 12.4  - SQ 22U detemir BID, Aspart TID while inpatient   - SSI   - POCT Glucose ACHS   - discharge on home Trulicity and metformin      Elevated lipase- improving   - TGs 800s last week at 570s few days ago 2/2 propofol use (off for over 4d)  - No clinical signs of pancreatitis     Debility  - PT/OT rec SNF but patient prefers HH    On the day of discharge, isolation precautions were reviewed at length verbally. The patient was also provided with written isolation guidelines modified from the [facility] as well as the  [facility], as part of discharge paperwork. An updated phone number was obtained, which will be used to contact the patient when results are available, and positive patients will be enrolled in both the COVID-19 Home Symptom Monitoring program.    Laboratory Values:  No results found for: [id number]    Recent Labs   Lab 03/28/20  0500 03/29/20  0300 03/30/20  0845   WBC 8.65 6.52 5.50   LYMPH 27.2  2.4 31.6  2.1 27.6  1.5   HGB 10.4* 9.3* 9.4*   HCT 35.2* 32.0* 32.2*    286 343     Recent Labs   Lab 03/25/20  0413  03/27/20  0626 03/28/20  0500 03/29/20  0300 03/30/20  0845      < > 137 138 138 137   K 2.8*   < > 3.3* 3.3* 3.6 3.7   CL 96   < > 97 98 103 103   CO2 35*   < > 29 31* 28 27   BUN 20   < > 13 11 12 7   CREATININE 0.7   < > 0.7 0.8 0.8 0.8   *   < > 165* 206* 296* 322*   CALCIUM 9.2   < > 8.7 8.7 8.8 8.5*   MG 1.7   < > 2.0 2.1 1.9 1.9   PHOS 3.8   < > 3.1 3.4 4.2 2.9   LIPASE 359*  --  291*  --   --  370*    < > = values in this interval not displayed.     Recent Labs   Lab 03/28/20  0500 03/29/20 0300 03/30/20  0845   ALKPHOS 60 66 60   ALT 33 27 25   AST 26 22 17   ALBUMIN 2.7* 2.3* 2.7*   PROT 6.5 5.7* 6.2   BILITOT 0.6 0.4 0.4   INR 1.1 1.0 1.0        Recent Labs     03/28/20  0500 03/29/20  0300 03/30/20  0845   FERRITIN  --   --  218   CRP 64.0* 47.4* 44.2*         Microbiology:  Microbiology Results (last 7 days)     Procedure Component Value Units Date/Time    Blood culture [[id number]] Collected:  03/19/20 2300    Order Status:  Completed Specimen:  Blood from Peripheral, Forearm, Left Updated:  03/25/20 0612     Blood Culture, Routine No growth after 5 days.    Blood culture [[id number]] Collected:  03/19/20 2300    Order Status:  Completed Specimen:  Blood from Peripheral, Hand, Left Updated:  03/25/20 0612     Blood Culture, Routine No growth after 5 days.          Imaging:  CXR:  FINDINGS:  Endotracheal tube terminates in the mid trachea.  Nasogastric tube terminates just  beneath the EG junction.  Right IJ catheter terminates in the SVC.  There is interval development of multifocal bilateral consolidation throughout both lungs.  No pleural effusion or pneumothorax.      Impression       1. Interval development of multifocal bilateral consolidation.         Cardiac:  EKG: Sinus tachycardia    No results found for this or any previous visit.      Procedures:   * No surgery found *        Current Discharge Medication List      START taking these medications    Details   amLODIPine (NORVASC) 10 MG tablet Take 1 tablet (10 mg total) by mouth once daily.  Qty: 30 tablet, Refills: 11      carvediloL (COREG) 25 MG tablet Take 1 tablet (25 mg total) by mouth 2 (two) times daily.  Qty: 60 tablet, Refills: 11      hydrALAZINE (APRESOLINE) 100 MG tablet Take 1 tablet (100 mg total) by mouth every 8 (eight) hours.  Qty: 90 tablet, Refills: 11      isosorbide dinitrate (ISORDIL) 20 MG tablet Take 1 tablet (20 mg total) by mouth 3 (three) times daily.  Qty: 90 tablet, Refills: 11         CONTINUE these medications which have NOT CHANGED    Details   blood sugar diagnostic (ACCU-CHEK MARI PLUS TEST STRP) Strp 1 strip by Misc.(Non-Drug; Combo Route) route 3 (three) times daily before meals.  Qty: 100 each, Refills: 6    Comments: ICD 10:  E11.9      dulaglutide (TRULICITY) 0.75 mg/0.5 mL PnIj Inject 0.75 mg into the skin every 7 days.      etonogestrel (NEXPLANON) 68 mg Impl by Subdermal route.      metformin (GLUCOPHAGE) 500 MG tablet Take 1 tablet (500 mg total) by mouth 2 (two) times daily.  Qty: 180 tablet, Refills: 1      metoclopramide HCl (REGLAN) 10 MG tablet Take 1 tablet (10 mg total) by mouth every 6 (six) hours.  Qty: 30 tablet, Refills: 0      promethazine (PHENERGAN) 25 MG tablet Take 1 tablet (25 mg total) by mouth every 6 (six) hours as needed for Nausea.  Qty: 20 tablet, Refills: 1         STOP taking these medications       acetaZOLAMIDE (DIAMOX) 500 mg CpSR Comments:   Reason for  Stopping:         furosemide (LASIX) 40 MG tablet Comments:   Reason for Stopping:         HYDROcodone-acetaminophen (NORCO) 5-325 mg per tablet Comments:   Reason for Stopping:         HYDROcodone-acetaminophen (NORCO) 5-325 mg per tablet Comments:   Reason for Stopping:         lisinopril (PRINIVIL,ZESTRIL) 20 MG tablet Comments:   Reason for Stopping:         naproxen (NAPROSYN) 500 MG tablet Comments:   Reason for Stopping:         potassium chloride (KLOR-CON) 10 MEQ TbSR Comments:   Reason for Stopping:                 Discharge Diet:diabetic diet: 2000 calorie with Normal Fluid intake of 1500 - 2000 mL per day    Activity: activity as tolerated    Discharge Condition: Good    [place]position[place] [facility]    Follow up:    Follow-up Information     Jocelynn Brooks MD. Go on 5/7/2020.    Specialty:  Internal Medicine  Why:  Appointment time: 8:45AM  Contact information:  [address] [facility] [id number] [phone]                   Tests pending at the time of discharge:         Time spent  on the discharge of the patient including review of hospital course with the patient. reviewing discharge medications and arranging follow-up care: > 45 mins    Discharge examination: Patient was seen and examined on the date of discharge and determined to be suitable for discharge.      Aleksandr Dumont MD  Hospital Medicine Staff  Pager: [phone]; Spectra: [phone]

## 2020-03-30 NOTE — PLAN OF CARE
Assessment as documented.  Pt on RA overnight without issue. Per pt plan for d/c home with home health on Monday or Tuesday.  Pt with questions re: insurance and discharge planning, social work consult placed per pt's request.     Plan: continue to work towards safe discharge, promote pt's mobility and independence with iADLS.  Will notify medical team of any changes in pt status.

## 2020-03-30 NOTE — DISCHARGE INSTRUCTIONS
Your test was POSITIVE for COVID-19 (coronavirus).     [facility]  Preventing the Spread of Coronavirus Disease 2019 in Homes and Residential Communities      Prevention steps for people with confirmed or suspected COVID-19 (including persons under investigation) who do not need to be hospitalized and people with confirmed COVID-19 who were hospitalized and determined to be medically stable to go home.    Your healthcare provider and public health staff will evaluate whether you can be cared for at home.   Stay home except to get medical care.   Separate yourself from other people and animals in your home   Call ahead before visiting your doctor.   Wear a facemask.   Cover your coughs and sneezes.   Clean your hands often.   Avoid sharing personal household items.   Clean all high-touch surfaces every day.   Monitor your symptoms. Seek prompt medical attention if your illness is worsening (e.g., difficulty breathing). Before seeking care, call your healthcare provider.   If you have a medical emergency and need to call 911, notify the dispatch personnel that you have, or are being evaluated for COVID-19. If possible, put on a facemask before emergency medical services arrive.   Discontinuing home isolation. Call your provider about guidance to discontinue home isolation.    Recommended precautions for household members, intimate partners, and caregivers in a nonhealthcare setting of a patient with symptomatic laboratory-confirmed COVID-19 or a patient under investigation.  Household members, intimate partners, and caregivers in a nonhealthcare setting may have close contact with a person with symptomatic, laboratory-confirmed COVID-19 or a person under investigation. Close contacts should monitor their health; they should call their healthcare provider right away if they develop symptoms suggestive of COVID-19 (e.g., fever, cough, shortness of breath).    Close contacts  should also follow these recommendations:   Make sure that you understand and can help the patient follow their healthcare provider's instructions for medication(s) and care. You should help the patient with basic needs in the home and provide support for getting groceries, prescriptions, and other personal needs.   Monitor the patient's symptoms. If the patient is getting sicker, call his or her healthcare provider and tell them that the patient has laboratory-confirmed COVID-19. This will help the healthcare provider's office take steps to keep other people in the office or waiting room from getting infected. Ask the healthcare provider to call the local or [facility] health department for additional guidance. If the patient has a medical emergency and you need to call 911, notify the dispatch personnel that the patient has, or is being evaluated for COVID-19.   Household members should stay in another room or be  from the patient as much as possible. Household members should use a separate bedroom and bathroom, if available.   Prohibit visitors who do not have an essential need to be in the home.   Household members should care for any pets in the home. Do not handle pets or other animals while sick.   Make sure that shared spaces in the home have good air flow, such as by an air conditioner or an opened window, weather permitting.   Perform hand hygiene frequently. Wash your hands often with soap and water for at least 20 seconds or use an alcohol-based hand  that contains 60 to 95% alcohol, covering all surfaces of your hands and rubbing them together until they feel dry. Soap and water should be used preferentially if hands are visibly dirty.   Avoid touching your eyes, nose, and mouth with unwashed hands.   The patient should wear a facemask when you are around other people. If the patient is not able to wear a facemask (for example, because it causes trouble breathing), you, as the  caregiver should wear a mask when you are in the same room as the patient.   Wear a disposable facemask and gloves when you touch or have contact with the patient's blood, stool, or body fluids, such as saliva, sputum, nasal mucus, vomit, urine.  o Throw out disposable facemasks and gloves after using them. Do not reuse.  o When removing personal protective equipment, first remove and dispose of gloves. Then, immediately clean your hands with soap and water or alcohol-based hand . Next, remove and dispose of facemask, and immediately clean your hands again with soap and water or alcohol-based hand .   Avoid sharing household items with the patient. You should not share dishes, drinking glasses, cups, eating utensils, towels, bedding, or other items. After the patient uses these items, you should wash them thoroughly (see below Wash laundry thoroughly).   Clean all high-touch surfaces, such as counters, tabletops, doorknobs, bathroom fixtures, toilets, phones, keyboards, tablets, and bedside tables, every day. Also, clean any surfaces that may have blood, stool, or body fluids on them.   Use a household cleaning spray or wipe, according to the label instructions. Labels contain instructions for safe and effective use of the cleaning product including precautions you should take when applying the product, such as wearing gloves and making sure you have good ventilation during use of the product.   Wash laundry thoroughly.  o Immediately remove and wash clothes or bedding that have blood, stool, or body fluids on them.  o Wear disposable gloves while handling soiled items and keep soiled items away from your body. Clean your hands (with soap and water or an alcohol-based hand ) immediately after removing your gloves.  o Read and follow directions on labels of laundry or clothing items and detergent. In general, using a normal laundry detergent according to washing machine  instructions and dry thoroughly using the warmest temperatures recommended on the clothing label.   Place all used disposable gloves, facemasks, and other contaminated items in a lined container before disposing of them with other household waste. Clean your hands (with soap and water or an alcohol-based hand ) immediately after handling these items. Soap and water should be used preferentially if hands are visibly dirty.   Discuss any additional questions with your state or local health department or healthcare provider. Check available hours when contacting your local health department.    For more information see CDC link below.      https://www.cdc.gov/coronavirus/2019-ncov/hcp/guidance-prevent-spread.html#precautions              If your symptoms worsen or if you have any other concerns, please contact [facility] at [phone].     Sincerely,     Aleksandr Dumont MD

## 2020-03-30 NOTE — PLAN OF CARE
May7 Go to Jocelynn Brooks MD   Thursday May 7, 2020   Appointment time: 8:45AM  [address] [facility] [address]   [phone]               03/30/20 1514   Final Note   Assessment Type Final Discharge Note   Anticipated Discharge Disposition Home-Health   What phone number can be called within the next 1-3 days to see how you are doing after discharge? [phone]   Hospital Follow Up  Appt(s) scheduled? Yes  (Ambulatory referrals sent to Neurology and Endocrinology.)   Right Care Referral Info   Post Acute Recommendation Home-care   Facility Name [facility]   Post-Acute Status   Post-Acute Authorization Home Health  ([facility])   Home Health Status Set-up Complete   Discharge Delays None known at this time

## 2020-03-30 NOTE — PLAN OF CARE
Problem: Occupational Therapy Goal  Goal: Occupational Therapy Goal  Description  Goals to be met by: 7 days 4/3/2020     Patient will increase functional independence with ADLs by performing:    Pt to complete UE dressing with MIN A - met  Pt to complete LE dressing with MIN A  - not met  Pt to complete toileting with MIN A - met  Pt to t/f to BSC with MIN A - met  Pt to tolerated OOB to chair x 3-4 hours daily to increase endurance for functional activity. - met  Pt to demo independence with UE HEP to increase functional strength - not met      3/30/2020 1124 by BRAXTON Gorman  Outcome: Ongoing, Progressing  3/30/2020 1123 by BRAXTON Gorman  Reactivated     Goals partially met.  Will updated goals if pt not discharged on this date as planned.  BRAXTON Gorman 3/30/2020

## 2020-03-31 ENCOUNTER — TELEPHONE (OUTPATIENT)
Dept: PULMONOLOGY | Facility: CLINIC | Age: 30
End: 2020-03-31

## 2020-03-31 NOTE — TELEPHONE ENCOUNTER
I tried to call patient to offer a virtual visit with one of our providers. Patient did not answer and I could not leave a voicemail due to mailbox being full.

## 2020-04-21 DIAGNOSIS — Z01.84 ANTIBODY RESPONSE EXAMINATION: ICD-10-CM

## 2020-05-21 ENCOUNTER — TELEPHONE (OUTPATIENT)
Dept: PULMONOLOGY | Facility: CLINIC | Age: 30
End: 2020-05-21

## 2020-05-21 DIAGNOSIS — Z01.84 ANTIBODY RESPONSE EXAMINATION: ICD-10-CM

## 2020-05-21 NOTE — TELEPHONE ENCOUNTER
----- Message from Lauren Quezada sent at 5/20/2020  1:35 PM CDT -----  Contact: Case Management  Case Management   [employer]    Calling  Need date of last visit and any future visits to be sure pt was taken care of after covid 19    [facility] number    [facility] [phone]     Thanks

## 2020-06-09 ENCOUNTER — TELEPHONE (OUTPATIENT)
Dept: NEUROLOGY | Facility: CLINIC | Age: 30
End: 2020-06-09

## 2020-06-20 DIAGNOSIS — Z01.84 ANTIBODY RESPONSE EXAMINATION: ICD-10-CM

## 2020-07-20 DIAGNOSIS — Z01.84 ANTIBODY RESPONSE EXAMINATION: ICD-10-CM

## 2020-08-19 DIAGNOSIS — Z01.84 ANTIBODY RESPONSE EXAMINATION: ICD-10-CM

## 2020-09-18 DIAGNOSIS — Z01.84 ANTIBODY RESPONSE EXAMINATION: ICD-10-CM

## 2020-10-18 DIAGNOSIS — Z01.84 ANTIBODY RESPONSE EXAMINATION: ICD-10-CM

## 2020-11-05 ENCOUNTER — TELEPHONE (OUTPATIENT)
Dept: PULMONOLOGY | Facility: CLINIC | Age: 30
End: 2020-11-05

## 2020-11-05 NOTE — TELEPHONE ENCOUNTER
Called pt to notify insurance and that appointment had to be cancelled number on file not receiving call

## 2020-11-17 DIAGNOSIS — Z01.84 ANTIBODY RESPONSE EXAMINATION: ICD-10-CM

## 2020-12-29 ENCOUNTER — LAB VISIT (OUTPATIENT)
Dept: PRIMARY CARE CLINIC | Facility: OTHER | Age: 30
End: 2020-12-29
Attending: INTERNAL MEDICINE
Payer: MEDICAID

## 2020-12-29 DIAGNOSIS — Z03.818 ENCOUNTER FOR OBSERVATION FOR SUSPECTED EXPOSURE TO OTHER BIOLOGICAL AGENTS RULED OUT: ICD-10-CM

## 2020-12-29 PROCEDURE — U0003 INFECTIOUS AGENT DETECTION BY NUCLEIC ACID (DNA OR RNA); SEVERE ACUTE RESPIRATORY SYNDROME CORONAVIRUS 2 (SARS-COV-2) (CORONAVIRUS DISEASE [COVID-19]), AMPLIFIED PROBE TECHNIQUE, MAKING USE OF HIGH THROUGHPUT TECHNOLOGIES AS DESCRIBED BY CMS-2020-01-R: HCPCS

## 2020-12-30 LAB — SARS-COV-2 RNA RESP QL NAA+PROBE: NOT DETECTED

## 2021-08-25 ENCOUNTER — LAB VISIT (OUTPATIENT)
Dept: LAB | Facility: OTHER | Age: 31
End: 2021-08-25
Payer: MEDICAID

## 2021-08-25 DIAGNOSIS — Z20.822 ENCOUNTER FOR LABORATORY TESTING FOR COVID-19 VIRUS: ICD-10-CM

## 2021-08-25 PROCEDURE — U0003 INFECTIOUS AGENT DETECTION BY NUCLEIC ACID (DNA OR RNA); SEVERE ACUTE RESPIRATORY SYNDROME CORONAVIRUS 2 (SARS-COV-2) (CORONAVIRUS DISEASE [COVID-19]), AMPLIFIED PROBE TECHNIQUE, MAKING USE OF HIGH THROUGHPUT TECHNOLOGIES AS DESCRIBED BY CMS-2020-01-R: HCPCS | Performed by: NURSE PRACTITIONER

## 2021-08-27 LAB — SARS-COV-2 RNA RESP QL NAA+PROBE: NOT DETECTED

## 2022-10-07 ENCOUNTER — TELEPHONE (OUTPATIENT)
Dept: NEUROLOGY | Facility: CLINIC | Age: 32
End: 2022-10-07
Payer: COMMERCIAL

## 2022-10-10 ENCOUNTER — OFFICE VISIT (OUTPATIENT)
Dept: NEUROLOGY | Facility: CLINIC | Age: 32
End: 2022-10-10
Payer: COMMERCIAL

## 2022-10-10 VITALS
WEIGHT: 293 LBS | DIASTOLIC BLOOD PRESSURE: 98 MMHG | SYSTOLIC BLOOD PRESSURE: 152 MMHG | BODY MASS INDEX: 41.02 KG/M2 | HEIGHT: 71 IN | HEART RATE: 95 BPM

## 2022-10-10 DIAGNOSIS — G93.2 PSEUDOTUMOR CEREBRI: ICD-10-CM

## 2022-10-10 DIAGNOSIS — G40.909 SEIZURE DISORDER: Primary | ICD-10-CM

## 2022-10-10 PROCEDURE — 3008F BODY MASS INDEX DOCD: CPT | Mod: CPTII,S$GLB,, | Performed by: PSYCHIATRY & NEUROLOGY

## 2022-10-10 PROCEDURE — 3080F PR MOST RECENT DIASTOLIC BLOOD PRESSURE >= 90 MM HG: ICD-10-PCS | Mod: CPTII,S$GLB,, | Performed by: PSYCHIATRY & NEUROLOGY

## 2022-10-10 PROCEDURE — 1159F MED LIST DOCD IN RCRD: CPT | Mod: CPTII,S$GLB,, | Performed by: PSYCHIATRY & NEUROLOGY

## 2022-10-10 PROCEDURE — 99203 PR OFFICE/OUTPT VISIT, NEW, LEVL III, 30-44 MIN: ICD-10-PCS | Mod: S$GLB,,, | Performed by: PSYCHIATRY & NEUROLOGY

## 2022-10-10 PROCEDURE — 3077F PR MOST RECENT SYSTOLIC BLOOD PRESSURE >= 140 MM HG: ICD-10-PCS | Mod: CPTII,S$GLB,, | Performed by: PSYCHIATRY & NEUROLOGY

## 2022-10-10 PROCEDURE — 3080F DIAST BP >= 90 MM HG: CPT | Mod: CPTII,S$GLB,, | Performed by: PSYCHIATRY & NEUROLOGY

## 2022-10-10 PROCEDURE — 1160F PR REVIEW ALL MEDS BY PRESCRIBER/CLIN PHARMACIST DOCUMENTED: ICD-10-PCS | Mod: CPTII,S$GLB,, | Performed by: PSYCHIATRY & NEUROLOGY

## 2022-10-10 PROCEDURE — 99999 PR PBB SHADOW E&M-EST. PATIENT-LVL IV: CPT | Mod: PBBFAC,,, | Performed by: PSYCHIATRY & NEUROLOGY

## 2022-10-10 PROCEDURE — 3077F SYST BP >= 140 MM HG: CPT | Mod: CPTII,S$GLB,, | Performed by: PSYCHIATRY & NEUROLOGY

## 2022-10-10 PROCEDURE — 1160F RVW MEDS BY RX/DR IN RCRD: CPT | Mod: CPTII,S$GLB,, | Performed by: PSYCHIATRY & NEUROLOGY

## 2022-10-10 PROCEDURE — 1159F PR MEDICATION LIST DOCUMENTED IN MEDICAL RECORD: ICD-10-PCS | Mod: CPTII,S$GLB,, | Performed by: PSYCHIATRY & NEUROLOGY

## 2022-10-10 PROCEDURE — 99203 OFFICE O/P NEW LOW 30 MIN: CPT | Mod: S$GLB,,, | Performed by: PSYCHIATRY & NEUROLOGY

## 2022-10-10 PROCEDURE — 3008F PR BODY MASS INDEX (BMI) DOCUMENTED: ICD-10-PCS | Mod: CPTII,S$GLB,, | Performed by: PSYCHIATRY & NEUROLOGY

## 2022-10-10 PROCEDURE — 99999 PR PBB SHADOW E&M-EST. PATIENT-LVL IV: ICD-10-PCS | Mod: PBBFAC,,, | Performed by: PSYCHIATRY & NEUROLOGY

## 2022-10-10 NOTE — PROGRESS NOTES
[facility] NEUROLOGY  [facility], [place]    Date: 10/10/22  Patient Name: Riri Gonsalves   MRN: [id number]   PCP: Jocelynn Brooks  Referring Provider: Self, Aaareferral    Chief Complaint:  History of IIH, history of seizure disorder  Subjective:        HPI:   Ms. Riri Gonsalves is a 32 y.o. female presenting to [facility] care for history of idiopathic intracranial hypertension and history of seizure disorder.  Past medical history as below including hypertension, diabetes.    The patient shares that she began having seizures very early in her childhood, around age 6 or 7.  She had several seizures over the years that were well controlled with medication.  Eventually wean from medication successfully.  No seizure activity since 2016.  Previous seizures described as generalized tonic clonic.  Denies significant findings on workup in the past.  Stable with no new concerns.    Discussed history of IIH.  On chart review, the patient underwent lumbar puncture in 2016 which found an opening pressure of 44.  Also noted in the documentation be suffering with 3+ papilledema bilaterally.  She was started on Diamox and topiramate.  This along with mild weight loss saw resolution of symptoms.  The patient continues to have yearly eye exams with the most recent being approximately 2 months prior to today's visit.  She reports no findings of papilledema in the last several years.  She has been off medication for more than 2 years.  Cognizant of her weight and is knowledgeable as to how all weight loss can affect the condition.  States that headaches are very infrequent and mostly tension-type at this point.  She denies any headaches in the last 3-6 months that involve light/sound sensitivity or nausea/vomiting like does she experience during initial phase of workup for IIH.  Lower level headaches described are responsive to OTC analgesics.    No other neurological complaints at this time.    PAST MEDICAL  HISTORY:  Past Medical History:   Diagnosis Date    Diabetes mellitus     Hypertension     IIH (idiopathic intracranial hypertension)     Seizure     remote Hx of seizure disorder      Patient Active Problem List   Diagnosis    Seizure disorder    Essential hypertension    Type 2 diabetes mellitus without complication, without long-term current use of insulin    Pseudotumor cerebri    Hidradenitis suppurativa    Benign intracranial hypertension    Non-intractable vomiting with nausea     PAST SURGICAL HISTORY:  Past Surgical History:   Procedure Laterality Date    URETHRA SURGERY         CURRENT MEDS:  Current Outpatient Medications   Medication Sig Dispense Refill    blood sugar diagnostic (ACCU-CHEK MARI PLUS TEST STRP) Strp 1 strip by Misc.(Non-Drug; Combo Route) route 3 (three) times daily before meals. 100 each 6    etonogestreL (NEXPLANON) 68 mg Impl subdermal device by Subdermal route.      metformin (GLUCOPHAGE) 500 MG tablet Take 1 tablet (500 mg total) by mouth 2 (two) times daily. 180 tablet 1    promethazine (PHENERGAN) 25 MG tablet Take 1 tablet (25 mg total) by mouth every 6 (six) hours as needed for Nausea. 20 tablet 1    amLODIPine (NORVASC) 10 MG tablet Take 1 tablet (10 mg total) by mouth once daily. 30 tablet 11    carvediloL (COREG) 25 MG tablet Take 1 tablet (25 mg total) by mouth 2 (two) times daily. 60 tablet 11    dulaglutide (TRULICITY) 0.75 mg/0.5 mL pen injector Inject 0.75 mg into the skin every 7 days.      hydrALAZINE (APRESOLINE) 100 MG tablet Take 1 tablet (100 mg total) by mouth every 8 (eight) hours. 90 tablet 11    isosorbide dinitrate (ISORDIL) 20 MG tablet Take 1 tablet (20 mg total) by mouth 3 (three) times daily. 90 tablet 11    metoclopramide HCl (REGLAN) 10 MG tablet Take 1 tablet (10 mg total) by mouth every 6 (six) hours. (Patient not taking: Reported on 10/10/2022) 30 tablet 0     No current facility-administered medications for this visit.       ALLERGIES:  Review of  "patient's allergies indicates:   Allergen Reactions    Zofran [ondansetron hcl (pf)] Other (See Comments)     Chest burns       FAMILY HISTORY:  History reviewed. No pertinent family history.    SOCIAL HISTORY:  Social History     Tobacco Use    Smoking status: Never    Smokeless tobacco: Never   Substance Use Topics    Alcohol use: Yes    Drug use: No       Review of Systems:  Gen: no fever, no chills, no generalized feeling of weakness   HEENT: no double vision, no blurred vision, no eye pain, no eye exudates. no nasal congestion,   no traumatic injury of head, no neck pain, no neck stiffness. no photophobia or phonophobia at this time. ?    Heart: no chest pain, no SOB    Lungs: no SOB, no cough    MSK: no weakness of legs, intact ROM    ABD: no abd pain, no N/V/D/C, no difficulty with defecation.    Extremities: No leg pain, no edema.       Objective:     Vitals:    10/10/22 0951   BP: (!) 152/98   Pulse: 95   Weight: (!) 162 kg (357 lb 2.3 oz)   Height: 5' 11" (1.803 m)     General: female in NAD, alert and awake, Aox3, well groomed. ?    ? ?    HEENT: Head is NC/AT EOMI, pupil size: 4 mm B/L, no nystagmus noted; hearing grossly intact b/l. Mucous membrane moist, uvula midline, no pharyngeal erythema, exudates or discharges.     Fundi normal bilaterally without evidence of papilledema     Neck: Supple. no nuchal rigidity.      Cardiovascular: well perfused, no cyanosis        Respiratory: Symmetric chest rise noted       Neurological Examination.    Mental status: AA&O x3; Affect/mood is euthymic/congruent; no aphasia noted during examination. Patient answers simple questions appropriately & follows simple commands; no dysarthria or expressive aphasia; no girma-neglect or extinction. Vocabulary/word finding: excellent.       Cranial Nerves: II-XII grossly intact.      Muscle Function:  Full and symmetric use of bilateral upper and lower extremities against gravity     Sensory: ?Intact to light touch   " "  Reflexes:  2/4 throughout     Gait: adequate casual gait with stride length and arm swing WNL.  Stable without the use of cane or walker    Other:  10/03/2016 MRI brain:   FINDINGS:  Brain parenchyma is normal in contour and signal without abnormal enhancement.  No evidence for acute infarct, intracranial hemorrhage, mass effect, or midline shift.  Structures within the sella, suprasellar region, and posterior fossa demonstrate no focal abnormality.  Orbits and globes are unremarkable.  Ventricles are normal in size without evidence for hydrocephalus.  Conventional postcontrast images and MRV images demonstrate normal filling of the deep cerebral venous structures, superior sagittal sinus, straight sinus, torcular, and proximal transverse sinuses.  There is question of nonfilling of the distal transverse sinuses bilaterally with normal flow within the sigmoid sinuses and proximal jugular veins bilaterally.  Postcontrast 3D FSPGR imaging was performed to better delineate the venous structures; however, delay in obtaining this sequence results in overall poor vascular enhancement.  There is subtle non-enhancement of the right transverse sinus distally which is felt to represent artifact from poor opacification.  The left transverse sinus which was previously questioned on the MRV is patent.  No other areas of questionable stenosis are visualized.    There is a small mucous retention cyst within the inferior left maxillary sinus.  IMPRESSION:   No obvious stenosis or filling defect of the venous sinuses, noting an apparent region of poor opacification within the distal right transverse sinus is felt to be artifactual.  Minimal sinus disease."    Assessment:   Riri Gonsalves is a 32 y.o. female presenting to [facility] care for history of seizure disorder and history of idiopathic intracranial hypertension.  Patient remains seizure-free for greater than 5 years with no medication.  No interventions indicated at this " time.  Likewise, the patient displays no evidence of papilledema on exam and has headaches typical of tension-type etiology without recent changes.  No interventions indicated at this time.  Patient was counseled extensively to follow-up with our clinic if she experiences any changes related to these conditions or develops new or concerning neurological symptoms.    Plan:     Problem List Items Addressed This Visit          Neuro    Seizure disorder - Primary    Overview     Old Hx of seizure          Pseudotumor cerebri     - endorse healthy diet and exercise and gradual reduction of BMI  - tight control blood pressure, glucose, cholesterol for good neurological and cardiovascular health  - Follow-up with our clinic in 1 year or sooner as needed    I spent a total of 35 minutes on the day of the visit. This includes face to face time and non-face to face time preparing to see the patient (eg, review of tests), obtaining and/or reviewing separately obtained history, documenting clinical information in the electronic or other health record, independently interpreting results and communicating results to the patient/family/caregiver, or care coordinator.    A dictation device was used to produce this document. Use of such devices sometimes results in grammatical errors or replacement of words that sound similarly.    Gokul Posadas, DO

## 2022-11-21 ENCOUNTER — TELEPHONE (OUTPATIENT)
Dept: OBSTETRICS AND GYNECOLOGY | Facility: CLINIC | Age: 32
End: 2022-11-21
Payer: COMMERCIAL

## 2022-11-21 NOTE — TELEPHONE ENCOUNTER
----- Message from Merna Collado sent at 11/21/2022  9:34 AM CST -----  Needs advice from nurse:      Who Called:pt  Regarding:needs to be seen today for pap and STD check  Would the patient rather a call back or VIA [facility]?  Best Call Back number:[phone]  Additional Info:

## 2022-12-05 ENCOUNTER — TELEPHONE (OUTPATIENT)
Dept: OBSTETRICS AND GYNECOLOGY | Facility: CLINIC | Age: 32
End: 2022-12-05
Payer: COMMERCIAL

## 2022-12-05 NOTE — TELEPHONE ENCOUNTER
----- Message from Kati Herron sent at 12/5/2022  9:12 AM CST -----  Type:  Patient Returning Call    Who Called:pt  Who Left Message for Patient:office  Does the patient know what this is regarding?:scheduling  Would the patient rather a call back or a response via [facility]? call  Best Call Back Number:[phone]  Additional Information:

## 2022-12-06 ENCOUNTER — TELEPHONE (OUTPATIENT)
Dept: OBSTETRICS AND GYNECOLOGY | Facility: CLINIC | Age: 32
End: 2022-12-06
Payer: COMMERCIAL

## 2022-12-06 NOTE — TELEPHONE ENCOUNTER
----- Message from Jaylen Brizuela sent at 12/6/2022  9:42 AM CST -----  Contact: pt  Type:  Patient Returning Call    Who Called:pt  Who Left Message for Patient:Jose Angel Vasquez MA  Does the patient know what this is regarding?: yes  Would the patient rather a call back or a response via [facility]? call  Best Call Back Number:[phone]  Additional Information: would like to schedule an annual. Pt works at night would like a call as soon as possible in the mornings around 9am

## 2022-12-07 NOTE — TELEPHONE ENCOUNTER
Returned call to the patient. Patient agreed to a well woman exam with JOSTIN Campbell on 12/12 at 8:20am at [facility].

## 2022-12-12 ENCOUNTER — OFFICE VISIT (OUTPATIENT)
Dept: OBSTETRICS AND GYNECOLOGY | Facility: CLINIC | Age: 32
End: 2022-12-12
Payer: COMMERCIAL

## 2022-12-12 VITALS — WEIGHT: 293 LBS | DIASTOLIC BLOOD PRESSURE: 78 MMHG | BODY MASS INDEX: 50.12 KG/M2 | SYSTOLIC BLOOD PRESSURE: 114 MMHG

## 2022-12-12 DIAGNOSIS — Z11.3 SCREENING EXAMINATION FOR SEXUALLY TRANSMITTED DISEASE: ICD-10-CM

## 2022-12-12 DIAGNOSIS — N76.0 ACUTE VAGINITIS: ICD-10-CM

## 2022-12-12 DIAGNOSIS — Z01.419 WELL WOMAN EXAM: Primary | ICD-10-CM

## 2022-12-12 DIAGNOSIS — Z12.4 ENCOUNTER FOR PAPANICOLAOU SMEAR FOR CERVICAL CANCER SCREENING: ICD-10-CM

## 2022-12-12 LAB
C TRACH DNA SPEC QL NAA+PROBE: NOT DETECTED
N GONORRHOEA DNA SPEC QL NAA+PROBE: NOT DETECTED

## 2022-12-12 PROCEDURE — 99385 PREV VISIT NEW AGE 18-39: CPT | Mod: S$GLB,,, | Performed by: NURSE PRACTITIONER

## 2022-12-12 PROCEDURE — 1160F PR REVIEW ALL MEDS BY PRESCRIBER/CLIN PHARMACIST DOCUMENTED: ICD-10-PCS | Mod: CPTII,S$GLB,, | Performed by: NURSE PRACTITIONER

## 2022-12-12 PROCEDURE — 1159F PR MEDICATION LIST DOCUMENTED IN MEDICAL RECORD: ICD-10-PCS | Mod: CPTII,S$GLB,, | Performed by: NURSE PRACTITIONER

## 2022-12-12 PROCEDURE — 99999 PR PBB SHADOW E&M-EST. PATIENT-LVL III: ICD-10-PCS | Mod: PBBFAC,,, | Performed by: NURSE PRACTITIONER

## 2022-12-12 PROCEDURE — 3074F SYST BP LT 130 MM HG: CPT | Mod: CPTII,S$GLB,, | Performed by: NURSE PRACTITIONER

## 2022-12-12 PROCEDURE — 1160F RVW MEDS BY RX/DR IN RCRD: CPT | Mod: CPTII,S$GLB,, | Performed by: NURSE PRACTITIONER

## 2022-12-12 PROCEDURE — 3008F PR BODY MASS INDEX (BMI) DOCUMENTED: ICD-10-PCS | Mod: CPTII,S$GLB,, | Performed by: NURSE PRACTITIONER

## 2022-12-12 PROCEDURE — 3078F PR MOST RECENT DIASTOLIC BLOOD PRESSURE < 80 MM HG: ICD-10-PCS | Mod: CPTII,S$GLB,, | Performed by: NURSE PRACTITIONER

## 2022-12-12 PROCEDURE — 87591 N.GONORRHOEAE DNA AMP PROB: CPT | Performed by: NURSE PRACTITIONER

## 2022-12-12 PROCEDURE — 3078F DIAST BP <80 MM HG: CPT | Mod: CPTII,S$GLB,, | Performed by: NURSE PRACTITIONER

## 2022-12-12 PROCEDURE — 3074F PR MOST RECENT SYSTOLIC BLOOD PRESSURE < 130 MM HG: ICD-10-PCS | Mod: CPTII,S$GLB,, | Performed by: NURSE PRACTITIONER

## 2022-12-12 PROCEDURE — 99385 PR PREVENTIVE VISIT,NEW,18-39: ICD-10-PCS | Mod: S$GLB,,, | Performed by: NURSE PRACTITIONER

## 2022-12-12 PROCEDURE — 1159F MED LIST DOCD IN RCRD: CPT | Mod: CPTII,S$GLB,, | Performed by: NURSE PRACTITIONER

## 2022-12-12 PROCEDURE — 87624 HPV HI-RISK TYP POOLED RSLT: CPT | Performed by: NURSE PRACTITIONER

## 2022-12-12 PROCEDURE — 88175 CYTOPATH C/V AUTO FLUID REDO: CPT | Performed by: NURSE PRACTITIONER

## 2022-12-12 PROCEDURE — 99999 PR PBB SHADOW E&M-EST. PATIENT-LVL III: CPT | Mod: PBBFAC,,, | Performed by: NURSE PRACTITIONER

## 2022-12-12 PROCEDURE — 87491 CHLMYD TRACH DNA AMP PROBE: CPT | Performed by: NURSE PRACTITIONER

## 2022-12-12 PROCEDURE — 3008F BODY MASS INDEX DOCD: CPT | Mod: CPTII,S$GLB,, | Performed by: NURSE PRACTITIONER

## 2022-12-12 PROCEDURE — 81514 NFCT DS BV&VAGINITIS DNA ALG: CPT | Performed by: NURSE PRACTITIONER

## 2022-12-12 NOTE — PROGRESS NOTES
CC: Annual    HPI: Pt is a 32 y.o. female who presents for routine annual exam.     States she thinks she has BV, took an old flagyl with some improvement, reports grey vaginal discharge.     PAP: = normal per patient    Menstrual cycle: monthly, duration= 8 days, heavy for 4-5 days, denies severe pain  Contraception: Nexplanon   STD screening- would like vaginal and serology testing.   Exercise: gym     Smoking history: no  Wears seatbelts    Denies domestic violence     Past Medical History:   Diagnosis Date    Diabetes mellitus     Hypertension     IIH (idiopathic intracranial hypertension)     Seizure     remote Hx of seizure disorder        Past Surgical History:   Procedure Laterality Date    URETHRA SURGERY         OB History    Para Term  AB Living   0 0 0 0 0 0   SAB IAB Ectopic Multiple Live Births   0 0 0 0 0       Family History   Problem Relation Age of Onset    Breast cancer Neg Hx     Colon cancer Neg Hx     Ovarian cancer Neg Hx        Social History     Socioeconomic History    Marital status: Single   Tobacco Use    Smoking status: Never    Smokeless tobacco: Never   Substance and Sexual Activity    Alcohol use: Yes     Comment: occasionally    Drug use: Never    Sexual activity: Yes     Partners: Male     Birth control/protection: Implant   Social History Narrative    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 Denies fever or chills.   SKIN: Denies rash or lesions.   HEAD: Denies head injury or headache.   NODES: Denies enlarged lymph nodes.   CHEST: Denies chest pain or shortness of breath.   CARDIOVASCULAR: Denies palpitations or left sided chest pain.   ABDOMEN: No abdominal pain, constipation, diarrhea, nausea, vomiting or rectal bleeding.   URINARY: No dysuria, hematuria, or burning on urination.  REPRODUCTIVE: See HPI.   BREASTS: Denies  pain, lumps, or nipple discharge.   HEMATOLOGIC: No easy bruisability or excessive bleeding.   MUSCULOSKELETAL: Denies joint pain or swelling.   NEUROLOGIC: Denies syncope or weakness.   PSYCHIATRIC: Denies depression, anxiety or mood swings.    PE:   APPEARANCE: Well nourished, well developed, in no acute distress.  AFFECT: WNL, alert and oriented x 3  SKIN: No acne or hirsutism  NECK: Neck symmetric  NODES: No inguinal, cervical, axillary, or femoral lymph node enlargement  CHEST: Good respiratory effect  ABDOMEN: Soft.  No tenderness or masses. Nondistended.  BREASTS: Symmetrical, no skin changes or visible lesions.  No palpable masses, nipple discharge bilaterally.  PELVIC: Normal external genitalia without lesions.  Normal hair distribution.  Adequate perineal body, normal urethral meatus.  Vagina moist and well rugated without lesions or discharge.  Cervix pink, without lesions, discharge or tenderness.  No significant cystocele or rectocele.  Bimanual exam limited due to body habitus shows uterus to be normal size, regular, mobile and nontender.  Adnexa without masses or tenderness.    Anus Perineum:No lesions, no relaxation, no external hemorrhoids.  EXTREMITIES: No edema.      ASSESSMENT AND PLAN  1. Well woman exam        2. Encounter for Papanicolaou smear for cervical cancer screening  HPV High Risk Genotypes, PCR    Liquid-Based Pap Smear, Screening      3. Screening examination for sexually transmitted disease  C. trachomatis/N. gonorrhoeae by AMP DNA    HIV 1/2 Ag/Ab (4th Gen)    RPR    Hepatitis Panel, Acute      4. Acute vaginitis  Vaginosis Screen by DNA Probe          Patient was counseled today on A.C.S. Pap guidelines and recommendations for yearly pelvic exams, mammograms and monthly self breast exams; to see her PCP for other health maintenance.     All questions answered, patient verbalizes understanding.     Follow-up with in 1 year for routine exam and PRN.

## 2022-12-13 LAB
BACTERIAL VAGINOSIS DNA: NEGATIVE
CANDIDA GLABRATA DNA: NEGATIVE
CANDIDA KRUSEI DNA: NEGATIVE
CANDIDA RRNA VAG QL PROBE: NEGATIVE
T VAGINALIS RRNA GENITAL QL PROBE: NEGATIVE

## 2023-01-05 NOTE — ASSESSMENT & PLAN NOTE
Pt has A1c 12.4 (3/17/20) & home med is metformin 500mg BID.     Plan:  Required 174U total insulin  - On insulin gtt at 4U/hr  - accuchecks Q4h    - Detemir 30U BID, will transition off gtt once patient's BGL<200. Nursing to call team when BGLs drop below 200 to d/c gtt  - [facility]  - Watch K for shifts   Cibinqo Counseling: I discussed with the patient the risks of Cibinqo therapy including but not limited to common cold, nausea, headache, cold sores, increased blood CPK levels, dizziness, UTIs, fatigue, acne, and vomitting. Live vaccines should be avoided.  This medication has been linked to serious infections; higher rate of mortality; malignancy and lymphoproliferative disorders; major adverse cardiovascular events; thrombosis; thrombocytopenia and lymphopenia; lipid elevations; and retinal detachment.

## 2023-04-13 ENCOUNTER — OFFICE VISIT (OUTPATIENT)
Dept: OBSTETRICS AND GYNECOLOGY | Facility: CLINIC | Age: 33
End: 2023-04-13
Payer: MEDICAID

## 2023-04-13 VITALS — BODY MASS INDEX: 41.02 KG/M2 | HEIGHT: 71 IN | WEIGHT: 293 LBS

## 2023-04-13 DIAGNOSIS — Z30.433 ENCOUNTER FOR REMOVAL AND REINSERTION OF INTRAUTERINE CONTRACEPTIVE DEVICE (IUD): ICD-10-CM

## 2023-04-13 DIAGNOSIS — Z11.3 SCREEN FOR STD (SEXUALLY TRANSMITTED DISEASE): Primary | ICD-10-CM

## 2023-04-13 PROBLEM — F41.0 PANIC ATTACKS: Status: ACTIVE | Noted: 2023-01-19

## 2023-04-13 PROBLEM — F43.10 PTSD (POST-TRAUMATIC STRESS DISORDER): Status: ACTIVE | Noted: 2023-01-19

## 2023-04-13 PROCEDURE — 1160F RVW MEDS BY RX/DR IN RCRD: CPT | Mod: CPTII,,, | Performed by: OBSTETRICS & GYNECOLOGY

## 2023-04-13 PROCEDURE — 99999 PR PBB SHADOW E&M-EST. PATIENT-LVL III: ICD-10-PCS | Mod: PBBFAC,,, | Performed by: OBSTETRICS & GYNECOLOGY

## 2023-04-13 PROCEDURE — 99214 PR OFFICE/OUTPT VISIT, EST, LEVL IV, 30-39 MIN: ICD-10-PCS | Mod: S$PBB,,, | Performed by: OBSTETRICS & GYNECOLOGY

## 2023-04-13 PROCEDURE — 1159F PR MEDICATION LIST DOCUMENTED IN MEDICAL RECORD: ICD-10-PCS | Mod: CPTII,,, | Performed by: OBSTETRICS & GYNECOLOGY

## 2023-04-13 PROCEDURE — 3008F PR BODY MASS INDEX (BMI) DOCUMENTED: ICD-10-PCS | Mod: CPTII,,, | Performed by: OBSTETRICS & GYNECOLOGY

## 2023-04-13 PROCEDURE — 1160F PR REVIEW ALL MEDS BY PRESCRIBER/CLIN PHARMACIST DOCUMENTED: ICD-10-PCS | Mod: CPTII,,, | Performed by: OBSTETRICS & GYNECOLOGY

## 2023-04-13 PROCEDURE — 81514 NFCT DS BV&VAGINITIS DNA ALG: CPT | Performed by: OBSTETRICS & GYNECOLOGY

## 2023-04-13 PROCEDURE — 87591 N.GONORRHOEAE DNA AMP PROB: CPT | Performed by: OBSTETRICS & GYNECOLOGY

## 2023-04-13 PROCEDURE — 1159F MED LIST DOCD IN RCRD: CPT | Mod: CPTII,,, | Performed by: OBSTETRICS & GYNECOLOGY

## 2023-04-13 PROCEDURE — 99213 OFFICE O/P EST LOW 20 MIN: CPT | Mod: PBBFAC,PN | Performed by: OBSTETRICS & GYNECOLOGY

## 2023-04-13 PROCEDURE — 99214 OFFICE O/P EST MOD 30 MIN: CPT | Mod: S$PBB,,, | Performed by: OBSTETRICS & GYNECOLOGY

## 2023-04-13 PROCEDURE — 3008F BODY MASS INDEX DOCD: CPT | Mod: CPTII,,, | Performed by: OBSTETRICS & GYNECOLOGY

## 2023-04-13 PROCEDURE — 99999 PR PBB SHADOW E&M-EST. PATIENT-LVL III: CPT | Mod: PBBFAC,,, | Performed by: OBSTETRICS & GYNECOLOGY

## 2023-04-13 RX ORDER — BUPROPION HYDROCHLORIDE 300 MG/1
300 TABLET ORAL EVERY MORNING
COMMUNITY
Start: 2023-01-19

## 2023-04-13 RX ORDER — ZOLPIDEM TARTRATE 5 MG/1
5 TABLET ORAL NIGHTLY PRN
COMMUNITY
Start: 2023-01-19

## 2023-04-13 RX ORDER — METRONIDAZOLE 65 MG/5G
1 GEL TOPICAL ONCE
COMMUNITY
Start: 2022-12-14

## 2023-04-13 RX ORDER — TIRZEPATIDE 15 MG/.5ML
15 INJECTION, SOLUTION SUBCUTANEOUS WEEKLY
COMMUNITY
Start: 2023-03-23

## 2023-04-13 RX ORDER — FUROSEMIDE 40 MG/1
TABLET ORAL
COMMUNITY

## 2023-04-13 RX ORDER — BUDESONIDE AND FORMOTEROL FUMARATE DIHYDRATE 80; 4.5 UG/1; UG/1
AEROSOL RESPIRATORY (INHALATION)
COMMUNITY

## 2023-04-13 RX ORDER — METRONIDAZOLE 500 MG/1
500 TABLET ORAL 2 TIMES DAILY
COMMUNITY
Start: 2022-12-07

## 2023-04-13 RX ORDER — FLUCONAZOLE 150 MG/1
TABLET ORAL
COMMUNITY
End: 2024-01-26

## 2023-04-13 RX ORDER — LORAZEPAM 0.5 MG/1
0.5 TABLET ORAL DAILY PRN
COMMUNITY
Start: 2023-01-19

## 2023-04-13 RX ORDER — ALBUTEROL SULFATE 90 UG/1
2 AEROSOL, METERED RESPIRATORY (INHALATION) EVERY 4 HOURS PRN
COMMUNITY
Start: 2022-12-30

## 2023-04-13 RX ORDER — INSULIN GLARGINE 300 U/ML
INJECTION, SOLUTION SUBCUTANEOUS
COMMUNITY
Start: 2023-03-20

## 2023-04-13 RX ORDER — IBUPROFEN 800 MG/1
TABLET ORAL
COMMUNITY

## 2023-04-13 RX ORDER — ATORVASTATIN CALCIUM 20 MG/1
TABLET, FILM COATED ORAL
COMMUNITY

## 2023-04-13 RX ORDER — HYDROXYZINE HYDROCHLORIDE 50 MG/1
TABLET, FILM COATED ORAL
COMMUNITY

## 2023-04-13 RX ORDER — CYCLOBENZAPRINE HCL 10 MG
10 TABLET ORAL 2 TIMES DAILY PRN
COMMUNITY
Start: 2022-10-02

## 2023-04-13 NOTE — PROGRESS NOTES
HISTORY OF PRESENT ILLNESS:    Riri Gonsalves is a 32 y.o. female, , Patient's last menstrual period was 2023 (exact date).,  presents to establish care. Had WWE 2022  Cycles began at age 13, always been monthly. For the last 10 years occurred every 28 days lasting 8 days using 2-3 pads & 4-5 tampons per day.   Nexplanon placed 10/2018 - cycles initially were lighter, but returned to heavy in .   Condom use now   She does desire STD screening.      Past Medical History:   Diagnosis Date    Diabetes mellitus     Hypertension     IIH (idiopathic intracranial hypertension)     Seizure     remote Hx of seizure disorder        Past Surgical History:   Procedure Laterality Date    URETHRA SURGERY         MEDICATIONS AND ALLERGIES:      Current Outpatient Medications:     blood sugar diagnostic (ACCU-CHEK MARI PLUS TEST STRP) Strp, 1 strip by Misc.(Non-Drug; Combo Route) route 3 (three) times daily before meals., Disp: 100 each, Rfl: 6    cyclobenzaprine (FLEXERIL) 10 MG tablet, Take 10 mg by mouth 2 (two) times daily as needed., Disp: , Rfl:     empagliflozin (JARDIANCE) 25 mg tablet, Take 25 mg by mouth., Disp: , Rfl:     etonogestreL (NEXPLANON) 68 mg Impl subdermal device, by Subdermal route., Disp: , Rfl:     metformin (GLUCOPHAGE) 500 MG tablet, Take 1 tablet (500 mg total) by mouth 2 (two) times daily., Disp: 180 tablet, Rfl: 1    metoclopramide HCl (REGLAN) 10 MG tablet, Take 1 tablet (10 mg total) by mouth every 6 (six) hours., Disp: 30 tablet, Rfl: 0    promethazine (PHENERGAN) 25 MG tablet, Take 1 tablet (25 mg total) by mouth every 6 (six) hours as needed for Nausea., Disp: 20 tablet, Rfl: 1    albuterol (PROVENTIL/VENTOLIN HFA) 90 mcg/actuation inhaler, 2 puffs every 4 (four) hours as needed., Disp: , Rfl:     atorvastatin (LIPITOR) 20 MG tablet, atorvastatin 20 mg tablet  TAKE 1 TABLET BY MOUTH ONCE DAILY FOR CHOLESTEROL, Disp: , Rfl:     budesonide-formoterol 80-4.5 mcg (SYMBICORT)  80-4.5 mcg/actuation HFAA, Symbicort 80 mcg-4.5 mcg/actuation HFA aerosol inhaler, Disp: , Rfl:     buPROPion (WELLBUTRIN XL) 300 MG 24 hr tablet, Take 300 mg by mouth every morning., Disp: , Rfl:     fluconazole (DIFLUCAN) 150 MG Tab, fluconazole 150 mg tablet  TAKE 1 TABLET BY MOUTH EVERY 4 DAYS AS DIRECTED, Disp: , Rfl:     furosemide (LASIX) 40 MG tablet, furosemide 40 mg tablet  TAKE 1 TABLET BY MOUTH ONCE DAILY, Disp: , Rfl:     hydrOXYzine (ATARAX) 50 MG tablet, hydroxyzine HCl 50 mg tablet  TAKE 1 TABLET BY MOUTH EVERY 12 HOURS AS NEEDED FOR 30 DAYS, Disp: , Rfl:     ibuprofen (ADVIL,MOTRIN) 800 MG tablet, ibuprofen 800 mg tablet  TAKE 1 TABLET BY MOUTH EVERY 6 TO 8 HOURS AS NEEDED FOR PAIN RELIEF, Disp: , Rfl:     LORazepam (ATIVAN) 0.5 MG tablet, Take 0.5 mg by mouth daily as needed., Disp: , Rfl:     metroNIDAZOLE (FLAGYL) 500 MG tablet, Take 500 mg by mouth 2 (two) times daily., Disp: , Rfl:     MOUNJARO 15 mg/0.5 mL PnIj, Inject 15 mg into the skin once a week., Disp: , Rfl:     NUVESSA 1.3 % (65 mg/5 gram) Gel, Place 1 applicator vaginally once., Disp: , Rfl:     TOUJEO MAX U-300 SOLOSTAR 300 unit/mL (3 mL) insulin pen, SMARTSI Unit(s) SUB-Q Daily, Disp: , Rfl:     zolpidem (AMBIEN) 5 MG Tab, Take 5 mg by mouth nightly as needed., Disp: , Rfl:     Review of patient's allergies indicates:   Allergen Reactions    Zofran [ondansetron hcl (pf)] Other (See Comments)     Chest burns       Family History   Problem Relation Age of Onset    Breast cancer Neg Hx     Colon cancer Neg Hx     Ovarian cancer Neg Hx        Social History     Socioeconomic History    Marital status: Single   Tobacco Use    Smoking status: Never    Smokeless tobacco: Never   Substance and Sexual Activity    Alcohol use: Yes     Comment: occasionally    Drug use: Never    Sexual activity: Yes     Partners: Male     Birth control/protection: Implant   Social History Narrative    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  Infection History: Denies STDs. Denies PID.  Benign History: Denies uterine fibroids. Denies ovarian cysts. Denies endometriosis. Denies other conditions.  Cancer History: Denies cervical cancer. Denies uterine cancer or hyperplasia. Denies ovarian cancer. Denies vulvar cancer or pre-cancer. Denies vaginal cancer or pre-cancer. Denies breast cancer. Denies colon cancer.  Sexual Activity History: Reports currently being sexually active  Menstrual History: Monthly. Mod then light flow.   Dysmenorrhea History: Reports mild dysmenorrhea.       ROS:  GENERAL: No weight changes. No swelling. No fatigue. No fever.  CARDIOVASCULAR: No chest pain. No shortness of breath. No leg cramps.   NEUROLOGICAL: No headaches. No vision changes.  BREASTS: No pain. No lumps. No discharge.  ABDOMEN: No pain. No nausea. No vomiting. No diarrhea. No constipation.  REPRODUCTIVE: No abnormal bleeding.   VULVA: No pain. No lesions. No itching.  VAGINA: No relaxation. No itching. No odor. No discharge. No lesions.  URINARY: No incontinence. No nocturia. No frequency. No dysuria.    Ht 5' 11" (1.803 m)   Wt (!) 161 kg (354 lb 15.1 oz)   LMP 03/24/2023 (Exact Date)   BMI 49.50 kg/m²     PE:  APPEARANCE: Well nourished, well developed, in no acute distress.  AFFECT: WNL, alert and oriented x 3.  SKIN: No acne or hirsutism.  ABDOMEN: Soft. No tenderness or masses. No hepatosplenomegaly. No hernias.  PELVIC: External female genitalia without lesions.  Female hair distribution. Adequate perineal body, Normal urethral meatus. Vagina moist and well rugated without lesions or discharge.  No significant cystocele or rectocele present. Cervix pink without lesions, discharge or tenderness. Uterus is 4-6 week size, regular, mobile and nontender. Adnexa without masses or tenderness.  EXTREMITIES: No edema    DIAGNOSIS:  1. Screen for STD (sexually transmitted disease)    2. Encounter " for removal and reinsertion of intrauterine contraceptive device (IUD)        PLAN:    Orders Placed This Encounter    Vaginosis Screen by DNA Probe    C. trachomatis/N. gonorrhoeae by AMP DNA    HIV 1/2 Ag/Ab (4th Gen)    Hepatitis Panel, Acute    RPR    Device Authorization Order       COUNSELING:  The patient was counseled today on:  -A.C.S. Pap and pelvic exam guidelines (pap every 3 years), recomendations for yearly mammogram;  -to follow up with her PCP for other health maintenance.    FOLLOW-UP with for Nexplanon removal & reinsertion   December for STEVE

## 2023-04-14 LAB
BACTERIAL VAGINOSIS DNA: POSITIVE
CANDIDA GLABRATA DNA: NEGATIVE
CANDIDA KRUSEI DNA: NEGATIVE
CANDIDA RRNA VAG QL PROBE: POSITIVE
T VAGINALIS RRNA GENITAL QL PROBE: NEGATIVE

## 2023-04-15 LAB
C TRACH DNA SPEC QL NAA+PROBE: NOT DETECTED
N GONORRHOEA DNA SPEC QL NAA+PROBE: NOT DETECTED

## 2023-04-17 DIAGNOSIS — N76.0 ACUTE VAGINITIS: Primary | ICD-10-CM

## 2023-04-17 RX ORDER — FLUCONAZOLE 150 MG/1
150 TABLET ORAL ONCE
Qty: 1 TABLET | Refills: 0 | Status: SHIPPED | OUTPATIENT
Start: 2023-04-17 | End: 2023-04-17

## 2023-04-17 RX ORDER — METRONIDAZOLE 7.5 MG/G
1 GEL VAGINAL DAILY
Qty: 1 G | Refills: 0 | Status: SHIPPED | OUTPATIENT
Start: 2023-04-17 | End: 2023-04-24

## 2023-04-17 NOTE — PROGRESS NOTES
Patient has Bacterial vaginosis and yeast   Plan:  Metrogel Disp one tube, one applicatorful qhs for 5 nights sent to the pharmacy.  Terazol 7 sent to the pharmacy.    Please  patient on vaginitis prevention including :  a. avoiding feminine products such as deoderant soaps, body wash, bubble bath, douches, scented toilet paper, deoderant tampons or pads, feminine wipes, chronic pad use, etc.  b. avoiding other vulvovaginal irritants such as long hot baths, humidity, tight, synthetic clothing, chlorine and sitting around in wet bathing suits  c. wearing cotton underwear, avoiding thong underwear and no underwear to bed  d. taking showers instead of baths and use a hair dryer on cool setting afterwards to dry  e. wearing cotton to exercise and shower immediately after exercise and change clothes  f. using polyurethane condoms without spermicide if sexually active and symptoms are triggered by intercourse

## 2024-01-26 RX ORDER — FLUCONAZOLE 150 MG/1
TABLET ORAL
Qty: 1 TABLET | Refills: 0 | Status: SHIPPED | OUTPATIENT
Start: 2024-01-26